# Patient Record
Sex: FEMALE | Race: WHITE | NOT HISPANIC OR LATINO | Employment: OTHER | URBAN - METROPOLITAN AREA
[De-identification: names, ages, dates, MRNs, and addresses within clinical notes are randomized per-mention and may not be internally consistent; named-entity substitution may affect disease eponyms.]

---

## 2018-03-18 ENCOUNTER — APPOINTMENT (EMERGENCY)
Dept: RADIOLOGY | Facility: HOSPITAL | Age: 73
DRG: 352 | End: 2018-03-18
Payer: MEDICARE

## 2018-03-18 ENCOUNTER — TELEPHONE (OUTPATIENT)
Dept: INTERNAL MEDICINE | Facility: CLINIC | Age: 73
End: 2018-03-18

## 2018-03-18 ENCOUNTER — ANESTHESIA EVENT (INPATIENT)
Dept: OPERATING ROOM | Facility: HOSPITAL | Age: 73
DRG: 352 | End: 2018-03-18
Payer: MEDICARE

## 2018-03-18 ENCOUNTER — HOSPITAL ENCOUNTER (INPATIENT)
Facility: HOSPITAL | Age: 73
LOS: 6 days | Discharge: HOME | DRG: 352 | End: 2018-03-24
Attending: EMERGENCY MEDICINE | Admitting: SURGERY
Payer: MEDICARE

## 2018-03-18 ENCOUNTER — ANESTHESIA (INPATIENT)
Dept: OPERATING ROOM | Facility: HOSPITAL | Age: 73
DRG: 352 | End: 2018-03-18
Payer: MEDICARE

## 2018-03-18 DIAGNOSIS — K40.30 INCARCERATED INGUINAL HERNIA: ICD-10-CM

## 2018-03-18 DIAGNOSIS — K56.609 SBO (SMALL BOWEL OBSTRUCTION) (CMS/HCC): ICD-10-CM

## 2018-03-18 DIAGNOSIS — K40.30: ICD-10-CM

## 2018-03-18 DIAGNOSIS — K41.30 INCARCERATED FEMORAL HERNIA: Primary | ICD-10-CM

## 2018-03-18 LAB
ABO + RH BLD: NORMAL
ANION GAP SERPL CALC-SCNC: 12 MEQ/L (ref 3–15)
APTT PPP: 24 SEC. (ref 23–35)
BACTERIA URNS QL MICRO: ABNORMAL /UL
BASOPHILS # BLD: 0.03 K/UL (ref 0.01–0.1)
BASOPHILS NFR BLD: 0.3 %
BILIRUB UR QL STRIP.AUTO: NEGATIVE MG/DL
BLD GP AB SCN SERPL QL: NEGATIVE
BUN SERPL-MCNC: 12 MG/DL (ref 8–20)
CALCIUM SERPL-MCNC: 9.5 MG/DL (ref 8.9–10.3)
CHLORIDE SERPL-SCNC: 96 MMOL/L (ref 98–109)
CLARITY UR REFRACT.AUTO: CLEAR
CO2 SERPL-SCNC: 25 MMOL/L (ref 22–32)
COLOR UR AUTO: YELLOW
CREAT SERPL-MCNC: 0.5 MG/DL (ref 0.6–1.1)
D AG BLD QL: NEGATIVE
EOSINOPHIL # BLD: 0.02 K/UL (ref 0.04–0.36)
EOSINOPHIL NFR BLD: 0.2 %
ERYTHROCYTE [DISTWIDTH] IN BLOOD BY AUTOMATED COUNT: 11.4 % (ref 11.7–14.4)
GFR SERPL CREATININE-BSD FRML MDRD: >60 ML/MIN/1.73M*2
GLUCOSE SERPL-MCNC: 140 MG/DL (ref 70–99)
GLUCOSE UR STRIP.AUTO-MCNC: NEGATIVE MG/DL
HCT VFR BLDCO AUTO: 37.9 % (ref 35–45)
HGB BLD-MCNC: 13.9 G/DL (ref 11.8–15.7)
HGB UR QL STRIP.AUTO: NEGATIVE
HYALINE CASTS #/AREA URNS LPF: ABNORMAL /LPF
IMM GRANULOCYTES # BLD AUTO: 0.03 K/UL (ref 0–0.08)
IMM GRANULOCYTES NFR BLD AUTO: 0.3 %
INR PPP: 1 INR
KETONES UR STRIP.AUTO-MCNC: ABNORMAL MG/DL
LEUKOCYTE ESTERASE UR QL STRIP.AUTO: NEGATIVE
LYMPHOCYTES # BLD: 0.83 K/UL (ref 1.2–3.5)
LYMPHOCYTES NFR BLD: 9.4 %
MCH RBC QN AUTO: 33.1 PG (ref 28–33.2)
MCHC RBC AUTO-ENTMCNC: 36.7 G/DL (ref 32.2–35.5)
MCV RBC AUTO: 90.2 FL (ref 83–98)
MONOCYTES # BLD: 0.23 K/UL (ref 0.28–0.8)
MONOCYTES NFR BLD: 2.6 %
NEUTROPHILS # BLD: 7.71 K/UL (ref 1.7–7)
NEUTS SEG NFR BLD: 87.2 %
NITRITE UR QL STRIP.AUTO: NEGATIVE
NRBC BLD-RTO: 0.1 %
PDW BLD AUTO: 10.4 FL (ref 9.4–12.3)
PH UR STRIP.AUTO: 7.5 [PH]
PLATELET # BLD AUTO: 205 K/UL (ref 150–369)
POTASSIUM SERPL-SCNC: 3.7 MMOL/L (ref 3.6–5.1)
PROT UR QL STRIP.AUTO: 1
PROTHROMBIN TIME: 13.3 SEC. (ref 12.2–14.5)
RBC # BLD AUTO: 4.2 M/UL (ref 3.93–5.22)
RBC #/AREA URNS HPF: ABNORMAL /HPF
SODIUM SERPL-SCNC: 133 MMOL/L (ref 136–144)
SP GR UR REFRACT.AUTO: 1.02
SQUAMOUS URNS QL MICRO: 1 /HPF
UROBILINOGEN UR STRIP-ACNC: 0.2 EU/DL
WBC # BLD AUTO: 8.85 K/UL (ref 3.8–10.5)
WBC #/AREA URNS HPF: ABNORMAL /HPF

## 2018-03-18 PROCEDURE — 96375 TX/PRO/DX INJ NEW DRUG ADDON: CPT | Mod: 59

## 2018-03-18 PROCEDURE — 71000011 HC PACU PHASE 1 EA ADDL MIN: Performed by: SURGERY

## 2018-03-18 PROCEDURE — 63600000 HC DRUGS/DETAIL CODE: Performed by: STUDENT IN AN ORGANIZED HEALTH CARE EDUCATION/TRAINING PROGRAM

## 2018-03-18 PROCEDURE — 36415 COLL VENOUS BLD VENIPUNCTURE: CPT | Performed by: PHYSICIAN ASSISTANT

## 2018-03-18 PROCEDURE — 88302 TISSUE EXAM BY PATHOLOGIST: CPT | Mod: TC | Performed by: SURGERY

## 2018-03-18 PROCEDURE — 74177 CT ABD & PELVIS W/CONTRAST: CPT

## 2018-03-18 PROCEDURE — 27200000 HC STERILE SUPPLY: Performed by: SURGERY

## 2018-03-18 PROCEDURE — 25800000 HC PHARMACY IV SOLUTIONS: Performed by: PHYSICIAN ASSISTANT

## 2018-03-18 PROCEDURE — 81001 URINALYSIS AUTO W/SCOPE: CPT | Performed by: PHYSICIAN ASSISTANT

## 2018-03-18 PROCEDURE — 86900 BLOOD TYPING SEROLOGIC ABO: CPT

## 2018-03-18 PROCEDURE — 85610 PROTHROMBIN TIME: CPT | Performed by: PHYSICIAN ASSISTANT

## 2018-03-18 PROCEDURE — 27200121 HC CATH FOLEY

## 2018-03-18 PROCEDURE — 99284 EMERGENCY DEPT VISIT MOD MDM: CPT | Mod: 25

## 2018-03-18 PROCEDURE — 99223 1ST HOSP IP/OBS HIGH 75: CPT | Mod: 57 | Performed by: SURGERY

## 2018-03-18 PROCEDURE — 37000001 HC ANESTHESIA GENERAL: Performed by: SURGERY

## 2018-03-18 PROCEDURE — 80048 BASIC METABOLIC PNL TOTAL CA: CPT | Performed by: PHYSICIAN ASSISTANT

## 2018-03-18 PROCEDURE — 36000003 HC OR LEVEL 3 INITIAL 30MIN: Performed by: SURGERY

## 2018-03-18 PROCEDURE — 36000013 HC OR LEVEL 3 EA ADDL MIN: Performed by: SURGERY

## 2018-03-18 PROCEDURE — 25800000 HC PHARMACY IV SOLUTIONS: Performed by: STUDENT IN AN ORGANIZED HEALTH CARE EDUCATION/TRAINING PROGRAM

## 2018-03-18 PROCEDURE — 63600000 HC DRUGS/DETAIL CODE: Performed by: NURSE ANESTHETIST, CERTIFIED REGISTERED

## 2018-03-18 PROCEDURE — 85730 THROMBOPLASTIN TIME PARTIAL: CPT | Performed by: PHYSICIAN ASSISTANT

## 2018-03-18 PROCEDURE — 0YQ70ZZ REPAIR RIGHT FEMORAL REGION, OPEN APPROACH: ICD-10-PCS | Performed by: SURGERY

## 2018-03-18 PROCEDURE — 12000000 HC ROOM AND CARE MED/SURG

## 2018-03-18 PROCEDURE — 96361 HYDRATE IV INFUSION ADD-ON: CPT | Mod: 59

## 2018-03-18 PROCEDURE — 25000000 HC PHARMACY GENERAL: Performed by: SURGERY

## 2018-03-18 PROCEDURE — 63600000 HC DRUGS/DETAIL CODE: Performed by: PHYSICIAN ASSISTANT

## 2018-03-18 PROCEDURE — 85025 COMPLETE CBC W/AUTO DIFF WBC: CPT | Performed by: PHYSICIAN ASSISTANT

## 2018-03-18 PROCEDURE — 25000000 HC PHARMACY GENERAL: Performed by: NURSE ANESTHETIST, CERTIFIED REGISTERED

## 2018-03-18 PROCEDURE — 49553 RPR FEM HERNIA INIT BLOCKED: CPT | Mod: RT | Performed by: SURGERY

## 2018-03-18 PROCEDURE — 25800000 HC PHARMACY IV SOLUTIONS: Performed by: NURSE ANESTHETIST, CERTIFIED REGISTERED

## 2018-03-18 PROCEDURE — 71000001 HC PACU PHASE 1 INITIAL 30MIN: Performed by: SURGERY

## 2018-03-18 PROCEDURE — 63600000 HC DRUGS/DETAIL CODE: Mod: JW | Performed by: NURSE ANESTHETIST, CERTIFIED REGISTERED

## 2018-03-18 PROCEDURE — 63600105 HC IODINE BASED CONTRAST: Performed by: PHYSICIAN ASSISTANT

## 2018-03-18 PROCEDURE — 96374 THER/PROPH/DIAG INJ IV PUSH: CPT | Mod: 59

## 2018-03-18 RX ORDER — ESOMEPRAZOLE MAGNESIUM 40 MG/1
40 CAPSULE, DELAYED RELEASE ORAL DAILY
COMMUNITY
Start: 2014-10-30 | End: 2020-01-06 | Stop reason: SDUPTHER

## 2018-03-18 RX ORDER — HEPARIN SODIUM 5000 [USP'U]/ML
5000 INJECTION, SOLUTION INTRAVENOUS; SUBCUTANEOUS EVERY 8 HOURS
Status: DISCONTINUED | OUTPATIENT
Start: 2018-03-19 | End: 2018-03-24 | Stop reason: HOSPADM

## 2018-03-18 RX ORDER — PROPOFOL 10 MG/ML
INJECTION, EMULSION INTRAVENOUS AS NEEDED
Status: DISCONTINUED | OUTPATIENT
Start: 2018-03-18 | End: 2018-03-18 | Stop reason: SURG

## 2018-03-18 RX ORDER — HYDROMORPHONE HYDROCHLORIDE 1 MG/ML
0.5 INJECTION, SOLUTION INTRAMUSCULAR; INTRAVENOUS; SUBCUTANEOUS ONCE
Status: COMPLETED | OUTPATIENT
Start: 2018-03-18 | End: 2018-03-18

## 2018-03-18 RX ORDER — DEXTROSE MONOHYDRATE AND SODIUM CHLORIDE 5; .9 G/100ML; G/100ML
INJECTION, SOLUTION INTRAVENOUS CONTINUOUS
Status: DISCONTINUED | OUTPATIENT
Start: 2018-03-18 | End: 2018-03-19

## 2018-03-18 RX ORDER — LIDOCAINE HYDROCHLORIDE 10 MG/ML
INJECTION, SOLUTION INFILTRATION; PERINEURAL AS NEEDED
Status: DISCONTINUED | OUTPATIENT
Start: 2018-03-18 | End: 2018-03-18 | Stop reason: SURG

## 2018-03-18 RX ORDER — DEXTROSE 50 % IN WATER (D50W) INTRAVENOUS SYRINGE
25 AS NEEDED
Status: DISCONTINUED | OUTPATIENT
Start: 2018-03-18 | End: 2018-03-24 | Stop reason: HOSPADM

## 2018-03-18 RX ORDER — DEXTROSE 40 %
15-30 GEL (GRAM) ORAL AS NEEDED
Status: DISCONTINUED | OUTPATIENT
Start: 2018-03-18 | End: 2018-03-24 | Stop reason: HOSPADM

## 2018-03-18 RX ORDER — PHENYLEPHRINE HYDROCHLORIDE 10 MG/ML
INJECTION INTRAVENOUS AS NEEDED
Status: DISCONTINUED | OUTPATIENT
Start: 2018-03-18 | End: 2018-03-18 | Stop reason: SURG

## 2018-03-18 RX ORDER — SODIUM CHLORIDE 9 MG/ML
INJECTION, SOLUTION INTRAVENOUS CONTINUOUS PRN
Status: DISCONTINUED | OUTPATIENT
Start: 2018-03-18 | End: 2018-03-18 | Stop reason: SURG

## 2018-03-18 RX ORDER — HYDROMORPHONE HYDROCHLORIDE 2 MG/1
2-4 TABLET ORAL EVERY 4 HOURS PRN
Status: DISCONTINUED | OUTPATIENT
Start: 2018-03-18 | End: 2018-03-24 | Stop reason: HOSPADM

## 2018-03-18 RX ORDER — ROCURONIUM BROMIDE 10 MG/ML
INJECTION, SOLUTION INTRAVENOUS AS NEEDED
Status: DISCONTINUED | OUTPATIENT
Start: 2018-03-18 | End: 2018-03-18 | Stop reason: SURG

## 2018-03-18 RX ORDER — HYDROMORPHONE HYDROCHLORIDE 1 MG/ML
.25-.5 INJECTION, SOLUTION INTRAMUSCULAR; INTRAVENOUS; SUBCUTANEOUS
Status: DISCONTINUED | OUTPATIENT
Start: 2018-03-18 | End: 2018-03-24 | Stop reason: HOSPADM

## 2018-03-18 RX ORDER — ATORVASTATIN CALCIUM 20 MG/1
20 TABLET, FILM COATED ORAL DAILY
COMMUNITY
Start: 2018-01-30 | End: 2019-02-23 | Stop reason: SDUPTHER

## 2018-03-18 RX ORDER — LIDOCAINE HYDROCHLORIDE 10 MG/ML
INJECTION, SOLUTION INFILTRATION; PERINEURAL AS NEEDED
Status: DISCONTINUED | OUTPATIENT
Start: 2018-03-18 | End: 2018-03-18 | Stop reason: HOSPADM

## 2018-03-18 RX ORDER — FENTANYL CITRATE 50 UG/ML
INJECTION, SOLUTION INTRAMUSCULAR; INTRAVENOUS AS NEEDED
Status: DISCONTINUED | OUTPATIENT
Start: 2018-03-18 | End: 2018-03-18 | Stop reason: SURG

## 2018-03-18 RX ORDER — CIPROFLOXACIN 500 MG/1
500 TABLET ORAL EVERY 12 HOURS
Status: DISCONTINUED | OUTPATIENT
Start: 2018-03-19 | End: 2018-03-18

## 2018-03-18 RX ORDER — ONDANSETRON HYDROCHLORIDE 2 MG/ML
INJECTION, SOLUTION INTRAVENOUS AS NEEDED
Status: DISCONTINUED | OUTPATIENT
Start: 2018-03-18 | End: 2018-03-18 | Stop reason: SURG

## 2018-03-18 RX ORDER — ONDANSETRON HYDROCHLORIDE 2 MG/ML
4 INJECTION, SOLUTION INTRAVENOUS ONCE
Status: COMPLETED | OUTPATIENT
Start: 2018-03-18 | End: 2018-03-18

## 2018-03-18 RX ORDER — GLYCOPYRROLATE 0.6MG/3ML
SYRINGE (ML) INTRAVENOUS AS NEEDED
Status: DISCONTINUED | OUTPATIENT
Start: 2018-03-18 | End: 2018-03-18 | Stop reason: SURG

## 2018-03-18 RX ORDER — DEXAMETHASONE SODIUM PHOSPHATE 4 MG/ML
INJECTION, SOLUTION INTRA-ARTICULAR; INTRALESIONAL; INTRAMUSCULAR; INTRAVENOUS; SOFT TISSUE AS NEEDED
Status: DISCONTINUED | OUTPATIENT
Start: 2018-03-18 | End: 2018-03-18 | Stop reason: SURG

## 2018-03-18 RX ORDER — ONDANSETRON HYDROCHLORIDE 2 MG/ML
INJECTION, SOLUTION INTRAVENOUS
Status: COMPLETED
Start: 2018-03-18 | End: 2018-03-18

## 2018-03-18 RX ORDER — BUPIVACAINE HYDROCHLORIDE AND EPINEPHRINE 2.5; 5 MG/ML; UG/ML
INJECTION, SOLUTION EPIDURAL; INFILTRATION; INTRACAUDAL; PERINEURAL AS NEEDED
Status: DISCONTINUED | OUTPATIENT
Start: 2018-03-18 | End: 2018-03-18 | Stop reason: HOSPADM

## 2018-03-18 RX ORDER — NEOSTIGMINE METHYLSULFATE 1 MG/ML
INJECTION INTRAVENOUS AS NEEDED
Status: DISCONTINUED | OUTPATIENT
Start: 2018-03-18 | End: 2018-03-18 | Stop reason: SURG

## 2018-03-18 RX ORDER — IBUPROFEN 200 MG
16-32 TABLET ORAL AS NEEDED
Status: DISCONTINUED | OUTPATIENT
Start: 2018-03-18 | End: 2018-03-24 | Stop reason: HOSPADM

## 2018-03-18 RX ORDER — KETOROLAC TROMETHAMINE 30 MG/ML
INJECTION, SOLUTION INTRAMUSCULAR; INTRAVENOUS AS NEEDED
Status: DISCONTINUED | OUTPATIENT
Start: 2018-03-18 | End: 2018-03-18 | Stop reason: SURG

## 2018-03-18 RX ADMIN — SODIUM CHLORIDE 2 MG: 9 INJECTION, SOLUTION INTRAVENOUS at 22:48

## 2018-03-18 RX ADMIN — FENTANYL CITRATE 50 MCG: 50 INJECTION, SOLUTION INTRAMUSCULAR; INTRAVENOUS at 23:21

## 2018-03-18 RX ADMIN — DEXTROSE AND SODIUM CHLORIDE: 5; 900 INJECTION, SOLUTION INTRAVENOUS at 20:57

## 2018-03-18 RX ADMIN — IOHEXOL 135 ML: 300 INJECTION, SOLUTION INTRAVENOUS at 19:23

## 2018-03-18 RX ADMIN — FENTANYL CITRATE 50 MCG: 50 INJECTION, SOLUTION INTRAMUSCULAR; INTRAVENOUS at 22:48

## 2018-03-18 RX ADMIN — Medication 5 MG: at 23:41

## 2018-03-18 RX ADMIN — HYDROMORPHONE HYDROCHLORIDE 0.5 MG: 1 INJECTION, SOLUTION INTRAMUSCULAR; INTRAVENOUS; SUBCUTANEOUS at 17:58

## 2018-03-18 RX ADMIN — SODIUM CHLORIDE 1000 ML: 9 INJECTION, SOLUTION INTRAVENOUS at 17:51

## 2018-03-18 RX ADMIN — LIDOCAINE HYDROCHLORIDE 4 ML: 10 INJECTION, SOLUTION INFILTRATION; PERINEURAL at 22:48

## 2018-03-18 RX ADMIN — Medication 0.4 MG: at 22:48

## 2018-03-18 RX ADMIN — KETOROLAC TROMETHAMINE 30 MG: 30 INJECTION, SOLUTION INTRAMUSCULAR at 23:31

## 2018-03-18 RX ADMIN — PHENYLEPHRINE HYDROCHLORIDE 100 MCG: 10 INJECTION INTRAVENOUS at 23:34

## 2018-03-18 RX ADMIN — ONDANSETRON 4 MG: 2 INJECTION INTRAMUSCULAR; INTRAVENOUS at 22:40

## 2018-03-18 RX ADMIN — ROCURONIUM BROMIDE 40 MG: 10 INJECTION, SOLUTION INTRAVENOUS at 22:48

## 2018-03-18 RX ADMIN — PROPOFOL 140 MG: 10 INJECTION, EMULSION INTRAVENOUS at 22:48

## 2018-03-18 RX ADMIN — FENTANYL CITRATE 50 MCG: 50 INJECTION, SOLUTION INTRAMUSCULAR; INTRAVENOUS at 23:10

## 2018-03-18 RX ADMIN — DEXAMETHASONE SODIUM PHOSPHATE 4 MG: 4 INJECTION, SOLUTION INTRAMUSCULAR; INTRAVENOUS at 23:30

## 2018-03-18 RX ADMIN — SODIUM CHLORIDE: 900 INJECTION, SOLUTION INTRAVENOUS at 22:47

## 2018-03-18 RX ADMIN — ONDANSETRON HYDROCHLORIDE 4 MG: 2 INJECTION, SOLUTION INTRAMUSCULAR; INTRAVENOUS at 17:58

## 2018-03-18 RX ADMIN — Medication 0.8 MG: at 23:41

## 2018-03-18 ASSESSMENT — ENCOUNTER SYMPTOMS
DIARRHEA: 0
NECK PAIN: 0
DIFFICULTY URINATING: 0
NAUSEA: 1
WEAKNESS: 0
ABDOMINAL PAIN: 1
HEADACHES: 0
FEVER: 0
SHORTNESS OF BREATH: 0
BACK PAIN: 0

## 2018-03-18 NOTE — TELEPHONE ENCOUNTER
Spoke with patient  She had sudden onset of abd pain today mostly lower abd and right sided,assosciated with vomiting x2  She also had constipation.some chills and no fever  I advised her to be evaluated in ER  And she agreed to go to [facility]

## 2018-03-18 NOTE — H&P
General Surgery History and Physical    Diagnosis: No admission diagnoses are documented for this encounter..    HPI     Patient is a 72 y.o. female who presents with 1 day h/o aching RLQ pain after mooving furniture this morning. She did not feel a pop but gradually developed abdominal pain and felt a new lump in her right inguinal region. + nausea and emesis x2. Dis have 2 small BM and flatus after she felt the lump but does feel slightly distended. No fevers, never had this before, no hernia history.     Medical History:   Past Medical History:   Diagnosis Date   • Marshall esophagus    • High cholesterol    • Hypothyroidism        Surgical History:   Past Surgical History:   Procedure Laterality Date   • CHOLECYSTECTOMY     • HYSTERECTOMY         Social History:   Social History     Social History Narrative   • No narrative on file       Family History: History reviewed. No pertinent family history.    Allergies: Patient has no known allergies.    Home Medications:  Not in a hospital admission.    Current Medications:  •  atorvastatin  •  esomeprazole  •  LEVOTHYROXINE SODIUM (SYNTHROID ORAL)    Review of Systems  All other systems reviewed and negative except as noted in the HPI.    Objective     Vital Signs for the last 24 hours:  Temp:  [36.3 °C (97.3 °F)] 36.3 °C (97.3 °F)  Heart Rate:  [51-57] 57  Resp:  [13-16] 13  BP: (119-124)/(50-51) 124/51    Physicial Exam    General appearance: alert, appears stated age and cooperative  Lungs: clear to auscultation bilaterally  Chest wall: no tenderness  Heart: regular rate and rhythm, S1, S2 normal, no murmur, click, rub or gallop  Abdomen: soft, Nt, Nd, 3 x 2 cm firm lump in Right inguinal region, no skin changes, unable to reduce at bedside    Assessment/Plan   72 F with new right inguinal mass c/f incarcerated ehrnia  - CT a/p  - pain control  - will attempt manual reduction at bedside after pain meds, trendelenberg  - f/u labs    Code Status: No Order    I saw  and evaluated the patient.  I discussed the case with the Resident and agree with the findings and plan as documented in the note except for my comments below or within the additional notes today.     The patient has an incarcerated hernia.  Could be femoral or inguinal.  Needs repair in the OR tonight.  She understands the risks and benefits of this procedure.    Please page with any questions or concerns.  Pager [phone]     Yang Green MD

## 2018-03-18 NOTE — ED PROVIDER NOTES
"HPI     Chief Complaint   Patient presents with   • Abdominal Pain     Pt states she started today after 12:00 with abd pain and vomiting. Pt states 2 soft stools since and now c/o \"hard area\" she feels on abd.        Pt with a cc of generalized abdominal pain and vomiting x 1 day. Pt states she was moving furniture and felt in in her abdomen and had several episodes of vomitng and pain. Pt called PCP who was concerned for SBO, but pt then had two bowel movements. She then noticed a large firm mass in her R groin that was not there the day prior. Pt called brother who is  Who said she might have a hernia. Pt admits to vague abdominal discomfort and nausea. Pt dneies fever, chills, urinary symptoms.              Patient History     Past Medical History:   Diagnosis Date   • Marshall esophagus    • High cholesterol    • Hypothyroidism        Past Surgical History:   Procedure Laterality Date   • CHOLECYSTECTOMY     • FEMORAL HERNIA REPAIR Right     bowel dusky but OK - no resection   • HYSTERECTOMY         History reviewed. No pertinent family history.    Social History   Substance Use Topics   • Smoking status: Never Smoker   • Smokeless tobacco: Never Used   • Alcohol use No       Systems Reviewed from Nursing Triage:  Tobacco  Allergies  Meds  Problems  Med Hx  Surg Hx  Soc Hx         Review of Systems     Review of Systems   Constitutional: Negative for fever.   Respiratory: Negative for shortness of breath.    Cardiovascular: Negative for chest pain and leg swelling.   Gastrointestinal: Positive for abdominal pain and nausea. Negative for diarrhea.   Genitourinary: Negative for difficulty urinating.   Musculoskeletal: Negative for back pain and neck pain.   Skin: Negative for rash.   Neurological: Negative for weakness and headaches.        Physical Exam     ED Triage Vitals   Temp Heart Rate Resp BP SpO2   03/18/18 1709 03/18/18 1709 03/18/18 1709 03/18/18 1709 03/18/18 1709   36.3 °C (97.3 °F) (!) 51 16 " "(!) 119/50 100 %      Temp Source Heart Rate Source Patient Position BP Location FiO2 (%) (Set)   03/18/18 1709 03/18/18 1709 03/18/18 1842 03/18/18 1842 --   Tympanic Monitor Lying Left upper arm                      Patient Vitals for the past 24 hrs:   BP Temp Temp src Pulse Resp SpO2 Height Weight   03/19/18 0030 - - - (!) 51 16 100 % - -   03/19/18 0020 (!) 149/65 - - (!) 52 18 100 % - -   03/19/18 0010 (!) 141/63 - - (!) 54 (!) 10 100 % - -   03/19/18 0000 (!) 149/65 37 °C (98.6 °F) - 78 - 100 % - -   03/18/18 2053 (!) 134/59 37.5 °C (99.5 °F) Tympanic (!) 50 18 100 % - -   03/18/18 1842 (!) 124/51 - - (!) 57 13 100 % - -   03/18/18 1709 (!) 119/50 36.3 °C (97.3 °F) Tympanic (!) 51 16 100 % 1.651 m (5' 5\") 60.3 kg (133 lb)           Physical Exam   Constitutional: She appears well-developed and well-nourished. No distress.   HENT:   Head: Normocephalic and atraumatic.   Eyes: Conjunctivae are normal.   Neck: Neck supple.   Cardiovascular: Normal rate and regular rhythm.    No murmur heard.  Pulmonary/Chest: Effort normal and breath sounds normal. No respiratory distress.   Abdominal: Soft. She exhibits mass. There is tenderness in the right lower quadrant and suprapubic area. A hernia is present. Hernia confirmed positive in the right inguinal area.       Musculoskeletal: She exhibits no edema.   Neurological: She is alert.   Skin: Skin is warm and dry.   Psychiatric: She has a normal mood and affect.   Nursing note and vitals reviewed.           Procedures    ED Course & MDM     Labs Reviewed   BASIC METABOLIC PANEL - Abnormal        Result Value    Sodium 133 (*)     Chloride 96 (*)     Creatinine 0.5 (*)     Glucose 140 (*)     Potassium 3.7      CO2 25      BUN 12      Calcium 9.5      eGFR >60.0      Anion Gap 12     UA REFLEX CULTURE (MACROSCOPIC) - Abnormal     Protein, Urine +1 (*)     Ketones, Urine Trace (*)     Color, Urine Yellow      Clarity, Urine Clear      Specific Gravity, Urine 1.016      pH, " Urine 7.5      Leukocyte Esterase Negative      Nitrite, Urine Negative      Glucose, Urine Negative      Urobilinogen, Urine 0.2      Bilirubin, Urine Negative      Blood, Urine Negative     CBC - Abnormal     MCHC 36.7 (*)     RDW 11.4 (*)     WBC 8.85      RBC 4.20      Hemoglobin 13.9      Hematocrit 37.9      MCV 90.2      MCH 33.1      Platelets 205      MPV 10.4     DIFF COUNT - Abnormal     nRBC 0.1 (*)     Neutrophils, Absolute 7.71 (*)     Lymphocytes, Absolute 0.83 (*)     Monocytes, Absolute 0.23 (*)     Eosinophils, Absolute 0.02 (*)     Immature Granulocytes 0.3      Neutrophils 87.2      Lymphocytes 9.4      Monocytes 2.6      Eosinophils 0.2      Basophils 0.3      Immature Granulocytes, Absolute 0.03      Basophils, Absolute 0.03     UA MICROSCOPIC - Abnormal     Squamous Epithelial +1 (*)     RBC, Urine 0 TO 4      WBC, Urine 0 TO 3      Hyaline Cast None Seen      Bacteria, Urine None Seen     PROTIME-INR - Normal    PT 13.3      INR 1.0     PTT - Normal    PTT 24     CBC AND DIFFERENTIAL    Narrative:     The following orders were created for panel order CBC and differential.  Procedure                               Abnormality         Status                     ---------                               -----------         ------                     CBC[[id number]]                           Abnormal            Final result               Diff Count[[id number]]                    Abnormal            Final result                 Please view results for these tests on the individual orders.   URINALYSIS REFLEX CULTURE    Narrative:     The following orders were created for panel order Urinalysis with Reflex Culture.  Procedure                               Abnormality         Status                     ---------                               -----------         ------                     UA Reflex to Culture (Mac...[[id number]]  Abnormal            Final result               UA Microscopic[[id number]]                 Abnormal            Final result                 Please view results for these tests on the individual orders.   TYPE AND SCREEN    Antibody Screen Negative      ABO O      Rh Factor Negative     PATHOLOGY TISSUE EXAM       CT ABDOMEN PELVIS WITH IV CONTRAST   Final Result   IMPRESSION:   *  Findings most consistent with partial or early small bowel obstruction with a   transition point seen within a right inguinal hernia which contains loops of   small bowel.   *  The stomach is dilated with prominent and thickened gastric folds, correlate   for gastritis.   *  Colonic diverticulosis.   *  Normal appendix.   *  Nonspecific mild intrahepatic and extrahepatic biliary ductal dilatation,   which could be within normal limits in a postcholecystectomy state.  Follow-up   may be considered if clinically warranted.   *   I certify that I have reviewed this examination and agree with this report.   Mary Tinoco MD              [facility]         ED Course as of Mar 19 0037   Sun Mar 18, 2018   1818 I used gentle pressure and attempt to relocate the hernia but it really did not significantly diminished in size.  Placed the patient in Trendelenburg and I am consulting surgery.  [SB]   2006 CAT scan is done on paging surgery to have them reviewed with radiology.  [SB]   2012 Surgery is on the way down to evaluate the patient.  [SB]   2018 I reviewed the CAT scan results with the surgical resident he believes she has a bowel obstruction along with an incarcerated inguinal hernia.  And his plan is to take her to the OR tonight if she is amenable.  [SB]   2102 The plan is for the patient to go emergently to the operating room tonight.  [SB]      ED Course User Index  [SB] Jesus Martinez MD         Clinical Impressions as of Mar 19 0037   Incarcerated inguinal hernia   SBO (small bowel obstruction)     Disposition:  Admit / Observation     GERSON Loomis  03/19/18 0037

## 2018-03-18 NOTE — ED ATTESTATION NOTE
I have personally seen and examined the patient.  I reviewed and agree with physician assistant / nurse practitioner’s assessment and plan of care, with the following exceptions: None  My examination, assessment, and plan of care of Katheryn Goncalves is as follows:     Exam: The patient is awake alert she appears to be in no apparent distress her abdomen is soft and nontender with the exception of a mass in the right lower quadrant which appears to be tender and is consistent with a hernia.  No surrounding erythema.  Her bowel sounds are normal.      Awaiting labs and the plan is to CAT scan the patient's abdomen and in the meantime I will consult surgery.     Jesus Martinez MD  03/18/18 6176

## 2018-03-19 PROBLEM — K41.30 INCARCERATED FEMORAL HERNIA: Status: ACTIVE | Noted: 2018-03-18

## 2018-03-19 LAB
ANION GAP SERPL CALC-SCNC: 6 MEQ/L (ref 3–15)
BUN SERPL-MCNC: 9 MG/DL (ref 8–20)
CALCIUM SERPL-MCNC: 8.9 MG/DL (ref 8.9–10.3)
CHLORIDE SERPL-SCNC: 108 MMOL/L (ref 98–109)
CO2 SERPL-SCNC: 24 MMOL/L (ref 22–32)
CREAT SERPL-MCNC: 0.6 MG/DL (ref 0.6–1.1)
ERYTHROCYTE [DISTWIDTH] IN BLOOD BY AUTOMATED COUNT: 11.7 % (ref 11.7–14.4)
GFR SERPL CREATININE-BSD FRML MDRD: >60 ML/MIN/1.73M*2
GLUCOSE SERPL-MCNC: 146 MG/DL (ref 70–99)
HCT VFR BLDCO AUTO: 37.3 % (ref 35–45)
HGB BLD-MCNC: 13.5 G/DL (ref 11.8–15.7)
MCH RBC QN AUTO: 33.2 PG (ref 28–33.2)
MCHC RBC AUTO-ENTMCNC: 36.2 G/DL (ref 32.2–35.5)
MCV RBC AUTO: 91.6 FL (ref 83–98)
PDW BLD AUTO: 9.8 FL (ref 9.4–12.3)
PLATELET # BLD AUTO: 194 K/UL (ref 150–369)
POTASSIUM SERPL-SCNC: 3.9 MMOL/L (ref 3.6–5.1)
RBC # BLD AUTO: 4.07 M/UL (ref 3.93–5.22)
SODIUM SERPL-SCNC: 138 MMOL/L (ref 136–144)
WBC # BLD AUTO: 10.75 K/UL (ref 3.8–10.5)

## 2018-03-19 PROCEDURE — 63700000 HC SELF-ADMINISTRABLE DRUG: Performed by: STUDENT IN AN ORGANIZED HEALTH CARE EDUCATION/TRAINING PROGRAM

## 2018-03-19 PROCEDURE — 85027 COMPLETE CBC AUTOMATED: CPT | Performed by: STUDENT IN AN ORGANIZED HEALTH CARE EDUCATION/TRAINING PROGRAM

## 2018-03-19 PROCEDURE — 99024 POSTOP FOLLOW-UP VISIT: CPT | Performed by: SURGERY

## 2018-03-19 PROCEDURE — 63600000 HC DRUGS/DETAIL CODE: Performed by: STUDENT IN AN ORGANIZED HEALTH CARE EDUCATION/TRAINING PROGRAM

## 2018-03-19 PROCEDURE — 36415 COLL VENOUS BLD VENIPUNCTURE: CPT | Performed by: STUDENT IN AN ORGANIZED HEALTH CARE EDUCATION/TRAINING PROGRAM

## 2018-03-19 PROCEDURE — 25800000 HC PHARMACY IV SOLUTIONS: Performed by: STUDENT IN AN ORGANIZED HEALTH CARE EDUCATION/TRAINING PROGRAM

## 2018-03-19 PROCEDURE — 12000000 HC ROOM AND CARE MED/SURG

## 2018-03-19 PROCEDURE — 80048 BASIC METABOLIC PNL TOTAL CA: CPT | Performed by: STUDENT IN AN ORGANIZED HEALTH CARE EDUCATION/TRAINING PROGRAM

## 2018-03-19 RX ORDER — DEXTROSE 40 %
15-30 GEL (GRAM) ORAL AS NEEDED
Status: DISCONTINUED | OUTPATIENT
Start: 2018-03-19 | End: 2018-03-19 | Stop reason: HOSPADM

## 2018-03-19 RX ORDER — FENTANYL CITRATE 50 UG/ML
50 INJECTION, SOLUTION INTRAMUSCULAR; INTRAVENOUS
Status: DISCONTINUED | OUTPATIENT
Start: 2018-03-19 | End: 2018-03-19 | Stop reason: HOSPADM

## 2018-03-19 RX ORDER — IBUPROFEN 200 MG
16-32 TABLET ORAL AS NEEDED
Status: DISCONTINUED | OUTPATIENT
Start: 2018-03-19 | End: 2018-03-19 | Stop reason: HOSPADM

## 2018-03-19 RX ORDER — ATORVASTATIN CALCIUM 20 MG/1
20 TABLET, FILM COATED ORAL
Status: DISCONTINUED | OUTPATIENT
Start: 2018-03-19 | End: 2018-03-24 | Stop reason: HOSPADM

## 2018-03-19 RX ORDER — METOCLOPRAMIDE HYDROCHLORIDE 5 MG/ML
10 INJECTION INTRAMUSCULAR; INTRAVENOUS
Status: DISCONTINUED | OUTPATIENT
Start: 2018-03-19 | End: 2018-03-19 | Stop reason: HOSPADM

## 2018-03-19 RX ORDER — MEPERIDINE HYDROCHLORIDE 25 MG/ML
12.5 INJECTION INTRAMUSCULAR; INTRAVENOUS; SUBCUTANEOUS EVERY 10 MIN PRN
Status: DISCONTINUED | OUTPATIENT
Start: 2018-03-19 | End: 2018-03-19 | Stop reason: HOSPADM

## 2018-03-19 RX ORDER — LEVOTHYROXINE SODIUM 100 UG/1
100 TABLET ORAL
Status: DISCONTINUED | OUTPATIENT
Start: 2018-03-19 | End: 2018-03-24 | Stop reason: HOSPADM

## 2018-03-19 RX ORDER — ONDANSETRON HYDROCHLORIDE 2 MG/ML
4 INJECTION, SOLUTION INTRAVENOUS
Status: DISCONTINUED | OUTPATIENT
Start: 2018-03-19 | End: 2018-03-19 | Stop reason: HOSPADM

## 2018-03-19 RX ORDER — ACETAMINOPHEN 325 MG/1
650 TABLET ORAL EVERY 6 HOURS PRN
Status: DISCONTINUED | OUTPATIENT
Start: 2018-03-19 | End: 2018-03-24 | Stop reason: HOSPADM

## 2018-03-19 RX ORDER — DIPHENHYDRAMINE HYDROCHLORIDE 50 MG/ML
12.5 INJECTION INTRAMUSCULAR; INTRAVENOUS
Status: DISCONTINUED | OUTPATIENT
Start: 2018-03-19 | End: 2018-03-19 | Stop reason: HOSPADM

## 2018-03-19 RX ORDER — MIDAZOLAM HYDROCHLORIDE 2 MG/2ML
1 INJECTION, SOLUTION INTRAMUSCULAR; INTRAVENOUS AS NEEDED
Status: DISCONTINUED | OUTPATIENT
Start: 2018-03-19 | End: 2018-03-19 | Stop reason: HOSPADM

## 2018-03-19 RX ORDER — EPHEDRINE SULFATE/0.9% NACL/PF 50 MG/5 ML
50 SYRINGE (ML) INTRAVENOUS AS NEEDED
Status: DISCONTINUED | OUTPATIENT
Start: 2018-03-19 | End: 2018-03-19 | Stop reason: HOSPADM

## 2018-03-19 RX ORDER — DEXTROSE 50 % IN WATER (D50W) INTRAVENOUS SYRINGE
25 AS NEEDED
Status: DISCONTINUED | OUTPATIENT
Start: 2018-03-19 | End: 2018-03-19 | Stop reason: HOSPADM

## 2018-03-19 RX ORDER — DEXTROSE MONOHYDRATE AND SODIUM CHLORIDE 5; .45 G/100ML; G/100ML
INJECTION, SOLUTION INTRAVENOUS CONTINUOUS
Status: DISCONTINUED | OUTPATIENT
Start: 2018-03-19 | End: 2018-03-23

## 2018-03-19 RX ORDER — PANTOPRAZOLE SODIUM 40 MG/1
40 TABLET, DELAYED RELEASE ORAL
Status: DISCONTINUED | OUTPATIENT
Start: 2018-03-19 | End: 2018-03-22

## 2018-03-19 RX ORDER — ONDANSETRON HYDROCHLORIDE 2 MG/ML
4 INJECTION, SOLUTION INTRAVENOUS EVERY 8 HOURS PRN
Status: DISCONTINUED | OUTPATIENT
Start: 2018-03-19 | End: 2018-03-24 | Stop reason: HOSPADM

## 2018-03-19 RX ORDER — HYDROMORPHONE HYDROCHLORIDE 2 MG/ML
0.5 INJECTION, SOLUTION INTRAMUSCULAR; INTRAVENOUS; SUBCUTANEOUS
Status: DISCONTINUED | OUTPATIENT
Start: 2018-03-19 | End: 2018-03-19 | Stop reason: HOSPADM

## 2018-03-19 RX ADMIN — ATORVASTATIN CALCIUM 20 MG: 20 TABLET, FILM COATED ORAL at 18:33

## 2018-03-19 RX ADMIN — ONDANSETRON 4 MG: 2 INJECTION INTRAMUSCULAR; INTRAVENOUS at 23:44

## 2018-03-19 RX ADMIN — HEPARIN SODIUM 5000 UNITS: 5000 INJECTION, SOLUTION INTRAVENOUS; SUBCUTANEOUS at 14:09

## 2018-03-19 RX ADMIN — DEXTROSE AND SODIUM CHLORIDE: 5; 900 INJECTION, SOLUTION INTRAVENOUS at 02:07

## 2018-03-19 RX ADMIN — ACETAMINOPHEN 650 MG: 325 TABLET, FILM COATED ORAL at 19:53

## 2018-03-19 RX ADMIN — ACETAMINOPHEN 650 MG: 325 TABLET, FILM COATED ORAL at 12:52

## 2018-03-19 RX ADMIN — DEXTROSE AND SODIUM CHLORIDE: 5; 450 INJECTION, SOLUTION INTRAVENOUS at 21:26

## 2018-03-19 RX ADMIN — PANTOPRAZOLE SODIUM 40 MG: 40 TABLET, DELAYED RELEASE ORAL at 09:45

## 2018-03-19 RX ADMIN — HEPARIN SODIUM 5000 UNITS: 5000 INJECTION, SOLUTION INTRAVENOUS; SUBCUTANEOUS at 21:11

## 2018-03-19 RX ADMIN — LEVOTHYROXINE SODIUM 100 MCG: 100 TABLET ORAL at 10:52

## 2018-03-19 RX ADMIN — HEPARIN SODIUM 5000 UNITS: 5000 INJECTION, SOLUTION INTRAVENOUS; SUBCUTANEOUS at 05:57

## 2018-03-19 RX ADMIN — DEXTROSE AND SODIUM CHLORIDE: 5; 450 INJECTION, SOLUTION INTRAVENOUS at 09:46

## 2018-03-19 ASSESSMENT — COGNITIVE AND FUNCTIONAL STATUS - GENERAL
EATING MEALS: 4 - NONE
MOVING TO AND FROM BED TO CHAIR: 4 - NONE
HELP NEEDED FOR PERSONAL GROOMING: 4 - NONE
HELP NEEDED FOR BATHING: 4 - NONE
WALKING IN HOSPITAL ROOM: 4 - NONE
DRESSING REGULAR UPPER BODY CLOTHING: 4 - NONE
CLIMB 3 TO 5 STEPS WITH RAILING: 4 - NONE
STANDING UP FROM CHAIR USING ARMS: 4 - NONE
TOILETING: 4 - NONE
DRESSING REGULAR LOWER BODY CLOTHING: 4 - NONE

## 2018-03-19 ASSESSMENT — PAIN SCALES - GENERAL: PAIN_LEVEL: 3

## 2018-03-19 NOTE — OP NOTE
Date of procedure -3/19/18    Preoperative diagnosis -incarcerated right inguinal hernia    Postop diagnosis -incarcerated right femoral hernia    Procedure -repair of incarcerated right femoral hernia none    Surgeon -  Norma Chavez.-PGY 2    Anesthesia -general endotracheal    Specimen -hernia sac    Drains -none    Indications for procedure -this is a 72-year-old white female who presented with 6 hours of right groin pain after moving furniture in her house.  A CAT scan showed an incarcerated hernia in the inguinal region.  She had a bowel obstruction and my resident could not reduce this in the emergency room.  She obviously needed an urgent trip to the operating room.    Description of procedure -the patient was correctly identified and taken to the operating room where she was placed on the operating table and after the adequate administration of general endotracheal anesthesia a Gaspar catheter was placed and the abdomen was prepped and draped in the usual sterile fashion and an NG tube was placed.    At this point a 4 cm incision was made right over the hernia sac which I now thought was below the right inguinal ligament.  We came through the fat and subcutaneous tissues carefully with the Bovie and hemostat.  We then reached the hernia sac.  The hernia sac was incised and some fluid was evacuated from the hernia sac.  The fluid was somewhat dark.  We then opened up the hernia sac widely and exposed the bowel.  The bowel appeared very dusky and dark.  I then slipped my finger up into the femoral canal and I incised the roof of the femoral canal which is the inguinal ligament.  This released the tension on the femoral canal.  Incidentally I needed to do this to even begin to work on the bowel at all because the bowel was not mobile without this type of space in the femoral canal.    Now that we had more room to work I placed a retractor in the wound to expose things better and I could pull down on the bowel  so that if needed I could do a bowel resection.  During the time that I was preparing to do the bowel resection we then reexamined the 3 or 4 cm loop of bowel which was damaged and actually it was much healthier appearing.  Clearly the release of the tension of the femoral canal had allowed better blood flow to the bowel and all the darkness we saw was just venous congestion.  We now placed warm lap pads on the bowel and within a few minutes it had totally pinked up.  I now brought a Doppler onto the field and the Doppler clearly showed that there was excellent blood flow on the antimesenteric side of the bowel throughout the 4 cm segment.  We elected to keep the bowel.    The bowel was pushed back into the peritoneal space.  The excess peritoneal sac of the hernia was ligated using 2 stick ties of 0 Vicryl.  We then amputated the distal part of the hernia sac and passed it off as a specimen.    I then closed the soft tissues as best I could in the femoral space paying attention not to injure the femoral artery or vein.  I then closed the subcutaneous tissues with more Vicryls and I closed the skin using staples and sterile dressings were applied and the patient was extubated and taken to the recovery area in stable condition.

## 2018-03-19 NOTE — ANESTHESIA POSTPROCEDURE EVALUATION
Patient: Katheryn Goncalves    Procedure Summary     Date:  03/18/18 Room / Location:  [facility] OR [address] [place] [address] OR    Anesthesia Start:  2247 Anesthesia Stop:  2358    Procedure:  HERNIA INGUINAL/FEMORAL REPAIR OPEN (Right Groin) Diagnosis:       Bilateral inguinal hernia with obstruction      (Bilateral inguinal hernia with obstruction [K40.30])    Surgeon:  Yang Green MD Responsible Provider:  Eric Perez MD    Anesthesia Type:  general ASA Status:  2 - Emergent          Anesthesia Type: general  PACU Vitals  3/18/2018 2356 - 3/19/2018 0056      3/19/2018 0000 3/19/2018 0010 3/19/2018 0020 3/19/2018 0030    BP: (!)  149/65 (!)  141/63 (!)  149/65 (!)  142/63    Temp: 37 °C (98.6 °F) - - -    Pulse: 78 (!)  54 (!)  52 (!)  51    Resp: - (!)  10 18 16    SpO2: 100 % 100 % 100 % 100 %              3/19/2018 0040             BP: 138/63       Temp: 36.3 °C (97.3 °F)       Pulse: (!)  48       Resp: 15       SpO2: 98 %               Anesthesia Post Evaluation    Pain score: 3  Pain management: satisfactory to patient  Mode of pain management: IV medication  Patient location during evaluation: PACU  Patient participation: complete - patient participated  Level of consciousness: awake  Cardiovascular status: acceptable  Respiratory status: acceptable  Hydration status: stable  Anesthetic complications: no

## 2018-03-19 NOTE — ANESTHESIA PREPROCEDURE EVALUATION
Anesthesia ROS/MED HX    Anesthesia History    Previous anesthetics  GI/Hepatic   GERD  Endo/Other   Body Habitus: Normal      Relevant Problems   No active problems are marked relevant to this note.       Physical Exam    Airway   Mallampati: II  Cardiovascular    Rhythm: regular   Rate: normal  Pulmonary    clear to auscultation  Dental    Teeth Problems: missing        Anesthesia Plan    Plan: general    Technique: general endotracheal   ASA 2 - emergent  Blood Products:     Use of Blood Products Discussed: Yes   Anesthetic plan and risks discussed with: patient  Induction:    intravenous   Postop Plan:   Patient Disposition: inpatient floor planned admission   Pain Management: IV analgesics

## 2018-03-19 NOTE — PROGRESS NOTES
General Surgery Daily Progress Note    Subjective     Interval History: NAEO. Pain controlled, denies N/V/flatus/BM.      Objective     Vital signs in last 24 hours:  Temp:  [36.3 °C (97.3 °F)-37.5 °C (99.5 °F)] 36.3 °C (97.3 °F)  Heart Rate:  [45-78] 45  Resp:  [10-18] 16  BP: (118-149)/(50-65) 118/58      Intake/Output Summary (Last 24 hours) at 03/19/18 0579  Last data filed at 03/19/18 0421   Gross per 24 hour   Intake             2000 ml   Output              700 ml   Net             1300 ml     Intake/Output this shift:  I/O this shift:  In: 1000 [I.V.:1000]  Out: 700 [Urine:550; Emesis/NG output:150]    Physical Exam    General appearance: alert, appears stated age and cooperative  Lungs: clear to auscultation bilaterally  Chest wall: no tenderness  Heart: regular rate and rhythm, S1, S2 normal, no murmur, click, rub or gallop  Abdomen: soft, Nt, Nd, dressing CDI      Assessment/Plan   72 F POD 1 s/p R. Femoral hernia repair  - oob, ambulate, is  - dvt ppx  - f/u AM labs  - reg diet as tolerated  - diso planning  Attending: Distended, will keep on liquids till BM    Ross Brown MD

## 2018-03-19 NOTE — PLAN OF CARE
Problem: Patient Care Overview  Goal: Plan of Care Review  Outcome: Ongoing (interventions implemented as appropriate)   03/19/18 0623   Coping/Psychosocial   Plan Of Care Reviewed With patient   Plan of Care Review   Progress improving   Outcome Summary pt has no complaints of pain        Problem: Surgery Nonspecified (Adult)  Goal: Signs and Symptoms of Listed Potential Problems Will be Absent, Minimized or Managed (Surgery Nonspecified)  Outcome: Ongoing (interventions implemented as appropriate)    Goal: Anesthesia/Sedation Recovery  Outcome: Ongoing (interventions implemented as appropriate)

## 2018-03-19 NOTE — ANESTHESIA PROCEDURE NOTES
Airway  Urgency: elective    Start Time: 3/18/2018 10:55 PM  Airway not difficult    General Information and Staff    Patient location during procedure: OR  Resident/CRNA: QUINTIN SILVER    Indications and Patient Condition  Indications for airway management: anesthesia  Sedation level: deep  Preoxygenated: yes  Patient position: sniffing  MILS maintained throughout  Mask difficulty assessment: 1 - vent by mask    Final Airway Details  Final airway type: endotracheal airway      Successful airway: ETT  Cuffed: yes   Successful intubation technique: direct laryngoscopy  Blade: Mary  Blade size: #3  ETT size: 7.0 mm  Cormack-Lehane Classification: grade I - full view of glottis  Placement verified by: chest auscultation   Measured from: teeth  Number of attempts at approach: 1  Number of other approaches attempted: 0

## 2018-03-19 NOTE — PROGRESS NOTES
Met with pt at bedside.  Pt resides with son in an apt.  She is ind pta, uses no Ad.  Drives.  Declining any dc needs at this time.

## 2018-03-19 NOTE — DISCHARGE INSTRUCTIONS
Hernia  A hernia happens when an organ or tissue inside your body pushes out through a weak spot in the belly (abdomen).  Follow these instructions at home:  · Avoid stretching or overusing (straining) the muscles near the hernia.  · Do not lift anything heavier than 10 lb (4.5 kg).  · Use the muscles in your leg when you lift something up. Do not use the muscles in your back.  · When you cough, try to cough gently.  · Eat a diet that has a lot of fiber. Eat lots of fruits and vegetables.  · Drink enough fluids to keep your pee (urine) clear or pale yellow. Try to drink 6-8 glasses of water a day.  · Take medicines to make your poop soft (stool softeners) as told by your doctor.  · Lose weight, if you are overweight.  · Do not use any tobacco products, including cigarettes, chewing tobacco, or electronic cigarettes. If you need help quitting, ask your doctor.  · Keep all follow-up visits as told by your doctor. This is important.  Contact a doctor if:  · The skin by the hernia gets puffy (swollen) or red.  · The hernia is painful.  Get help right away if:  · You have a fever.  · You have belly pain that is getting worse.  · You feel sick to your stomach (nauseous) or you throw up (vomit).  · You cannot push the hernia back in place by gently pressing on it while you are lying down.  · The hernia:  ¨ Changes in shape or size.  ¨ Is stuck outside your belly.  ¨ Changes color.  ¨ Feels hard or tender.  This information is not intended to replace advice given to you by your health care provider. Make sure you discuss any questions you have with your health care provider.  Document Released: 06/07/2011 Document Revised: 05/25/2017 Document Reviewed: 10/28/2015  [employer] Interactive Patient Education © 2018 [email or web address]  DISCHARGE INSTRUCTIONS FOR    HERNIA REPAIR     Description of Procedure:  You have had a hernia (small hole in muscle of abdominal wall) repaired.      WOUND CARE:  • Your small incision is closed with  absorbable stitches that do not need to be removed.    • As your body absorbs these stitches, you may notice mild redness or discomfort around the incision or you may notice a small ridge along the incision.  This process is finished in about 3 weeks.  • Once the dressings are removed, you will see small tapes covering the incision.  Leave these tapes on until they come off by themselves.  If they remain in place after two weeks, you may remove them at that time.    DRESSINGS:  • You should remove your dressings the day after surgery.  It is not necessary to replace the dressings.  • If your clothing rubs on the incision or you have a small amount of drainage from the incision, you may keep it covered.    WASHING:  • Once the dressing has been removed, you may get the incisions wet in a shower, bath or pool.   • Gently clean the incisions with regular soap and water daily and pat dry.    ACTIVITY:  • You have no limitations on physical activity at home.    • Most patients find that 1-2 weeks of rest from physical activity speeds their healing.    • As you heal, you will notice increased fatigue during the next 2-3 weeks.  This is a normal part of the healing process.  When you feel tired, you should rest.    DRIVING:  • You may drive once you are pain free.  You cannot hurt your incisions driving a car however if you are having discomfort or taking pain medicine, your reflexes will be slower and your chances of an accident much higher.    PAIN:  • You will have a moderate amount of discomfort for 2-3 days.  After that the discomfort will lessen rapidly.    MEDICATION:  • After surgery you should immediately resume any regular medications that you take.  • For pain:  o Tylenol, 2 tablets every 4 hours as needed for mild to moderate  pain.  o Percocet, 1 tablets every 4 - 6 hours as needed for more severe pain.  o Do NOT take Tylenol and Percocet at the same time.    DIET:  • You may resume your regular diet  immediately after surgery.  • Some patients notice an upset stomach for 24 hours after surgery.  If your stomach is upset, eat lightly and avoid heavily spiced or fatty foods for 24 hours.    REGULARITY:  • Pain from surgery, inactivity, and pain medication may upset your usual bowel habits.  They will return to normal as you heal.  • If needed use Milk of Magnesia 1 oz. twice daily to help you resume regular habits.    MISCELLANEOUS:  • You have a catheter inserted into your bladder after you are asleep and removed before you wake up.  You may notice slight burning with voiding the first time or two.  This will rapidly disappear.  • You may notice some bruising of the groin or pelvic area.  This is due to  muscles to insert the mesh.  It is normal for this operation and not a cause for alarm.  It will fade over the next several weeks.  • You may notice a small lump in the area of your hernia.  This is not your hernia.  It is a small collection of blood or fluid that will disappear over the next 1-2 months.    WHEN TO CALL US: [phone]  • You should call prior to your regular follow-up appointment if you notice:  ? Significant redness around the incision.  ? Temperature of 101 degrees or above.  (Temperatures to 100.4 are normal after surgery)  ? Significant increase in the amount of pain.      **Someone is always available to answer your questions, so please call if you are concerned.

## 2018-03-20 LAB
ANION GAP SERPL CALC-SCNC: 6 MEQ/L (ref 3–15)
BASOPHILS # BLD: 0.04 K/UL (ref 0.01–0.1)
BASOPHILS NFR BLD: 0.3 %
BUN SERPL-MCNC: 12 MG/DL (ref 8–20)
CALCIUM SERPL-MCNC: 9.2 MG/DL (ref 8.9–10.3)
CASE RPRT: NORMAL
CHLORIDE SERPL-SCNC: 103 MMOL/L (ref 98–109)
CLINICAL INFO: NORMAL
CO2 SERPL-SCNC: 27 MMOL/L (ref 22–32)
CREAT SERPL-MCNC: 0.6 MG/DL (ref 0.6–1.1)
EOSINOPHIL # BLD: 0.09 K/UL (ref 0.04–0.36)
EOSINOPHIL NFR BLD: 0.8 %
ERYTHROCYTE [DISTWIDTH] IN BLOOD BY AUTOMATED COUNT: 11.8 % (ref 11.7–14.4)
GFR SERPL CREATININE-BSD FRML MDRD: >60 ML/MIN/1.73M*2
GLUCOSE SERPL-MCNC: 125 MG/DL (ref 70–99)
HCT VFR BLDCO AUTO: 39 % (ref 35–45)
HGB BLD-MCNC: 13.9 G/DL (ref 11.8–15.7)
IMM GRANULOCYTES # BLD AUTO: 0.05 K/UL (ref 0–0.08)
IMM GRANULOCYTES NFR BLD AUTO: 0.4 %
LYMPHOCYTES # BLD: 1.45 K/UL (ref 1.2–3.5)
LYMPHOCYTES NFR BLD: 12.2 %
MCH RBC QN AUTO: 33.2 PG (ref 28–33.2)
MCHC RBC AUTO-ENTMCNC: 35.6 G/DL (ref 32.2–35.5)
MCV RBC AUTO: 93.1 FL (ref 83–98)
MONOCYTES # BLD: 0.88 K/UL (ref 0.28–0.8)
MONOCYTES NFR BLD: 7.4 %
NEUTROPHILS # BLD: 9.39 K/UL (ref 1.7–7)
NEUTS SEG NFR BLD: 78.9 %
NRBC BLD-RTO: 0 %
PATH REPORT.FINAL DX SPEC: NORMAL
PATH REPORT.GROSS SPEC: NORMAL
PDW BLD AUTO: 10.1 FL (ref 9.4–12.3)
PLATELET # BLD AUTO: 187 K/UL (ref 150–369)
POTASSIUM SERPL-SCNC: 3.5 MMOL/L (ref 3.6–5.1)
RBC # BLD AUTO: 4.19 M/UL (ref 3.93–5.22)
SODIUM SERPL-SCNC: 136 MMOL/L (ref 136–144)
WBC # BLD AUTO: 11.9 K/UL (ref 3.8–10.5)

## 2018-03-20 PROCEDURE — 63600000 HC DRUGS/DETAIL CODE: Performed by: STUDENT IN AN ORGANIZED HEALTH CARE EDUCATION/TRAINING PROGRAM

## 2018-03-20 PROCEDURE — 36415 COLL VENOUS BLD VENIPUNCTURE: CPT | Performed by: PHYSICIAN ASSISTANT

## 2018-03-20 PROCEDURE — 12000000 HC ROOM AND CARE MED/SURG

## 2018-03-20 PROCEDURE — 80048 BASIC METABOLIC PNL TOTAL CA: CPT | Performed by: PHYSICIAN ASSISTANT

## 2018-03-20 PROCEDURE — 63700000 HC SELF-ADMINISTRABLE DRUG: Performed by: STUDENT IN AN ORGANIZED HEALTH CARE EDUCATION/TRAINING PROGRAM

## 2018-03-20 PROCEDURE — 99024 POSTOP FOLLOW-UP VISIT: CPT | Performed by: SURGERY

## 2018-03-20 PROCEDURE — 85025 COMPLETE CBC W/AUTO DIFF WBC: CPT | Performed by: PHYSICIAN ASSISTANT

## 2018-03-20 PROCEDURE — 25800000 HC PHARMACY IV SOLUTIONS: Performed by: STUDENT IN AN ORGANIZED HEALTH CARE EDUCATION/TRAINING PROGRAM

## 2018-03-20 RX ADMIN — HEPARIN SODIUM 5000 UNITS: 5000 INJECTION, SOLUTION INTRAVENOUS; SUBCUTANEOUS at 05:25

## 2018-03-20 RX ADMIN — HYDROMORPHONE HYDROCHLORIDE 0.5 MG: 1 INJECTION, SOLUTION INTRAMUSCULAR; INTRAVENOUS; SUBCUTANEOUS at 03:13

## 2018-03-20 RX ADMIN — HYDROMORPHONE HYDROCHLORIDE 0.5 MG: 1 INJECTION, SOLUTION INTRAMUSCULAR; INTRAVENOUS; SUBCUTANEOUS at 14:05

## 2018-03-20 RX ADMIN — HEPARIN SODIUM 5000 UNITS: 5000 INJECTION, SOLUTION INTRAVENOUS; SUBCUTANEOUS at 22:06

## 2018-03-20 RX ADMIN — HEPARIN SODIUM 5000 UNITS: 5000 INJECTION, SOLUTION INTRAVENOUS; SUBCUTANEOUS at 13:43

## 2018-03-20 RX ADMIN — PANTOPRAZOLE SODIUM 40 MG: 40 TABLET, DELAYED RELEASE ORAL at 08:18

## 2018-03-20 RX ADMIN — DEXTROSE AND SODIUM CHLORIDE: 5; 450 INJECTION, SOLUTION INTRAVENOUS at 12:14

## 2018-03-20 RX ADMIN — LEVOTHYROXINE SODIUM 100 MCG: 100 TABLET ORAL at 05:28

## 2018-03-20 RX ADMIN — ONDANSETRON 4 MG: 2 INJECTION INTRAMUSCULAR; INTRAVENOUS at 08:18

## 2018-03-20 RX ADMIN — ATORVASTATIN CALCIUM 20 MG: 20 TABLET, FILM COATED ORAL at 18:56

## 2018-03-20 RX ADMIN — ONDANSETRON 4 MG: 2 INJECTION INTRAMUSCULAR; INTRAVENOUS at 14:04

## 2018-03-20 RX ADMIN — POTASSIUM CHLORIDE 40 MEQ: 149 INJECTION, SOLUTION, CONCENTRATE INTRAVENOUS at 10:15

## 2018-03-20 NOTE — PLAN OF CARE
Problem: Patient Care Overview  Goal: Plan of Care Review  Outcome: Ongoing (interventions implemented as appropriate)   03/20/18 1125   Coping/Psychosocial   Plan Of Care Reviewed With patient   Plan of Care Review   Progress improving       Problem: Surgery Nonspecified (Adult)  Goal: Signs and Symptoms of Listed Potential Problems Will be Absent, Minimized or Managed (Surgery Nonspecified)  Outcome: Ongoing (interventions implemented as appropriate)

## 2018-03-20 NOTE — PLAN OF CARE
Problem: Patient Care Overview  Goal: Plan of Care Review  Outcome: Ongoing (interventions implemented as appropriate)   03/20/18 0516   Coping/Psychosocial   Plan Of Care Reviewed With patient   Plan of Care Review   Progress no change   Outcome Summary pt reports moderate crampy pain in abdomen relieved with medication, occasional N/V, unable to tolerate clear liquids at this time       Problem: Surgery Nonspecified (Adult)  Goal: Signs and Symptoms of Listed Potential Problems Will be Absent, Minimized or Managed (Surgery Nonspecified)  Outcome: Ongoing (interventions implemented as appropriate)    Goal: Anesthesia/Sedation Recovery  Outcome: Outcome(s) Achieved Date Met: 03/20/18

## 2018-03-20 NOTE — PROGRESS NOTES
General Surgery Daily Progress Note    Subjective     Interval History: Had some nausea with PO intake yesterday. Tolerated ice chips, but had an episode of emesis with clears. Pain controlled. No Bowel function yet. Ambulating frequently.       Objective     Vital signs in last 24 hours:  Temp:  [36.8 °C (98.3 °F)-37.6 °C (99.6 °F)] 37 °C (98.6 °F)  Heart Rate:  [45-51] 51  Resp:  [17-18] 18  BP: (113-123)/(54-58) 121/58      Intake/Output Summary (Last 24 hours) at 03/20/18 0545  Last data filed at 03/20/18 0015   Gross per 24 hour   Intake          1186.67 ml   Output              700 ml   Net           486.67 ml     Intake/Output this shift:  I/O this shift:  In: -   Out: 600 [Urine:600]    Physical Exam    General appearance: alert, appears stated age and cooperative  Lungs: clear to auscultation bilaterally  Chest wall: no tenderness  Heart: regular rate and rhythm, S1, S2 normal, no murmur, click, rub or gallop  Abdomen: soft, Nt, dressing CDI, distended      Assessment/Plan   72 F POD 2 s/p incarcerated R. Femoral hernia repair  - oob, ambulate, is  - dvt ppx  - f/u AM labs  - dispo planning  - keep on liquids until passing BM   - mIVF    Ramon Snyder MD     I saw and evaluated the patient.  I discussed the case with the Resident and agree with the findings and plan as documented in the note except for my comments below or within the additional notes today.     Her abdomen is distended but soft.  She was nauseous.  We will back off her diet and observe.      Please page with any questions or concerns.  Pager [phone]     Yang Green MD

## 2018-03-21 LAB
ANION GAP SERPL CALC-SCNC: 7 MEQ/L (ref 3–15)
BUN SERPL-MCNC: 12 MG/DL (ref 8–20)
CALCIUM SERPL-MCNC: 8.9 MG/DL (ref 8.9–10.3)
CHLORIDE SERPL-SCNC: 102 MMOL/L (ref 98–109)
CO2 SERPL-SCNC: 24 MMOL/L (ref 22–32)
CREAT SERPL-MCNC: 0.5 MG/DL (ref 0.6–1.1)
ERYTHROCYTE [DISTWIDTH] IN BLOOD BY AUTOMATED COUNT: 11.6 % (ref 11.7–14.4)
GFR SERPL CREATININE-BSD FRML MDRD: >60 ML/MIN/1.73M*2
GLUCOSE SERPL-MCNC: 119 MG/DL (ref 70–99)
HCT VFR BLDCO AUTO: 38.8 % (ref 35–45)
HGB BLD-MCNC: 14 G/DL (ref 11.8–15.7)
MCH RBC QN AUTO: 32.9 PG (ref 28–33.2)
MCHC RBC AUTO-ENTMCNC: 36.1 G/DL (ref 32.2–35.5)
MCV RBC AUTO: 91.3 FL (ref 83–98)
PDW BLD AUTO: 10.1 FL (ref 9.4–12.3)
PLATELET # BLD AUTO: 232 K/UL (ref 150–369)
POTASSIUM SERPL-SCNC: 3.8 MMOL/L (ref 3.6–5.1)
RBC # BLD AUTO: 4.25 M/UL (ref 3.93–5.22)
SODIUM SERPL-SCNC: 133 MMOL/L (ref 136–144)
WBC # BLD AUTO: 11.37 K/UL (ref 3.8–10.5)

## 2018-03-21 PROCEDURE — 63600000 HC DRUGS/DETAIL CODE: Performed by: STUDENT IN AN ORGANIZED HEALTH CARE EDUCATION/TRAINING PROGRAM

## 2018-03-21 PROCEDURE — 12000000 HC ROOM AND CARE MED/SURG

## 2018-03-21 PROCEDURE — 25800000 HC PHARMACY IV SOLUTIONS: Performed by: STUDENT IN AN ORGANIZED HEALTH CARE EDUCATION/TRAINING PROGRAM

## 2018-03-21 PROCEDURE — 63700000 HC SELF-ADMINISTRABLE DRUG: Performed by: STUDENT IN AN ORGANIZED HEALTH CARE EDUCATION/TRAINING PROGRAM

## 2018-03-21 PROCEDURE — 85027 COMPLETE CBC AUTOMATED: CPT | Performed by: STUDENT IN AN ORGANIZED HEALTH CARE EDUCATION/TRAINING PROGRAM

## 2018-03-21 PROCEDURE — 36415 COLL VENOUS BLD VENIPUNCTURE: CPT | Performed by: STUDENT IN AN ORGANIZED HEALTH CARE EDUCATION/TRAINING PROGRAM

## 2018-03-21 PROCEDURE — 80048 BASIC METABOLIC PNL TOTAL CA: CPT | Performed by: STUDENT IN AN ORGANIZED HEALTH CARE EDUCATION/TRAINING PROGRAM

## 2018-03-21 PROCEDURE — 99024 POSTOP FOLLOW-UP VISIT: CPT | Performed by: SURGERY

## 2018-03-21 RX ADMIN — ONDANSETRON 4 MG: 2 INJECTION INTRAMUSCULAR; INTRAVENOUS at 05:26

## 2018-03-21 RX ADMIN — DEXTROSE AND SODIUM CHLORIDE: 5; 450 INJECTION, SOLUTION INTRAVENOUS at 04:04

## 2018-03-21 RX ADMIN — ATORVASTATIN CALCIUM 20 MG: 20 TABLET, FILM COATED ORAL at 19:26

## 2018-03-21 RX ADMIN — DEXTROSE AND SODIUM CHLORIDE: 5; 450 INJECTION, SOLUTION INTRAVENOUS at 19:27

## 2018-03-21 RX ADMIN — HEPARIN SODIUM 5000 UNITS: 5000 INJECTION, SOLUTION INTRAVENOUS; SUBCUTANEOUS at 13:31

## 2018-03-21 RX ADMIN — ACETAMINOPHEN 650 MG: 325 TABLET, FILM COATED ORAL at 19:56

## 2018-03-21 RX ADMIN — HEPARIN SODIUM 5000 UNITS: 5000 INJECTION, SOLUTION INTRAVENOUS; SUBCUTANEOUS at 05:46

## 2018-03-21 RX ADMIN — LEVOTHYROXINE SODIUM 100 MCG: 100 TABLET ORAL at 06:09

## 2018-03-21 RX ADMIN — HEPARIN SODIUM 5000 UNITS: 5000 INJECTION, SOLUTION INTRAVENOUS; SUBCUTANEOUS at 22:56

## 2018-03-21 RX ADMIN — ACETAMINOPHEN 650 MG: 325 TABLET, FILM COATED ORAL at 05:45

## 2018-03-21 RX ADMIN — PANTOPRAZOLE SODIUM 40 MG: 40 TABLET, DELAYED RELEASE ORAL at 08:25

## 2018-03-21 NOTE — PLAN OF CARE
Problem: Patient Care Overview  Goal: Plan of Care Review  Outcome: Ongoing (interventions implemented as appropriate)   03/21/18 0620   Coping/Psychosocial   Plan Of Care Reviewed With patient   Plan of Care Review   Progress improving   Outcome Summary pt medicated once for c/o nausea no further vomiting, ambulates in hallway       Problem: Surgery Nonspecified (Adult)  Goal: Signs and Symptoms of Listed Potential Problems Will be Absent, Minimized or Managed (Surgery Nonspecified)  Outcome: Ongoing (interventions implemented as appropriate)

## 2018-03-21 NOTE — PLAN OF CARE
Problem: Patient Care Overview  Goal: Plan of Care Review  Outcome: Ongoing (interventions implemented as appropriate)      Problem: Surgery Nonspecified (Adult)  Goal: Signs and Symptoms of Listed Potential Problems Will be Absent, Minimized or Managed (Surgery Nonspecified)  Outcome: Ongoing (interventions implemented as appropriate)

## 2018-03-21 NOTE — PROGRESS NOTES
General Surgery Daily Progress Note    Subjective     Interval History: Patient had nausea and vomited yesterday. She is still feeling nauseous this morning. No bowel function yet. Pain controlled.      Objective     Vital signs in last 24 hours:  Temp:  [36.6 °C (97.8 °F)-37.2 °C (99 °F)] 37.2 °C (99 °F)  Heart Rate:  [52-68] 68  Resp:  [16-18] 16  BP: (126-141)/(60-69) 126/60      Intake/Output Summary (Last 24 hours) at 03/21/18 0602  Last data filed at 03/21/18 0404   Gross per 24 hour   Intake          3574.67 ml   Output              550 ml   Net          3024.67 ml     Intake/Output this shift:  I/O this shift:  In: 640 [I.V.:640]  Out: 300 [Urine:300]    Physical Exam    General appearance: alert, appears stated age and cooperative  Lungs: clear to auscultation bilaterally  Heart: regular rate and rhythm, S1, S2 normal, no murmur, click, rub or gallop  Abdomen: Soft, appropriately tender  Extremities: extremities normal, warm and well-perfused; no cyanosis, clubbing, or edema      VTE Assessment: I have reassessed and the patient's VTE risk and treatment plan is appropriate.    Labs  CBC Results       03/20/18 03/19/18 03/18/18                    0549 0528 1752         WBC 11.90 (H) 10.75 (H) 8.85         RBC 4.19 4.07 4.20         HGB 13.9 13.5 13.9         HCT 39.0 37.3 37.9         MCV 93.1 91.6 90.2         MCH 33.2 33.2 33.1         MCHC 35.6 (H) 36.2 (H) 36.7 (H)          194 205         Comment for PLT at 1752 on 03/18/18:  PLT CLUMPING SUSPECTED. PLT COUNT MAY BE SLIGHTLY HIGHER THAN REPORTED. IF CLINICALLY WARRENTED, SUGGEST COLLECTIN NA CITRATE TUBE (BLUE TOP) IN ADDITION TO EDTA TUBE FOR FOLLOW UP CBC TESTING        BMP Results       03/20/18 03/19/18 03/18/18                    0549 0528 1752          138 133 (L)         K 3.5 (L) 3.9 3.7         Cl 103 108 96 (L)         CO2 27 24 25         Glucose 125 (H) 146 (H) 140 (H)         BUN 12 9 12         Creatinine 0.6 0.6 0.5 (L)          Calcium 9.2 8.9 9.5         Anion Gap 6 6 12         EGFR &gt;60.0 &gt;60.0 &gt;60.0         Comment for K at 1752 on 03/18/18:    Results obtained on plasma. Plasma Potassium values may be up to 0.4 mEQ/L less than serum values. The differences may be greater for patients with high platelet or white cell counts.          Imaging  Not applicable      Assessment/Plan     Expected Discharge Date:   3/19/2018    72 F POD 2 s/p incarcerated R. Femoral hernia repair    Patient had nausea and vomited yesterday. She is still feeling nauseous this morning. No bowel function yet. Pain controlled.    - Pain control  - Keep on clears for now  - Encourage ambulation and IS  - Pain control and anti-emetics  - Monitor VS and UO    Duglas Hodge MD     I saw and evaluated the patient.  I discussed the case with the Resident and agree with the findings and plan as documented in the note except for my comments below or within the additional notes today.     She actually feels better this morning.  Walking a lot.  Less nausea.  Sips of clear.  We will wait this out.  Likely postop ileus after acute obstruction.  Will keep her on IVF.    Please page with any questions or concerns.  Pager [phone]     Yang Green MD

## 2018-03-21 NOTE — PLAN OF CARE
Problem: Patient Care Overview  Goal: Plan of Care Review  Outcome: Ongoing (interventions implemented as appropriate)   03/20/18 1390   Coping/Psychosocial   Plan Of Care Reviewed With patient   Plan of Care Review   Progress no change   Outcome Summary pt reports intermittent cramping pain does not want to take narcotic pain medications, unable to tolerate diet, threw up 700cc at 2230, unable to pass gas       Problem: Surgery Nonspecified (Adult)  Goal: Signs and Symptoms of Listed Potential Problems Will be Absent, Minimized or Managed (Surgery Nonspecified)  Outcome: Ongoing (interventions implemented as appropriate)

## 2018-03-22 ENCOUNTER — APPOINTMENT (INPATIENT)
Dept: RADIOLOGY | Facility: HOSPITAL | Age: 73
DRG: 352 | End: 2018-03-22
Attending: STUDENT IN AN ORGANIZED HEALTH CARE EDUCATION/TRAINING PROGRAM
Payer: MEDICARE

## 2018-03-22 LAB
ANION GAP SERPL CALC-SCNC: 10 MEQ/L (ref 3–15)
BUN SERPL-MCNC: 16 MG/DL (ref 8–20)
CALCIUM SERPL-MCNC: 9.1 MG/DL (ref 8.9–10.3)
CHLORIDE SERPL-SCNC: 101 MMOL/L (ref 98–109)
CO2 SERPL-SCNC: 23 MMOL/L (ref 22–32)
CREAT SERPL-MCNC: 0.6 MG/DL (ref 0.6–1.1)
ERYTHROCYTE [DISTWIDTH] IN BLOOD BY AUTOMATED COUNT: 11.3 % (ref 11.7–14.4)
GFR SERPL CREATININE-BSD FRML MDRD: >60 ML/MIN/1.73M*2
GLUCOSE SERPL-MCNC: 128 MG/DL (ref 70–99)
HCT VFR BLDCO AUTO: 36.5 % (ref 35–45)
HGB BLD-MCNC: 13.6 G/DL (ref 11.8–15.7)
MCH RBC QN AUTO: 33.4 PG (ref 28–33.2)
MCHC RBC AUTO-ENTMCNC: 37.3 G/DL (ref 32.2–35.5)
MCV RBC AUTO: 89.7 FL (ref 83–98)
PDW BLD AUTO: 9.9 FL (ref 9.4–12.3)
PLATELET # BLD AUTO: 217 K/UL (ref 150–369)
POTASSIUM SERPL-SCNC: 3.4 MMOL/L (ref 3.6–5.1)
RBC # BLD AUTO: 4.07 M/UL (ref 3.93–5.22)
SODIUM SERPL-SCNC: 134 MMOL/L (ref 136–144)
WBC # BLD AUTO: 9.32 K/UL (ref 3.8–10.5)

## 2018-03-22 PROCEDURE — 63700000 HC SELF-ADMINISTRABLE DRUG: Performed by: STUDENT IN AN ORGANIZED HEALTH CARE EDUCATION/TRAINING PROGRAM

## 2018-03-22 PROCEDURE — 80048 BASIC METABOLIC PNL TOTAL CA: CPT | Performed by: STUDENT IN AN ORGANIZED HEALTH CARE EDUCATION/TRAINING PROGRAM

## 2018-03-22 PROCEDURE — 74022 RADEX COMPL AQT ABD SERIES: CPT

## 2018-03-22 PROCEDURE — 99024 POSTOP FOLLOW-UP VISIT: CPT | Performed by: SURGERY

## 2018-03-22 PROCEDURE — 85027 COMPLETE CBC AUTOMATED: CPT | Performed by: STUDENT IN AN ORGANIZED HEALTH CARE EDUCATION/TRAINING PROGRAM

## 2018-03-22 PROCEDURE — 63700000 HC SELF-ADMINISTRABLE DRUG: Performed by: SURGERY

## 2018-03-22 PROCEDURE — 12000000 HC ROOM AND CARE MED/SURG

## 2018-03-22 PROCEDURE — 63600000 HC DRUGS/DETAIL CODE: Performed by: STUDENT IN AN ORGANIZED HEALTH CARE EDUCATION/TRAINING PROGRAM

## 2018-03-22 PROCEDURE — 25800000 HC PHARMACY IV SOLUTIONS: Performed by: STUDENT IN AN ORGANIZED HEALTH CARE EDUCATION/TRAINING PROGRAM

## 2018-03-22 PROCEDURE — 36415 COLL VENOUS BLD VENIPUNCTURE: CPT | Performed by: STUDENT IN AN ORGANIZED HEALTH CARE EDUCATION/TRAINING PROGRAM

## 2018-03-22 RX ORDER — HYDROGEN PEROXIDE 3 %
40 SOLUTION, NON-ORAL MISCELLANEOUS DAILY
Status: DISCONTINUED | OUTPATIENT
Start: 2018-03-22 | End: 2018-03-24 | Stop reason: HOSPADM

## 2018-03-22 RX ORDER — ALUMINUM HYDROXIDE, MAGNESIUM HYDROXIDE, AND SIMETHICONE 1200; 120; 1200 MG/30ML; MG/30ML; MG/30ML
15 SUSPENSION ORAL EVERY 6 HOURS PRN
Status: DISCONTINUED | OUTPATIENT
Start: 2018-03-22 | End: 2018-03-24 | Stop reason: HOSPADM

## 2018-03-22 RX ADMIN — Medication 120 ML: at 13:28

## 2018-03-22 RX ADMIN — POTASSIUM CHLORIDE 40 MEQ: 149 INJECTION, SOLUTION, CONCENTRATE INTRAVENOUS at 08:52

## 2018-03-22 RX ADMIN — HEPARIN SODIUM 5000 UNITS: 5000 INJECTION, SOLUTION INTRAVENOUS; SUBCUTANEOUS at 21:06

## 2018-03-22 RX ADMIN — LEVOTHYROXINE SODIUM 100 MCG: 100 TABLET ORAL at 05:50

## 2018-03-22 RX ADMIN — ACETAMINOPHEN 650 MG: 325 TABLET, FILM COATED ORAL at 02:16

## 2018-03-22 RX ADMIN — ATORVASTATIN CALCIUM 20 MG: 20 TABLET, FILM COATED ORAL at 18:23

## 2018-03-22 RX ADMIN — ONDANSETRON 4 MG: 2 INJECTION INTRAMUSCULAR; INTRAVENOUS at 05:52

## 2018-03-22 RX ADMIN — HEPARIN SODIUM 5000 UNITS: 5000 INJECTION, SOLUTION INTRAVENOUS; SUBCUTANEOUS at 05:50

## 2018-03-22 RX ADMIN — HEPARIN SODIUM 5000 UNITS: 5000 INJECTION, SOLUTION INTRAVENOUS; SUBCUTANEOUS at 14:59

## 2018-03-22 RX ADMIN — DEXTROSE AND SODIUM CHLORIDE: 5; 450 INJECTION, SOLUTION INTRAVENOUS at 15:32

## 2018-03-22 RX ADMIN — Medication 40 MG: at 18:23

## 2018-03-22 NOTE — PLAN OF CARE
Problem: Patient Care Overview  Goal: Plan of Care Review  Outcome: Ongoing (interventions implemented as appropriate)   03/22/18 1701   Coping/Psychosocial   Plan Of Care Reviewed With patient   Plan of Care Review   Progress improving   Outcome Summary patient had BM today, ambulating in the [place]       Problem: Surgery Nonspecified (Adult)  Goal: Signs and Symptoms of Listed Potential Problems Will be Absent, Minimized or Managed (Surgery Nonspecified)  Outcome: Ongoing (interventions implemented as appropriate)

## 2018-03-22 NOTE — PROGRESS NOTES
General Surgery Daily Progress Note    Subjective     Interval History:   No acute events overnight. Patient with small amount of Flatus yesterday. No N/V, no F/C, no CP/SOB. Trini sips of water. No BM yet. Lots of walking      Objective     Vital signs in last 24 hours:  Temp:  [37 °C (98.6 °F)-37.1 °C (98.8 °F)] 37 °C (98.6 °F)  Heart Rate:  [65-81] 81  Resp:  [16] 16  BP: (113-149)/(66-79) 132/79      Intake/Output Summary (Last 24 hours) at 03/22/18 0511  Last data filed at 03/21/18 2300   Gross per 24 hour   Intake              640 ml   Output              350 ml   Net              290 ml     Intake/Output this shift:  I/O this shift:  In: -   Out: 100 [Urine:100]    Physical Exam    General appearance: alert, appears stated age and cooperative  Lungs: clear to auscultation bilaterally  Heart: regular rate and rhythm, S1, S2 normal, no murmur, click, rub or gallop  Abdomen: Soft, appropriately tender, distended, unchanged. Inc c/d/i  Extremities: extremities normal, warm and well-perfused; no cyanosis, clubbing, or edema      VTE Assessment: I have reassessed and the patient's VTE risk and treatment plan is appropriate.    Labs  CBC Results       03/20/18 03/19/18 03/18/18                    0549 0528 1752         WBC 11.90 (H) 10.75 (H) 8.85         RBC 4.19 4.07 4.20         HGB 13.9 13.5 13.9         HCT 39.0 37.3 37.9         MCV 93.1 91.6 90.2         MCH 33.2 33.2 33.1         MCHC 35.6 (H) 36.2 (H) 36.7 (H)          194 205         Comment for PLT at 1752 on 03/18/18:  PLT CLUMPING SUSPECTED. PLT COUNT MAY BE SLIGHTLY HIGHER THAN REPORTED. IF CLINICALLY WARRENTED, SUGGEST COLLECTIN NA CITRATE TUBE (BLUE TOP) IN ADDITION TO EDTA TUBE FOR FOLLOW UP CBC TESTING        BMP Results       03/20/18 03/19/18 03/18/18                    0549 0528 1752          138 133 (L)         K 3.5 (L) 3.9 3.7         Cl 103 108 96 (L)         CO2 27 24 25         Glucose 125 (H) 146 (H) 140 (H)         BUN 12 9 12          Creatinine 0.6 0.6 0.5 (L)         Calcium 9.2 8.9 9.5         Anion Gap 6 6 12         EGFR &gt;60.0 &gt;60.0 &gt;60.0         Comment for K at 1752 on 03/18/18:    Results obtained on plasma. Plasma Potassium values may be up to 0.4 mEQ/L less than serum values. The differences may be greater for patients with high platelet or white cell counts.          Imaging  Not applicable      Assessment/Plan     Expected Discharge Date:   3/19/2018    72 F POD 3 s/p incarcerated R. Femoral hernia repair, Improved. +flatus.    - Pain control  - Clears  - Encourage ambulation and IS  - Pain control and anti-emetics  - Monitor VS and UO    Ross Brown MD     I saw and evaluated the patient.  I discussed the case with the Resident and agree with the findings and plan as documented in the note except for my comments below or within the additional notes today.     Pt now has thrown up again.  Cannot shahzad clears.  PE shows mod distended abd but not massive.  No tenderness.  There is no recurrence of hernia in groin.  OBS today shows NO sign of ileus.  Nonspecific gas pattern.  No dilated stomach.  NG would do no good I think.  There is much hard stool in colon.  My plan is tap water enema and observe.  If this does not help then gastrograffin SBFT tomorrow.        Please page with any questions or concerns.  Pager [phone]     Yang Green MD

## 2018-03-22 NOTE — PLAN OF CARE
Problem: Patient Care Overview  Goal: Plan of Care Review  Outcome: Ongoing (interventions implemented as appropriate)   03/21/18 2845   Coping/Psychosocial   Plan Of Care Reviewed With patient   Plan of Care Review   Progress improving   Outcome Summary pt passed small amounts of gas, no N/V this shift, pain is controlled by PO Tylenol       Problem: Surgery Nonspecified (Adult)  Goal: Signs and Symptoms of Listed Potential Problems Will be Absent, Minimized or Managed (Surgery Nonspecified)  Outcome: Ongoing (interventions implemented as appropriate)

## 2018-03-23 LAB
ANION GAP SERPL CALC-SCNC: 5 MEQ/L (ref 3–15)
BUN SERPL-MCNC: 16 MG/DL (ref 8–20)
CALCIUM SERPL-MCNC: 8.4 MG/DL (ref 8.9–10.3)
CHLORIDE SERPL-SCNC: 109 MMOL/L (ref 98–109)
CO2 SERPL-SCNC: 22 MMOL/L (ref 22–32)
CREAT SERPL-MCNC: 0.5 MG/DL (ref 0.6–1.1)
ERYTHROCYTE [DISTWIDTH] IN BLOOD BY AUTOMATED COUNT: 11.5 % (ref 11.7–14.4)
GFR SERPL CREATININE-BSD FRML MDRD: >60 ML/MIN/1.73M*2
GLUCOSE SERPL-MCNC: 90 MG/DL (ref 70–99)
HCT VFR BLDCO AUTO: 31.2 % (ref 35–45)
HGB BLD-MCNC: 11.4 G/DL (ref 11.8–15.7)
MCH RBC QN AUTO: 33.8 PG (ref 28–33.2)
MCHC RBC AUTO-ENTMCNC: 36.5 G/DL (ref 32.2–35.5)
MCV RBC AUTO: 92.6 FL (ref 83–98)
PDW BLD AUTO: 10 FL (ref 9.4–12.3)
PLATELET # BLD AUTO: 156 K/UL (ref 150–369)
POTASSIUM SERPL-SCNC: 3.6 MMOL/L (ref 3.6–5.1)
RBC # BLD AUTO: 3.37 M/UL (ref 3.93–5.22)
SODIUM SERPL-SCNC: 136 MMOL/L (ref 136–144)
WBC # BLD AUTO: 5.32 K/UL (ref 3.8–10.5)

## 2018-03-23 PROCEDURE — 63600000 HC DRUGS/DETAIL CODE: Performed by: STUDENT IN AN ORGANIZED HEALTH CARE EDUCATION/TRAINING PROGRAM

## 2018-03-23 PROCEDURE — 99024 POSTOP FOLLOW-UP VISIT: CPT | Performed by: SURGERY

## 2018-03-23 PROCEDURE — 80048 BASIC METABOLIC PNL TOTAL CA: CPT | Performed by: STUDENT IN AN ORGANIZED HEALTH CARE EDUCATION/TRAINING PROGRAM

## 2018-03-23 PROCEDURE — 85027 COMPLETE CBC AUTOMATED: CPT | Performed by: STUDENT IN AN ORGANIZED HEALTH CARE EDUCATION/TRAINING PROGRAM

## 2018-03-23 PROCEDURE — 63700000 HC SELF-ADMINISTRABLE DRUG: Performed by: STUDENT IN AN ORGANIZED HEALTH CARE EDUCATION/TRAINING PROGRAM

## 2018-03-23 PROCEDURE — 12000000 HC ROOM AND CARE MED/SURG

## 2018-03-23 PROCEDURE — 36415 COLL VENOUS BLD VENIPUNCTURE: CPT | Performed by: STUDENT IN AN ORGANIZED HEALTH CARE EDUCATION/TRAINING PROGRAM

## 2018-03-23 RX ADMIN — LEVOTHYROXINE SODIUM 100 MCG: 100 TABLET ORAL at 05:43

## 2018-03-23 RX ADMIN — Medication 40 MG: at 08:51

## 2018-03-23 RX ADMIN — HEPARIN SODIUM 5000 UNITS: 5000 INJECTION, SOLUTION INTRAVENOUS; SUBCUTANEOUS at 13:53

## 2018-03-23 RX ADMIN — HEPARIN SODIUM 5000 UNITS: 5000 INJECTION, SOLUTION INTRAVENOUS; SUBCUTANEOUS at 05:43

## 2018-03-23 RX ADMIN — ATORVASTATIN CALCIUM 20 MG: 20 TABLET, FILM COATED ORAL at 17:25

## 2018-03-23 RX ADMIN — HEPARIN SODIUM 5000 UNITS: 5000 INJECTION, SOLUTION INTRAVENOUS; SUBCUTANEOUS at 21:15

## 2018-03-23 NOTE — DISCHARGE SUMMARY
Inpatient Discharge Summary    BRIEF OVERVIEW  Admitting Provider:  H&P Notes 2/21/2018 to 3/23/2018     Date of Service Author Author Type Status Note Type File Time    03/18/18 9661 Yang Green MD Physician Signed H&P 03/18/18 1684          Attending Provider: Yang Green MD Attending phys phone: [phone]  Primary Care Physician at Discharge: Sugar Daugherty [phone]    Admission Date: 3/18/2018     Discharge Date: 3/24/2018    Primary Discharge Diagnosis  Incarcerated femoral hernia    Secondary Discharge Diagnosis  Active Hospital Problems    Diagnosis Date Noted   • Incarcerated femoral hernia 03/18/2018      Resolved Hospital Problems    Diagnosis Date Noted Date Resolved   No resolved problems to display.       DETAILS OF HOSPITAL STAY    Operative Procedures Performed  Procedure(s):  HERNIA INGUINAL/FEMORAL REPAIR OPEN    Consults:       Consult Orders During Admission:  None     Procedures: R femoral hernia reduction and repair   Pertinent Test Results: see below    Imaging  X-ray Obstruction Series (abdomen 2 Views With Chest 1 View)    Result Date: 3/22/2018  IMPRESSION: 1.  Nonspecific bowel gas pattern.  Relative paucity of small bowel gas, in keeping with the clinical history of postoperative ileus.  No definite evidence of a high-grade bowel obstruction or free air 2.  No acute cardiopulmonary disease    Ct Abdomen Pelvis With Iv Contrast    Result Date: 3/18/2018  IMPRESSION: *  Findings most consistent with partial or early small bowel obstruction with a transition point seen within a right inguinal hernia which contains loops of small bowel. *  The stomach is dilated with prominent and thickened gastric folds, correlate for gastritis. *  Colonic diverticulosis. *  Normal appendix. *  Nonspecific mild intrahepatic and extrahepatic biliary ductal dilatation, which could be within normal limits in a postcholecystectomy state.  Follow-up may be considered if  clinically warranted. * I certify that I have reviewed this examination and agree with this report. Mary Tinoco MD          Presenting Problem/History of Present Illness  Incarcerated inguinal hernia     72F who p/w RLQ pain and a newly noticed lump in her R groin the day of admission. Also had nausea and several bouts of emesis. In the Ed, CT scan demonstrated a small bowel obstruction at the point of an incarcerated R inguinal hernia. She was admitted for operative exploration and repair.     Exam on Day of Discharge  Patient seen and examined on day of discharge.  Tolerating full diet, abdomen soft and appropriately tender     Hospital Course  She was taken to the OR w/ Dr Alexandru Green for a R femoral hernia repair. Please see the dictated op note for further details. Her bowels were reduced into the abdominal cavity and her hernia repaired without a bowel resection. Immediately postop, she was allowed to trial a diet but did not tolerate this well. She had some emesis with attempts at clears for several days. She began to have consistent flatus and large Bms on POD4. She then tolerated advancement of her diet without difficulty.    Discharge Orders  Released Discharge Orders     Order Details Provider Status    atorvastatin (LIPITOR) 20 mg tablet Take 20 mg by mouth daily. Husam Santana MD Resume at Discharge (Patient Reported)    esomeprazole (NexIUM) 40 mg capsule Take 40 mg by mouth daily. Husam Santana MD Resume at Discharge (Patient Reported)    acetaminophen (TYLENOL) tablet 650 mg 650 mg, oral, Every 6 hours PRN, pain, Starting Mon 3/19/18 at 1240, For 90 days Husam Santana MD Do Not Order at Discharge    alum-mag hydroxide-simeth (MAALOX) 200-200-20 mg/5 mL suspension 15 mL 15 mL, oral, Every 6 hours PRN, indigestion, heartburn, Starting Thu 3/22/18 at 0511, For 90 days** Shake well before administration ** Husam Santana MD Do Not Order at Discharge    atorvastatin (LIPITOR) tablet 20 mg  20 mg, oral, Daily (6p), First dose on Mon 3/19/18 at 1800 Husam Santana MD Do Not Order at Discharge    dextrose 40 % oral gel 15-30 g of dextrose 15-30 g of dextrose, oral, As needed, low blood sugar, Blood Glucose <= 70, Starting Sun 3/18/18 at 2041, For 90 days* If unable to chew but able to take PO * Hypoglycemia (Adult)  Blood Glucose 51 mg/dL - 70 mg/dL -- Administer 15 grams of simple carbohydrates (1 tube of glucose gel) Blood Glucose <= 50 mg/dL -- Administer 30 grams of simple carbohydrates (2 tubes of glucose gel) Husam Santana MD Do Not Order at Discharge    dextrose in water injection 12.5 g 12.5 g (25 mL), intravenous, As needed, low blood sugar, Blood Glucose <= 70, Starting Sun 3/18/18 at 2041, For 90 days* If NPO or unable to swallow and has IV access * Husam Santana MD Do Not Order at Discharge    esomeprazole (NexIUM) capsule,delayed release(DR/EC) 40 mg 40 mg, oral, Daily, First dose on Thu 3/22/18 at 1800Patient may supply ** This oral dosage form should not be crushed or pierced **Exception when administered via tube:The capsule may be opened, added to a small amount of water,and administered immediately via tubeDrug Name: NexiumForm: capsuleIndication for Therapy: GERDLength of Therapy: 2 daysReason for Non-Formulary: Patient preference Husam Santana MD Do Not Order at Discharge    glucagon (GLUCAGEN) injection 1 mg 1 mg, intramuscular, As needed, low blood sugar, Blood Glucose <= 70, Starting Sun 3/18/18 at 2041, For 90 days* If NPO or unable to swallow and does not have IV access * Husam Santana MD Do Not Order at Discharge    glucose chewable tablet 16-32 g of dextrose 16-32 g of dextrose, oral, As needed, low blood sugar, Blood Glucose <= 70, Starting Sun 3/18/18 at 2041, For 90 days* If able to take PO * Hypoglycemia (Adult)  Blood Glucose 51 mg/dL - 70 mg/dL -- Administer 16 grams of simple carbohydrates (4 glucose tablets) Blood Glucose <= 50 mg/dL -- Administer 32 grams  of simple carbohydrates (8 glucose tablets) Husam Santana MD Do Not Order at Discharge    heparin (porcine) 5,000 unit/mL injection 5,000 Units 5,000 Units, subcutaneous, Every 8 hours, First dose on Mon 3/19/18 at 0600, For 90 daysBegin 6am post-op day 1 Husam Santana MD Do Not Order at Discharge    HYDROmorphone (DILAUDID) 2 mg tablet Take 1-2 tablets (2-4 mg total) by mouth every 4 (four) hours as needed for moderate pain or severe pain for up to 8 days. Husam Santana MD Prescribed    HYDROmorphone (DILAUDID) injection 0.25-0.5 mg 0.25-0.5 mg, intravenous, Every 3 hours PRN, pain, Starting Sun 3/18/18 at 2044, For 14 daysIf unable to use oral medication. Start with 0.25 mg; if pain not relieved, and patient not sedated, may increase subsequent doses. May be given with Acetaminophen/NSAIDs for multimodal analgesia. Husam Santana MD Do Not Order at Discharge    levothyroxine (SYNTHROID) tablet 100 mcg 100 mcg, oral, Daily (6:30a), First dose on Mon 3/19/18 at 0815Take on empty stomach; 30 minutes before food. Husam Santana MD Do Not Order at Discharge    LEVOTHYROXINE SODIUM (SYNTHROID ORAL) Take 100 mcg by mouth daily.   Husam Santana MD Resume at Discharge (Patient Reported)    ondansetron (ZOFRAN) injection 4 mg 4 mg, intravenous, Every 8 hours PRN, nausea, vomiting, Starting Mon 3/19/18 at 2337, For 90 days Husam Santana MD Do Not Order at Discharge    Clinic Appointment Request Follow up; [facility] Future, Expected: 4/7/2018, Expires: 3/24/2019, Schedule appointment within 3 days before and 3 days after Husam Santana MD New at Discharge          Outpatient Follow-Ups  No future appointments.  Referrals:  No orders of the defined types were placed in this encounter.        Test Results Pending at Discharge  Unresulted Labs     None            Discharge Disposition  Home   Code Status at Discharge: Full Code  Physician Order for Life-Sustaining Treatment Document Status      No  documents found

## 2018-03-23 NOTE — PROGRESS NOTES
General Surgery Daily Progress Note    Subjective     Interval History:   No acute events overnight. Patient with large amount of Flatus and BM overnight, feeling much better. No N/V, no F/C, no CP/SOB. Trini sips of water. Lots of walking      Objective     Vital signs in last 24 hours:  Temp:  [36.9 °C (98.5 °F)-37.2 °C (99 °F)] 36.9 °C (98.5 °F)  Heart Rate:  [65-72] 67  Resp:  [16] 16  BP: (114-129)/(58-68) 114/58      Intake/Output Summary (Last 24 hours) at 03/23/18 0552  Last data filed at 03/23/18 0543   Gross per 24 hour   Intake                0 ml   Output             1550 ml   Net            -1550 ml     Intake/Output this shift:  I/O this shift:  In: -   Out: 200 [Urine:200]    Physical Exam    General appearance: alert, appears stated age and cooperative  Lungs: clear to auscultation bilaterally  Heart: regular rate and rhythm, S1, S2 normal, no murmur, click, rub or gallop  Abdomen: Soft, appropriately tender, distended, unchanged. Inc c/d/i  Extremities: extremities normal, warm and well-perfused; no cyanosis, clubbing, or edema      VTE Assessment: I have reassessed and the patient's VTE risk and treatment plan is appropriate.    Labs  CBC Results       03/20/18 03/19/18 03/18/18                    0549 0528 1752         WBC 11.90 (H) 10.75 (H) 8.85         RBC 4.19 4.07 4.20         HGB 13.9 13.5 13.9         HCT 39.0 37.3 37.9         MCV 93.1 91.6 90.2         MCH 33.2 33.2 33.1         MCHC 35.6 (H) 36.2 (H) 36.7 (H)          194 205         Comment for PLT at 1752 on 03/18/18:  PLT CLUMPING SUSPECTED. PLT COUNT MAY BE SLIGHTLY HIGHER THAN REPORTED. IF CLINICALLY WARRENTED, SUGGEST COLLECTIN NA CITRATE TUBE (BLUE TOP) IN ADDITION TO EDTA TUBE FOR FOLLOW UP CBC TESTING        BMP Results       03/20/18 03/19/18 03/18/18                    0549 0528 1752          138 133 (L)         K 3.5 (L) 3.9 3.7         Cl 103 108 96 (L)         CO2 27 24 25         Glucose 125 (H) 146 (H) 140 (H)          BUN 12 9 12         Creatinine 0.6 0.6 0.5 (L)         Calcium 9.2 8.9 9.5         Anion Gap 6 6 12         EGFR &gt;60.0 &gt;60.0 &gt;60.0         Comment for K at 1752 on 03/18/18:    Results obtained on plasma. Plasma Potassium values may be up to 0.4 mEQ/L less than serum values. The differences may be greater for patients with high platelet or white cell counts.          Imaging  Not applicable      Assessment/Plan     72 F POD 4 s/p incarcerated R. Femoral hernia repair, Improved. +flatus/BM    - Pain control  - Clears today  - Encourage ambulation and IS  - Pain control and anti-emetics  - Monitor VS and UO    Ross Brown MD     I saw and evaluated the patient.  I discussed the case with the Resident and agree with the findings and plan as documented in the note except for my comments below or within the additional notes today.     Pt looks good today.  Had lots of bowel movements and gas yesterday after enema.  abd much less distended.  Will try clears again today.    Please page with any questions or concerns.  Pager [phone]     Yang Green MD

## 2018-03-23 NOTE — PLAN OF CARE
Problem: Patient Care Overview  Goal: Plan of Care Review  Outcome: Ongoing (interventions implemented as appropriate)   03/23/18 0616   Coping/Psychosocial   Plan Of Care Reviewed With patient   Plan of Care Review   Progress improving   Outcome Summary Pt denies abdominal pain, reports + BM, ambulating independently in hallway, denies N/V

## 2018-03-23 NOTE — PLAN OF CARE
Problem: Patient Care Overview  Goal: Plan of Care Review  Outcome: Ongoing (interventions implemented as appropriate)   03/23/18 5188   Coping/Psychosocial   Plan Of Care Reviewed With patient   Plan of Care Review   Progress improving   Outcome Summary pt tolerating clear liquids without N/V, abdominal pain will continue to monitor      Goal: Discharge Needs Assessment  Outcome: Ongoing (interventions implemented as appropriate)    Goal: Interprofessional Rounds/Family Conf  Outcome: Ongoing (interventions implemented as appropriate)

## 2018-03-24 VITALS
WEIGHT: 133 LBS | OXYGEN SATURATION: 98 % | SYSTOLIC BLOOD PRESSURE: 126 MMHG | BODY MASS INDEX: 22.16 KG/M2 | RESPIRATION RATE: 16 BRPM | TEMPERATURE: 97.6 F | DIASTOLIC BLOOD PRESSURE: 60 MMHG | HEART RATE: 63 BPM | HEIGHT: 65 IN

## 2018-03-24 PROBLEM — K21.9 ESOPHAGEAL REFLUX: Status: ACTIVE | Noted: 2017-09-22

## 2018-03-24 PROBLEM — E03.9 HYPOTHYROIDISM: Status: ACTIVE | Noted: 2017-09-22

## 2018-03-24 PROCEDURE — 63600000 HC DRUGS/DETAIL CODE: Performed by: STUDENT IN AN ORGANIZED HEALTH CARE EDUCATION/TRAINING PROGRAM

## 2018-03-24 PROCEDURE — 63700000 HC SELF-ADMINISTRABLE DRUG: Performed by: STUDENT IN AN ORGANIZED HEALTH CARE EDUCATION/TRAINING PROGRAM

## 2018-03-24 PROCEDURE — 99024 POSTOP FOLLOW-UP VISIT: CPT | Performed by: SURGERY

## 2018-03-24 RX ORDER — BISACODYL 10 MG/1
10 SUPPOSITORY RECTAL ONCE
Status: COMPLETED | OUTPATIENT
Start: 2018-03-24 | End: 2018-03-24

## 2018-03-24 RX ORDER — HYDROMORPHONE HYDROCHLORIDE 2 MG/1
2-4 TABLET ORAL EVERY 4 HOURS PRN
Qty: 30 TABLET | Refills: 0
Start: 2018-03-24 | End: 2019-05-30 | Stop reason: ALTCHOICE

## 2018-03-24 RX ADMIN — Medication 40 MG: at 06:41

## 2018-03-24 RX ADMIN — BISACODYL 10 MG: 10 SUPPOSITORY RECTAL at 06:39

## 2018-03-24 RX ADMIN — LEVOTHYROXINE SODIUM 100 MCG: 100 TABLET ORAL at 05:45

## 2018-03-24 RX ADMIN — HEPARIN SODIUM 5000 UNITS: 5000 INJECTION, SOLUTION INTRAVENOUS; SUBCUTANEOUS at 05:47

## 2018-03-24 NOTE — PLAN OF CARE
Problem: Patient Care Overview  Goal: Plan of Care Review  Outcome: Outcome(s) Achieved Date Met: 03/24/18 03/24/18 1252   Coping/Psychosocial   Plan Of Care Reviewed With patient   Plan of Care Review   Progress improving       Problem: Surgery Nonspecified (Adult)  Goal: Signs and Symptoms of Listed Potential Problems Will be Absent, Minimized or Managed (Surgery Nonspecified)  Outcome: Outcome(s) Achieved Date Met: 03/24/18

## 2018-03-24 NOTE — PLAN OF CARE
Problem: Patient Care Overview  Goal: Plan of Care Review  Outcome: Ongoing (interventions implemented as appropriate)   03/24/18 0502   Coping/Psychosocial   Plan Of Care Reviewed With patient   Plan of Care Review   Progress improving   Outcome Summary Pt denies pain or N/V, tolerating clears, ambulating in hallway independently       Problem: Surgery Nonspecified (Adult)  Goal: Signs and Symptoms of Listed Potential Problems Will be Absent, Minimized or Managed (Surgery Nonspecified)  Outcome: Ongoing (interventions implemented as appropriate)

## 2018-03-24 NOTE — PROGRESS NOTES
General Surgery Daily Progress Note    Subjective     Interval History:   No acute events overnight. Patient continued flatus and BMs. No N/V, no F/C, no CP/SOB. Trini clears yesterday. Lots of walking      Objective     Vital signs in last 24 hours:  Temp:  [36.6 °C (97.8 °F)-37 °C (98.6 °F)] 36.9 °C (98.4 °F)  Heart Rate:  [60-63] 60  Resp:  [16] 16  BP: (123-146)/(58-65) 146/65      Intake/Output Summary (Last 24 hours) at 03/24/18 0624  Last data filed at 03/24/18 0032   Gross per 24 hour   Intake             1336 ml   Output              375 ml   Net              961 ml     Intake/Output this shift:  I/O this shift:  In: -   Out: 300 [Urine:300]    Physical Exam    General appearance: alert, appears stated age and cooperative  Lungs: clear to auscultation bilaterally  Heart: regular rate and rhythm, S1, S2 normal, no murmur, click, rub or gallop  Abdomen: Soft, appropriately tender, mildly distended, unchanged. R inguinal Inc c/d/i  Extremities: extremities normal, warm and well-perfused; no cyanosis, clubbing, or edema      VTE Assessment: I have reassessed and the patient's VTE risk and treatment plan is appropriate.    Labs  CBC Results       03/20/18 03/19/18 03/18/18                    0549 0528 1752         WBC 11.90 (H) 10.75 (H) 8.85         RBC 4.19 4.07 4.20         HGB 13.9 13.5 13.9         HCT 39.0 37.3 37.9         MCV 93.1 91.6 90.2         MCH 33.2 33.2 33.1         MCHC 35.6 (H) 36.2 (H) 36.7 (H)          194 205         Comment for PLT at 1752 on 03/18/18:  PLT CLUMPING SUSPECTED. PLT COUNT MAY BE SLIGHTLY HIGHER THAN REPORTED. IF CLINICALLY WARRENTED, SUGGEST COLLECTIN NA CITRATE TUBE (BLUE TOP) IN ADDITION TO EDTA TUBE FOR FOLLOW UP CBC TESTING        BMP Results       03/20/18 03/19/18 03/18/18                    0549 0528 1752          138 133 (L)         K 3.5 (L) 3.9 3.7         Cl 103 108 96 (L)         CO2 27 24 25         Glucose 125 (H) 146 (H) 140 (H)         BUN 12 9 12          Creatinine 0.6 0.6 0.5 (L)         Calcium 9.2 8.9 9.5         Anion Gap 6 6 12         EGFR &gt;60.0 &gt;60.0 &gt;60.0         Comment for K at 1752 on 03/18/18:    Results obtained on plasma. Plasma Potassium values may be up to 0.4 mEQ/L less than serum values. The differences may be greater for patients with high platelet or white cell counts.          Imaging  Not applicable      Assessment/Plan     72 F POD5 s/p incarcerated R. Femoral hernia repair, Improved. +flatus/BM    - Full liquids today  - Pain control  - Encourage ambulation and IS  - Pain control and anti-emetics  - Monitor VS and UO  - dispo: possible d/c later today if tolerates fulls    Ramon Snyder MD

## 2018-03-26 ENCOUNTER — PATIENT OUTREACH (OUTPATIENT)
Dept: CASE MANAGEMENT | Facility: CLINIC | Age: 73
End: 2018-03-26

## 2018-03-26 ENCOUNTER — TELEPHONE (OUTPATIENT)
Dept: SURGERY | Facility: CLINIC | Age: 73
End: 2018-03-26

## 2018-03-26 RX ORDER — ACETAMINOPHEN 500 MG
500 TABLET ORAL EVERY 6 HOURS PRN
COMMUNITY

## 2018-03-26 RX ORDER — ACETAMINOPHEN 325 MG/1
TABLET ORAL EVERY 6 HOURS PRN
COMMUNITY
End: 2018-03-26 | Stop reason: DRUGHIGH

## 2018-03-26 NOTE — PROGRESS NOTES
NAME: Katheryn Goncalves    MRN: [id number]    YOB: 1945    EVENT DETAILS    Discharging Facility: [facility]  Date of Admission: 03/18/18  Date of Discharge: 03/24/18  Discharge Instructions Reviewed?: Yes         Reason for Admission: Incarcerated Right Femoral Hernia S/P Right Femoral Hernia Reduction & Repair.      HPI: Spoke with patient. She stated that she is doing very well post procedure. Her incision is healing without issues. Patient stated that she only needs ES Tylenol PRN for post op discomfort.     Patient denies fever, chills, weakness, dizziness, chest pain, increased shortness of breath, nausea, vomiting or diarrhea. Patient is conscious of the need to prevent constipation.     Patient is tolerating food and fluids. She is following a low residue, bland diet.       MEDICATION REVIEW:    Reported by:: Patient  Prescriptions Filled?: Yes     Was a medication discrepancy indentified?: No     Medication understanding?: Yes     Medication Adherence?: Yes     Any side effects from medication?: No       Medication review Reviewed, see medication history    Additional Comments: Patient stated that she is no longer taking Dilaudid      FOLLOW-UP TESTS/PROCEDURES: N/A    INTERVENTIONS / COORDINATION:    PCP Appt: Encouraged to schedule    Specialist Appt. : Dr. Green : 4/03/2018    Home Care: No skilled needs, not homebound      BARRIERS TO CARE    Self-Care   Living Arrangement: Children       Socioeconomic  Financial Problems?: No     Transportation Issues?: No            PLAN OF CARE:    Reviewed signs/symptoms of worsening condition or complication that necessitate a call to the Physician's office.  Educated patient on access to care.  RN phone number given for future care management needs.       Shauna Falk RN

## 2018-03-29 ENCOUNTER — TELEPHONE (OUTPATIENT)
Dept: SURGERY | Facility: CLINIC | Age: 73
End: 2018-03-29

## 2018-03-30 NOTE — TELEPHONE ENCOUNTER
Called pt, everything normal  Will keep an eye on the gas pain, will call if the pain because more

## 2018-04-03 ENCOUNTER — OFFICE VISIT (OUTPATIENT)
Dept: SURGERY | Facility: CLINIC | Age: 73
End: 2018-04-03
Payer: MEDICARE

## 2018-04-03 VITALS
WEIGHT: 125 LBS | DIASTOLIC BLOOD PRESSURE: 70 MMHG | HEART RATE: 63 BPM | SYSTOLIC BLOOD PRESSURE: 123 MMHG | BODY MASS INDEX: 20.8 KG/M2 | TEMPERATURE: 98.4 F

## 2018-04-03 DIAGNOSIS — K41.30 INCARCERATED FEMORAL HERNIA: Primary | ICD-10-CM

## 2018-04-03 PROCEDURE — 99024 POSTOP FOLLOW-UP VISIT: CPT | Performed by: SURGERY

## 2018-04-03 NOTE — ASSESSMENT & PLAN NOTE
The patient is about two weeks out from repair of an incarerated right femoral hernia in an open fashion.  She has done well.  The wound looks good.  She will return to her normal diet and activity level.  She may get a chronic hernia in this location and will return if that occurs.

## 2018-04-03 NOTE — PROGRESS NOTES
Chief Complaint:   Chief Complaint   Patient presents with   • Post-op     Repair of incaretated R femoral heria 3/19/18   .    HPI     Patient is a 72 y.o. female who presents with the following:  She feels OK two weeks out from her repair of incarcerated Right femoral hernia.  She needed miralax at home for the first week or so but now is now feeling more regular.  Feels like her diet is returning to normal as well.  No pain.  No swelling.       Medical History:   Past Medical History:   Diagnosis Date   • Marshall esophagus    • High cholesterol    • Hypothyroidism    • Osteoporosis        Surgical History:   Past Surgical History:   Procedure Laterality Date   • CHOLECYSTECTOMY     • FEMORAL HERNIA REPAIR Right     bowel dusky but OK - no resection   • HYSTERECTOMY         Social History:   Social History     Social History Narrative   • No narrative on file       Family History:   History reviewed. No pertinent family history.    Allergies:   Patient has no known allergies.    Current Medications:      Current Outpatient Prescriptions:   •  atorvastatin (LIPITOR) 20 mg tablet, Take 20 mg by mouth daily., Disp: , Rfl:   •  esomeprazole (NexIUM) 40 mg capsule, Take 40 mg by mouth daily., Disp: , Rfl:   •  LEVOTHYROXINE SODIUM (SYNTHROID ORAL), Take 100 mcg by mouth daily.  , Disp: , Rfl:   •  acetaminophen (TYLENOL) 500 mg tablet, Take 500 mg by mouth every 6 (six) hours as needed for mild pain (Takes PRN only)., Disp: , Rfl:     Review of Systems  All 14 other systems reviewed and negative except for those included in the history of present illness.    Objective     Vital Signs for the last 24 hours:  Temp:  [36.9 °C (98.4 °F)] 36.9 °C (98.4 °F)  Heart Rate:  [63] 63  BP: (123)/(70) 123/70    Physicial Exam    Postop abdomen exam:  Auscultation:  Bowel sounds were normal.  Palpation:  No abdominal guarding.  No abdominal rigidity.  No direct tenderness in the abdomen.  No rebound tenderness in the abdomen.   Tolentino's sign was not observed.  No mass was palpated in the abdomen.    The wounds were seen to be healing normally.  Specifics:  The wound has healed perfectly and I removed the staples.            Problem List Items Addressed This Visit     Incarcerated femoral hernia - Primary     The patient is about two weeks out from repair of an incarerated right femoral hernia in an open fashion.  She has done well.  The wound looks good.  She will return to her normal diet and activity level.  She may get a chronic hernia in this location and will return if that occurs.                     Yang Green MD 4/3/2018 8:33 AM

## 2018-04-03 NOTE — LETTER
April 3, 2018     Sugar Means MD  [address] [facility] [address]    Patient: Katheryn Goncalves   YOB: 1945   Date of Visit: 4/3/2018       Dear Dr. Eva Means:    Thank you for referring Katheryn Goncalves to me for evaluation. Below are my notes for this consultation.    If you have questions, please do not hesitate to call me. I look forward to following your patient along with you.         Sincerely,        Yang Green MD        CC: MD Yang Mittal MD  4/3/2018  8:34 AM  Sign at close encounter      Chief Complaint:   Chief Complaint   Patient presents with   • Post-op     Repair of incaretated R femoral heria 3/19/18   .    HPI     Patient is a 72 y.o. female who presents with the following:  She feels OK two weeks out from her repair of incarcerated Right femoral hernia.  She needed miralax at home for the first week or so but now is now feeling more regular.  Feels like her diet is returning to normal as well.  No pain.  No swelling.       Medical History:   Past Medical History:   Diagnosis Date   • Marshall esophagus    • High cholesterol    • Hypothyroidism    • Osteoporosis        Surgical History:   Past Surgical History:   Procedure Laterality Date   • CHOLECYSTECTOMY     • FEMORAL HERNIA REPAIR Right     bowel dusky but OK - no resection   • HYSTERECTOMY         Social History:   Social History     Social History Narrative   • No narrative on file       Family History:   History reviewed. No pertinent family history.    Allergies:   Patient has no known allergies.    Current Medications:      Current Outpatient Prescriptions:   •  atorvastatin (LIPITOR) 20 mg tablet, Take 20 mg by mouth daily., Disp: , Rfl:   •  esomeprazole (NexIUM) 40 mg capsule, Take 40 mg by mouth daily., Disp: , Rfl:   •  LEVOTHYROXINE SODIUM (SYNTHROID ORAL), Take 100 mcg by mouth daily.  , Disp: , Rfl:   •  acetaminophen (TYLENOL) 500 mg tablet, Take  500 mg by mouth every 6 (six) hours as needed for mild pain (Takes PRN only)., Disp: , Rfl:     Review of Systems  All 14 other systems reviewed and negative except for those included in the history of present illness.    Objective     Vital Signs for the last 24 hours:  Temp:  [36.9 °C (98.4 °F)] 36.9 °C (98.4 °F)  Heart Rate:  [63] 63  BP: (123)/(70) 123/70    Physicial Exam    Postop abdomen exam:  Auscultation:  Bowel sounds were normal.  Palpation:  No abdominal guarding.  No abdominal rigidity.  No direct tenderness in the abdomen.  No rebound tenderness in the abdomen.  Tolentino's sign was not observed.  No mass was palpated in the abdomen.    The wounds were seen to be healing normally.  Specifics:  The wound has healed perfectly and I removed the staples.            Problem List Items Addressed This Visit     Incarcerated femoral hernia - Primary     The patient is about two weeks out from repair of an incarerated right femoral hernia in an open fashion.  She has done well.  The wound looks good.  She will return to her normal diet and activity level.  She may get a chronic hernia in this location and will return if that occurs.                     Yang Green MD 4/3/2018 8:33 AM

## 2018-04-23 ENCOUNTER — TELEPHONE (OUTPATIENT)
Dept: INTERNAL MEDICINE | Facility: CLINIC | Age: 73
End: 2018-04-23

## 2018-04-23 NOTE — TELEPHONE ENCOUNTER
Should use a daily fiber supplement such as metamucil or konsyl one packet in 8 ounces of water or juice and add a stool softner colace 100 mg once daily

## 2018-05-16 ENCOUNTER — TELEPHONE (OUTPATIENT)
Dept: INTERNAL MEDICINE | Facility: CLINIC | Age: 73
End: 2018-05-16

## 2018-05-16 DIAGNOSIS — Z12.31 SCREENING MAMMOGRAM, ENCOUNTER FOR: Primary | ICD-10-CM

## 2018-05-16 NOTE — TELEPHONE ENCOUNTER
Patient calling to get a mammogram script.  She has been taking colace and metamucil and wants to know how long she could take them.

## 2018-06-28 ENCOUNTER — HOSPITAL ENCOUNTER (OUTPATIENT)
Dept: RADIOLOGY | Age: 73
Discharge: HOME | End: 2018-06-28
Attending: INTERNAL MEDICINE
Payer: MEDICARE

## 2018-06-28 DIAGNOSIS — Z12.31 SCREENING MAMMOGRAM, ENCOUNTER FOR: ICD-10-CM

## 2018-06-28 PROCEDURE — 77067 SCR MAMMO BI INCL CAD: CPT

## 2018-07-03 ENCOUNTER — OFFICE VISIT (OUTPATIENT)
Dept: SURGERY | Facility: CLINIC | Age: 73
End: 2018-07-03
Payer: MEDICARE

## 2018-07-03 VITALS
OXYGEN SATURATION: 98 % | HEART RATE: 48 BPM | WEIGHT: 125 LBS | TEMPERATURE: 97.2 F | BODY MASS INDEX: 20.83 KG/M2 | HEIGHT: 65 IN | SYSTOLIC BLOOD PRESSURE: 110 MMHG | DIASTOLIC BLOOD PRESSURE: 72 MMHG

## 2018-07-03 DIAGNOSIS — K41.30 INCARCERATED FEMORAL HERNIA: Primary | ICD-10-CM

## 2018-07-03 PROCEDURE — 99213 OFFICE O/P EST LOW 20 MIN: CPT | Performed by: SURGERY

## 2018-07-03 NOTE — ASSESSMENT & PLAN NOTE
Her R inguinal scar from 4 months ago has a noticeable bump that the patient notices.  On exam this is normal scarring.  She is relieved and will see me PRN.

## 2018-07-03 NOTE — LETTER
July 3, 2018     Sugar Means MD  [address] [facility] [address]    Patient: aKtheryn Goncalves   YOB: 1945   Date of Visit: 7/3/2018       Dear Dr. Eva Means:    Thank you for referring Katheryn Goncalves to me for evaluation. Below are my notes for this consultation.    If you have questions, please do not hesitate to call me. I look forward to following your patient along with you.         Sincerely,        Yang Green MD        CC: No Recipients  Yang Green MD  7/3/2018 10:50 AM  Sign at close encounter      Chief Complaint:   Chief Complaint   Patient presents with   • Follow-up     Hernia surgery - feels a bulge where surgery was   .    HPI     Patient is a 72 y.o. female who presents with the following:  Pt had urgent repair of R femoral hernia in 3/18 - area is a little sore - feels  A little lump there - lump does not swell.  No F/C.  Pt also has uterine prolapse with Dr. Roland.  .     Medical History:   Past Medical History:   Diagnosis Date   • Marshall esophagus    • High cholesterol    • Hypothyroidism    • Osteoporosis        Surgical History:   Past Surgical History:   Procedure Laterality Date   • CHOLECYSTECTOMY     • FEMORAL HERNIA REPAIR Right     bowel dusky but OK - no resection   • HYSTERECTOMY         Social History:   Social History     Social History   • Marital status:      Spouse name: N/A   • Number of children: N/A   • Years of education: N/A     Occupational History   • Not on file.     Social History Main Topics   • Smoking status: Never Smoker   • Smokeless tobacco: Never Used   • Alcohol use No   • Drug use: No   • Sexual activity: Not on file     Other Topics Concern   • Not on file     Social History Narrative   • No narrative on file       Family History:   History reviewed. No pertinent family history.    Allergies:   Patient has no known allergies.    Current Medications:      Current Outpatient Prescriptions:    •  acetaminophen (TYLENOL) 500 mg tablet, Take 500 mg by mouth every 6 (six) hours as needed for mild pain (Takes PRN only)., Disp: , Rfl:   •  atorvastatin (LIPITOR) 20 mg tablet, Take 20 mg by mouth daily., Disp: , Rfl:   •  esomeprazole (NexIUM) 40 mg capsule, Take 40 mg by mouth daily., Disp: , Rfl:   •  LEVOTHYROXINE SODIUM (SYNTHROID ORAL), Take 100 mcg by mouth daily.  , Disp: , Rfl:     Review of Systems  All 14 systems reviewed and the findings are not pertinent to the current problem.    Objective     Vital Signs  Vitals:    07/03/18 1035   BP: 110/72   Pulse: (!) 48   Temp: 36.2 °C (97.2 °F)   SpO2: 98%     Body mass index is 20.8 kg/m².      Physicial Exam    General Appearance:    Well developed.  Well nourished.  In no acute distress.  Patient was not observed to be obese.  Head:  Posture of the head was normal.  Neck:  External appearance of the neck was normal.  Trachea:  Showed no abnormalities.  Trachea was midline.  Eyes:  Extraocular movements were normal.  Pupils:  Reactive to light.  Not deformed.  Oral Cavity:  Mixed dentition was not observed.  Tongue was midline.    Abdomen:  Abdomen demonstrated no visible epigastric pulsation.  Abdomen had no fistula.  Inspection of the abdomen did not reveal [place]'s sign.  Inspection of the abdomen did not demonstrate Unger Lou's sign.  Auscultation:  Bowel sounds were normal.  Palpation:  No abdominal guarding.  No abdominal rigidity.  No direct tenderness in the abdomen.  No rebound tenderness in the abdomen.  Tolentino's sign was not observed.  No mass was palpated in the abdomen.  Gallbladder:  Not palpable.  Scars:  R inguinal scar is normal.  No swelling.  No induration.  No fluid.      Other:  Musculoskeletal System:  No deficits or defects in the upper or lower extremities.    Functional Exam:   General/bilateral: Mobility was not limited.  Neurological:  No confusion was observed.  No disorientation was observed.  Speech: Normal.  Motor: No  tremor was seen.  Balance: Normal.  Gait And Stance: Normal.  Psychiatric:  Mood:  Euthymic.  Affect:  Normal.  Skin:  General appearance was normal.  No jaundice.  Nails:  No clubbing of the fingernails.      Problem List Items Addressed This Visit     Incarcerated femoral hernia - Primary     Her R inguinal scar from 4 months ago has a noticeable bump that the patient notices.  On exam this is normal scarring.  She is relieved and will see me PRN.                     Yang Green MD 7/3/2018 10:50 AM

## 2018-07-03 NOTE — PROGRESS NOTES
Chief Complaint:   Chief Complaint   Patient presents with   • Follow-up     Hernia surgery - feels a bulge where surgery was   .    HPI     Patient is a 72 y.o. female who presents with the following:  Pt had urgent repair of R femoral hernia in 3/18 - area is a little sore - feels  A little lump there - lump does not swell.  No F/C.  Pt also has uterine prolapse with Dr. Roland.  .     Medical History:   Past Medical History:   Diagnosis Date   • Marshall esophagus    • High cholesterol    • Hypothyroidism    • Osteoporosis        Surgical History:   Past Surgical History:   Procedure Laterality Date   • CHOLECYSTECTOMY     • FEMORAL HERNIA REPAIR Right     bowel dusky but OK - no resection   • HYSTERECTOMY         Social History:   Social History     Social History   • Marital status:      Spouse name: N/A   • Number of children: N/A   • Years of education: N/A     Occupational History   • Not on file.     Social History Main Topics   • Smoking status: Never Smoker   • Smokeless tobacco: Never Used   • Alcohol use No   • Drug use: No   • Sexual activity: Not on file     Other Topics Concern   • Not on file     Social History Narrative   • No narrative on file       Family History:   History reviewed. No pertinent family history.    Allergies:   Patient has no known allergies.    Current Medications:      Current Outpatient Prescriptions:   •  acetaminophen (TYLENOL) 500 mg tablet, Take 500 mg by mouth every 6 (six) hours as needed for mild pain (Takes PRN only)., Disp: , Rfl:   •  atorvastatin (LIPITOR) 20 mg tablet, Take 20 mg by mouth daily., Disp: , Rfl:   •  esomeprazole (NexIUM) 40 mg capsule, Take 40 mg by mouth daily., Disp: , Rfl:   •  LEVOTHYROXINE SODIUM (SYNTHROID ORAL), Take 100 mcg by mouth daily.  , Disp: , Rfl:     Review of Systems  All 14 systems reviewed and the findings are not pertinent to the current problem.    Objective     Vital Signs  Vitals:    07/03/18 1035   BP: 110/72    Pulse: (!) 48   Temp: 36.2 °C (97.2 °F)   SpO2: 98%     Body mass index is 20.8 kg/m².      Physicial Exam    General Appearance:    Well developed.  Well nourished.  In no acute distress.  Patient was not observed to be obese.  Head:  Posture of the head was normal.  Neck:  External appearance of the neck was normal.  Trachea:  Showed no abnormalities.  Trachea was midline.  Eyes:  Extraocular movements were normal.  Pupils:  Reactive to light.  Not deformed.  Oral Cavity:  Mixed dentition was not observed.  Tongue was midline.    Abdomen:  Abdomen demonstrated no visible epigastric pulsation.  Abdomen had no fistula.  Inspection of the abdomen did not reveal Elizabeth's sign.  Inspection of the abdomen did not demonstrate Unger Lou's sign.  Auscultation:  Bowel sounds were normal.  Palpation:  No abdominal guarding.  No abdominal rigidity.  No direct tenderness in the abdomen.  No rebound tenderness in the abdomen.  Tolentino's sign was not observed.  No mass was palpated in the abdomen.  Gallbladder:  Not palpable.  Scars:  R inguinal scar is normal.  No swelling.  No induration.  No fluid.      Other:  Musculoskeletal System:  No deficits or defects in the upper or lower extremities.    Functional Exam:   General/bilateral: Mobility was not limited.  Neurological:  No confusion was observed.  No disorientation was observed.  Speech: Normal.  Motor: No tremor was seen.  Balance: Normal.  Gait And Stance: Normal.  Psychiatric:  Mood:  Euthymic.  Affect:  Normal.  Skin:  General appearance was normal.  No jaundice.  Nails:  No clubbing of the fingernails.      Problem List Items Addressed This Visit     Incarcerated femoral hernia - Primary     Her R inguinal scar from 4 months ago has a noticeable bump that the patient notices.  On exam this is normal scarring.  She is relieved and will see me PRN.                     Yang Green MD 7/3/2018 10:50 AM

## 2018-08-01 ENCOUNTER — OFFICE VISIT (OUTPATIENT)
Dept: UROGYNECOLOGY | Facility: CLINIC | Age: 73
End: 2018-08-01
Payer: MEDICARE

## 2018-08-01 VITALS
WEIGHT: 125 LBS | BODY MASS INDEX: 20.83 KG/M2 | SYSTOLIC BLOOD PRESSURE: 118 MMHG | DIASTOLIC BLOOD PRESSURE: 68 MMHG | HEIGHT: 65 IN

## 2018-08-01 DIAGNOSIS — N99.3 VAGINAL VAULT PROLAPSE, POSTHYSTERECTOMY: Primary | ICD-10-CM

## 2018-08-01 PROBLEM — K41.30 INCARCERATED FEMORAL HERNIA: Status: RESOLVED | Noted: 2018-03-18 | Resolved: 2018-08-01

## 2018-08-01 PROCEDURE — 99204 OFFICE O/P NEW MOD 45 MIN: CPT | Mod: 25 | Performed by: OBSTETRICS & GYNECOLOGY

## 2018-08-01 PROCEDURE — 51701 INSERT BLADDER CATHETER: CPT | Performed by: OBSTETRICS & GYNECOLOGY

## 2018-08-01 ASSESSMENT — ENCOUNTER SYMPTOMS
COUGH: 0
WHEEZING: 0
SHORTNESS OF BREATH: 0
DIFFICULTY URINATING: 0
SEIZURES: 0
FEVER: 0
HALLUCINATIONS: 0
NAUSEA: 0
NECK STIFFNESS: 0
FACIAL ASYMMETRY: 0
DIAPHORESIS: 0
BREAST PAIN: 0
PALPITATIONS: 0
JOINT SWELLING: 0
DIARRHEA: 0
FLANK PAIN: 0
NECK PAIN: 0
FATIGUE: 0
ADENOPATHY: 0
BREAST MASS: 0
APPETITE CHANGE: 0
CHILLS: 0
LIGHT-HEADEDNESS: 0
BRUISES/BLEEDS EASILY: 0
POLYDIPSIA: 0
ABDOMINAL PAIN: 0
AGITATION: 0
CONSTIPATION: 0

## 2018-08-01 NOTE — PROGRESS NOTES
"Visit Date: 2018   Subjective   Katheryn Goncalves is 72 y.o. female who is referred by Dr. Waterman for evaluation of Prolapse    Katheryn FORRESTER is a 72 y.o. female presenting with c/o vaginal bulge. First noticed 5 years ago. She is 20 years s/p TVH for prolapse. Bulge is present constantly. Worse with standing, heavy lifting and bowel movements. She is using fiber and colace and her constipation has improved. She feels her bladder empties well. Reports prolapse reduction for comfort and repositioning for urination. Denies digital splinting. She is not sexually active. She tried a ring pessary but it was uncomfortable, no leakage with pessary in place. Denies DONTA, UUI, UTI.    She has had prolapse for more than five years, but it has worsened in the last year. Her gyn feels that this needs to be addressed surgically. A pessary was not effective.  She does have some difficulty initiating urination. She does watch her grandkids and lifts boxes.     Objective   /68   Ht 1.651 m (5' 5\")   Wt 56.7 kg (125 lb)   BMI 20.80 kg/m²   Medications:   Current Outpatient Prescriptions:   •  acetaminophen (TYLENOL) 500 mg tablet, Take 500 mg by mouth every 6 (six) hours as needed for mild pain (Takes PRN only)., Disp: , Rfl:   •  atorvastatin (LIPITOR) 20 mg tablet, Take 20 mg by mouth daily., Disp: , Rfl:   •  esomeprazole (NexIUM) 40 mg capsule, Take 40 mg by mouth daily., Disp: , Rfl:   •  LEVOTHYROXINE SODIUM (SYNTHROID ORAL), Take 100 mcg by mouth daily.  , Disp: , Rfl:     Allergies: has No Known Allergies.     OB History      Para Term  AB Living    7 6            SAB TAB Ectopic Multiple Live Births                     Past Medical History:  has a past medical history of Marshall esophagus; High cholesterol; Hypothyroidism; and Osteoporosis.  Past Surgical History:  has a past surgical history that includes Cholecystectomy; Femoral hernia repair (Right); and Total vaginal hysterectomy ().  Family " History: family history includes Colon cancer in her mother; Heart disease in her father.  Social History:  reports that she has never smoked. She has never used smokeless tobacco. She reports that she does not drink alcohol or use drugs.    Review of Systems   Constitutional: Negative for appetite change, chills, diaphoresis, fatigue and fever.   Respiratory: Negative for cough, shortness of breath and wheezing.    Cardiovascular: Negative for chest pain and palpitations.   Gastrointestinal: Negative for abdominal pain, constipation, diarrhea and nausea.   Endocrine: Negative for polydipsia.   Genitourinary: Negative for decreased urine volume, difficulty urinating, flank pain, nocturia, vaginal discharge, vaginal itching and vaginal pain.        See HPI   Breast: Negative for breast discharge, breast mass and breast pain.   Musculoskeletal: Negative for joint swelling, neck pain and neck stiffness.   Skin: Negative for pallor and rash.   Neurological: Negative for seizures, facial asymmetry and light-headedness.   Hematological: Negative for adenopathy. Does not bruise/bleed easily.   Psychiatric/Behavioral: Negative for agitation and hallucinations.     Physical Exam   Constitutional: She is oriented to person, place, and time. She appears well-developed and well-nourished.   Genitourinary: Rectum normal. Pelvic exam was performed with patient in lithotomy exam position.     External female genitalia normal.   Urethral meatus normal.   Urethra normal. There is no urethral urethrocele or urethral diverticulum. No stress urinary incontinence.  Normal bladder. The vagina is not narrow. There is a vaginal vault prolapse and cystocele present.  There is no enterocele present. The cervix is absent.   Uterus is absent.   Right adnexum non-palpable.   Left adnexum non-palpable. Rectal exam normal.     POP-Q measurements were:   Aa: 1, Ba: 1, C: -3  gh: 3, pb: 2, TVL: 9  Ap: -2, Bp: -2, D: X  Neck: Normal range of motion.  Neck supple. No thyromegaly present.   Cardiovascular: Normal rate, regular rhythm and normal heart sounds.    Pulmonary/Chest: Effort normal and breath sounds normal.   Abdominal: Soft. Normal appearance. There is no hepatosplenomegaly. There is no tenderness. No hernia.   Lymphadenopathy:     She has no cervical adenopathy.     She has no axillary adenopathy.        Right: No inguinal adenopathy present.        Left: No inguinal adenopathy present.   Neurological: She is oriented to person, place, and time.   Skin: Skin is warm, dry and intact.   Psychiatric: She has a normal mood and affect. Her behavior is normal. Judgment and thought content normal.        Assessment/Plan   PLAN  Vaginal vault prolapse, posthysterectomy  Stage 2A prolapse without enterocele  This is recurrent prolapse having had a vaginal hysterectomy  She did not like a pessary    We discussed surgical approaches to prolapse, including vaginal and abdominal approaches, the role of  hysterectomy was reviewed. The role of mesh augmentation was discussed including the current controversies with vaginal mesh kits.  I have discussed with her my experience with each of these procedures over the past 20 years, including complications and recurrence of prolapse.  Printed literature was provided.    Return in about 5 weeks (around 9/5/2018) for urodynamics.

## 2018-08-01 NOTE — LETTER
"August 1, 2018     Anyi Waterman MD  [address]  [place]  [facility] [address]    Patient: Katheryn Goncalves   YOB: 1945   Date of Visit: 8/1/2018       Dear Dr. Waterman: ([place])    Thank you for referring Katheryn Goncalves to me for evaluation. Below are my notes for this consultation.  She has recurrent prolapse, having had a vaginal hysterectomy in the distant past.  She is very active. She has worsening bulge symptoms and is not happy with a pessary. She has a large anterior vaginal wall prolapse. We discussed both an abdominal sacral colpopexy vs. SSLS, and I think that she would prefer the former.      If you have questions, please do not hesitate to call me. I look forward to following your patient along with you.         Sincerely,        Rl Roland MD        CC: No Recipients  Rl Roland MD  8/1/2018  2:06 PM  Sign at close encounter  Visit Date: 8/1/2018   Subjective   Katheryn Goncalves is 72 y.o. female who is referred by Dr. Waterman for evaluation of Prolapse    Katheryn FORRESTER is a 72 y.o. female presenting with c/o vaginal bulge. First noticed 5 years ago. She is 20 years s/p TVH for prolapse. Bulge is present constantly. Worse with standing, heavy lifting and bowel movements. She is using fiber and colace and her constipation has improved. She feels her bladder empties well. Reports prolapse reduction for comfort and repositioning for urination. Denies digital splinting. She is not sexually active. She tried a ring pessary but it was uncomfortable, no leakage with pessary in place. Denies DONTA, UUI, UTI.    She has had prolapse for more than five years, but it has worsened in the last year. Her gyn feels that this needs to be addressed surgically. A pessary was not effective.  She does have some difficulty initiating urination. She does watch her grandkids and lifts boxes.     Objective   /68   Ht 1.651 m (5' 5\")   Wt 56.7 kg (125 lb)   BMI 20.80 kg/m²  "   Medications:   Current Outpatient Prescriptions:   •  acetaminophen (TYLENOL) 500 mg tablet, Take 500 mg by mouth every 6 (six) hours as needed for mild pain (Takes PRN only)., Disp: , Rfl:   •  atorvastatin (LIPITOR) 20 mg tablet, Take 20 mg by mouth daily., Disp: , Rfl:   •  esomeprazole (NexIUM) 40 mg capsule, Take 40 mg by mouth daily., Disp: , Rfl:   •  LEVOTHYROXINE SODIUM (SYNTHROID ORAL), Take 100 mcg by mouth daily.  , Disp: , Rfl:     Allergies: has No Known Allergies.     OB History      Para Term  AB Living    7 6            SAB TAB Ectopic Multiple Live Births                     Past Medical History:  has a past medical history of Marshall esophagus; High cholesterol; Hypothyroidism; and Osteoporosis.  Past Surgical History:  has a past surgical history that includes Cholecystectomy; Femoral hernia repair (Right); and Total vaginal hysterectomy ().  Family History: family history includes Colon cancer in her mother; Heart disease in her father.  Social History:  reports that she has never smoked. She has never used smokeless tobacco. She reports that she does not drink alcohol or use drugs.    Review of Systems   Constitutional: Negative for appetite change, chills, diaphoresis, fatigue and fever.   Respiratory: Negative for cough, shortness of breath and wheezing.    Cardiovascular: Negative for chest pain and palpitations.   Gastrointestinal: Negative for abdominal pain, constipation, diarrhea and nausea.   Endocrine: Negative for polydipsia.   Genitourinary: Negative for decreased urine volume, difficulty urinating, flank pain, nocturia, vaginal discharge, vaginal itching and vaginal pain.        See HPI   Breast: Negative for breast discharge, breast mass and breast pain.   Musculoskeletal: Negative for joint swelling, neck pain and neck stiffness.   Skin: Negative for pallor and rash.   Neurological: Negative for seizures, facial asymmetry and light-headedness.   Hematological:  Negative for adenopathy. Does not bruise/bleed easily.   Psychiatric/Behavioral: Negative for agitation and hallucinations.     Physical Exam   Constitutional: She is oriented to person, place, and time. She appears well-developed and well-nourished.   Genitourinary: Rectum normal. Pelvic exam was performed with patient in lithotomy exam position.     External female genitalia normal.   Urethral meatus normal.   Urethra normal. There is no urethral urethrocele or urethral diverticulum. No stress urinary incontinence.  Normal bladder. The vagina is not narrow. There is a vaginal vault prolapse and cystocele present.  There is no enterocele present. The cervix is absent.   Uterus is absent.   Right adnexum non-palpable.   Left adnexum non-palpable. Rectal exam normal.     POP-Q measurements were:   Aa: 1, Ba: 1, C: -3  gh: 3, pb: 2, TVL: 9  Ap: -2, Bp: -2, D: X  Neck: Normal range of motion. Neck supple. No thyromegaly present.   Cardiovascular: Normal rate, regular rhythm and normal heart sounds.    Pulmonary/Chest: Effort normal and breath sounds normal.   Abdominal: Soft. Normal appearance. There is no hepatosplenomegaly. There is no tenderness. No hernia.   Lymphadenopathy:     She has no cervical adenopathy.     She has no axillary adenopathy.        Right: No inguinal adenopathy present.        Left: No inguinal adenopathy present.   Neurological: She is oriented to person, place, and time.   Skin: Skin is warm, dry and intact.   Psychiatric: She has a normal mood and affect. Her behavior is normal. Judgment and thought content normal.        Assessment/Plan   PLAN  Vaginal vault prolapse, posthysterectomy  Stage 2A prolapse without enterocele  This is recurrent prolapse having had a vaginal hysterectomy  She did not like a pessary    We discussed surgical approaches to prolapse, including vaginal and abdominal approaches, the role of  hysterectomy was reviewed. The role of mesh augmentation was discussed  including the current controversies with vaginal mesh kits.  I have discussed with her my experience with each of these procedures over the past 20 years, including complications and recurrence of prolapse.  Printed literature was provided.    Return in about 5 weeks (around 9/5/2018) for urodynamics.        Rl Roland MD  8/1/2018  1:49 PM  Unsigned  Procedures  Procedure Note:  (67002) The urethra was prepped, and a lubricated 12 Malay catheter was inserted to collect a post void residual.  The procedure was well tolerated by the patient  The PVR amount is recorded (50 ml)

## 2018-08-01 NOTE — ASSESSMENT & PLAN NOTE
Stage 2A prolapse without enterocele  This is recurrent prolapse having had a vaginal hysterectomy  She did not like a pessary    We discussed surgical approaches to prolapse, including vaginal and abdominal approaches, the role of  hysterectomy was reviewed. The role of mesh augmentation was discussed including the current controversies with vaginal mesh kits.  I have discussed with her my experience with each of these procedures over the past 20 years, including complications and recurrence of prolapse.  Printed literature was provided.    She is thin and healthy and would be best served with sacral colpopexy  Her vagina is slightly shortened and she is very active  She is not sexually active

## 2018-08-01 NOTE — PROCEDURES
Procedures  Procedure Note:  (70252) The urethra was prepped, and a lubricated 12 Cypriot catheter was inserted to collect a post void residual.  The procedure was well tolerated by the patient  The PVR amount is recorded (50 ml)

## 2018-10-10 ENCOUNTER — CLINICAL SUPPORT (OUTPATIENT)
Dept: INTERNAL MEDICINE | Facility: CLINIC | Age: 73
End: 2018-10-10
Payer: MEDICARE

## 2018-10-10 DIAGNOSIS — Z23 FLU VACCINE NEED: Primary | ICD-10-CM

## 2018-10-10 PROCEDURE — G0008 ADMIN INFLUENZA VIRUS VAC: HCPCS | Performed by: INTERNAL MEDICINE

## 2018-10-10 PROCEDURE — 90653 IIV ADJUVANT VACCINE IM: CPT | Performed by: INTERNAL MEDICINE

## 2018-11-27 ENCOUNTER — TRANSCRIBE ORDERS (OUTPATIENT)
Dept: RADIOLOGY | Facility: HOSPITAL | Age: 73
End: 2018-11-27

## 2018-11-27 ENCOUNTER — HOSPITAL ENCOUNTER (OUTPATIENT)
Dept: RADIOLOGY | Facility: HOSPITAL | Age: 73
Discharge: HOME | End: 2018-11-27
Attending: INTERNAL MEDICINE
Payer: MEDICARE

## 2018-11-27 DIAGNOSIS — M54.6 PAIN IN THORACIC SPINE: ICD-10-CM

## 2018-11-27 DIAGNOSIS — M81.0 AGE-RELATED OSTEOPOROSIS WITHOUT CURRENT PATHOLOGICAL FRACTURE: Primary | ICD-10-CM

## 2018-11-27 DIAGNOSIS — M81.0 AGE-RELATED OSTEOPOROSIS WITHOUT CURRENT PATHOLOGICAL FRACTURE: ICD-10-CM

## 2018-11-27 PROCEDURE — 72070 X-RAY EXAM THORAC SPINE 2VWS: CPT

## 2018-11-28 ENCOUNTER — TRANSCRIBE ORDERS (OUTPATIENT)
Dept: SCHEDULING | Age: 73
End: 2018-11-28

## 2018-11-28 DIAGNOSIS — M81.0 AGE-RELATED OSTEOPOROSIS WITHOUT CURRENT PATHOLOGICAL FRACTURE: Primary | ICD-10-CM

## 2019-01-02 ENCOUNTER — HOSPITAL ENCOUNTER (OUTPATIENT)
Dept: RADIOLOGY | Age: 74
Discharge: HOME | End: 2019-01-02
Attending: INTERNAL MEDICINE
Payer: MEDICARE

## 2019-01-02 ENCOUNTER — TRANSCRIBE ORDERS (OUTPATIENT)
Dept: RADIOLOGY | Age: 74
End: 2019-01-02

## 2019-01-02 DIAGNOSIS — M81.0 AGE-RELATED OSTEOPOROSIS WITHOUT CURRENT PATHOLOGICAL FRACTURE: ICD-10-CM

## 2019-01-02 PROCEDURE — 77080 DXA BONE DENSITY AXIAL: CPT

## 2019-01-22 ENCOUNTER — TELEPHONE (OUTPATIENT)
Dept: INTERNAL MEDICINE | Facility: CLINIC | Age: 74
End: 2019-01-22

## 2019-01-22 NOTE — TELEPHONE ENCOUNTER
Pt requesting you look at a office note from 8/1/18 with dr. albrecht and call her to discuss  [phone]

## 2019-01-31 NOTE — TELEPHONE ENCOUNTER
Spoke with Ms. Goncalves.  Can you please print out and mail to her DrAldo totally is note she would like to show it to her brother who is a pathologist.  Also she says Dr. santamaria sent a another note to Dr. Waterman.  Can you please ask Dr. Waterman's office to fax me that note thank you

## 2019-02-05 ENCOUNTER — TELEPHONE (OUTPATIENT)
Dept: INTERNAL MEDICINE | Facility: CLINIC | Age: 74
End: 2019-02-05

## 2019-02-05 DIAGNOSIS — E03.9 HYPOTHYROIDISM, UNSPECIFIED TYPE: Primary | ICD-10-CM

## 2019-02-13 ENCOUNTER — APPOINTMENT (OUTPATIENT)
Dept: LAB | Facility: HOSPITAL | Age: 74
End: 2019-02-13
Attending: INTERNAL MEDICINE
Payer: MEDICARE

## 2019-02-13 DIAGNOSIS — E03.9 HYPOTHYROIDISM, UNSPECIFIED TYPE: ICD-10-CM

## 2019-02-13 LAB — TSH SERPL DL<=0.05 MIU/L-ACNC: 0.31 MIU/L (ref 0.34–5.6)

## 2019-02-13 PROCEDURE — 36415 COLL VENOUS BLD VENIPUNCTURE: CPT

## 2019-02-13 PROCEDURE — 84443 ASSAY THYROID STIM HORMONE: CPT

## 2019-02-15 ENCOUNTER — TELEPHONE (OUTPATIENT)
Dept: INTERNAL MEDICINE | Facility: CLINIC | Age: 74
End: 2019-02-15

## 2019-02-15 DIAGNOSIS — E03.9 HYPOTHYROIDISM, UNSPECIFIED TYPE: Primary | ICD-10-CM

## 2019-02-15 RX ORDER — LEVOTHYROXINE SODIUM 88 UG/1
88 TABLET ORAL
Qty: 90 TABLET | Refills: 3 | Status: SHIPPED | OUTPATIENT
Start: 2019-02-15 | End: 2020-01-28

## 2019-02-15 NOTE — TELEPHONE ENCOUNTER
Please let Ms Goncalves know that her tSH is too low - she needs to decrease her synthroid to 88mcg daily - I will send in a new prescription for this and she can repeat her TSH in 8 weeks - order is in

## 2019-02-25 RX ORDER — ATORVASTATIN CALCIUM 20 MG/1
TABLET, FILM COATED ORAL
Qty: 30 TABLET | Refills: 1 | Status: SHIPPED | OUTPATIENT
Start: 2019-02-25 | End: 2019-05-06 | Stop reason: SDUPTHER

## 2019-04-02 ENCOUNTER — TELEPHONE (OUTPATIENT)
Dept: INTERNAL MEDICINE | Facility: CLINIC | Age: 74
End: 2019-04-02

## 2019-04-02 DIAGNOSIS — E03.9 HYPOTHYROIDISM, UNSPECIFIED TYPE: Primary | ICD-10-CM

## 2019-04-02 NOTE — TELEPHONE ENCOUNTER
Patient said you wanted her to repeat her thyroid labs.  Please put new orders in for Jone.  She also wanted to know if she is taking the correct vitamin D.  She is taking nature made D3 50 mcg (2000 iu) daily.

## 2019-04-11 ENCOUNTER — TELEPHONE (OUTPATIENT)
Dept: INTERNAL MEDICINE | Facility: CLINIC | Age: 74
End: 2019-04-11

## 2019-04-11 ENCOUNTER — APPOINTMENT (OUTPATIENT)
Dept: LAB | Facility: HOSPITAL | Age: 74
End: 2019-04-11
Attending: INTERNAL MEDICINE
Payer: MEDICARE

## 2019-04-11 DIAGNOSIS — E03.9 HYPOTHYROIDISM, UNSPECIFIED TYPE: ICD-10-CM

## 2019-04-11 LAB
T4 FREE SERPL-MCNC: 0.79 NG/DL (ref 0.58–1.64)
TSH SERPL DL<=0.05 MIU/L-ACNC: 1.37 MIU/L (ref 0.34–5.6)

## 2019-04-11 PROCEDURE — 84443 ASSAY THYROID STIM HORMONE: CPT

## 2019-04-11 PROCEDURE — 84439 ASSAY OF FREE THYROXINE: CPT

## 2019-04-11 PROCEDURE — 36415 COLL VENOUS BLD VENIPUNCTURE: CPT

## 2019-05-06 ENCOUNTER — TELEPHONE (OUTPATIENT)
Dept: INTERNAL MEDICINE | Facility: CLINIC | Age: 74
End: 2019-05-06

## 2019-05-06 RX ORDER — ATORVASTATIN CALCIUM 20 MG/1
TABLET, FILM COATED ORAL
Qty: 30 TABLET | Refills: 11 | Status: SHIPPED | OUTPATIENT
Start: 2019-05-06 | End: 2020-05-08

## 2019-05-06 NOTE — TELEPHONE ENCOUNTER
Dr. Faust's office called stating that the patient asked their office to fax over her office note but Dr. Faust has EPIC also and stated that the notes should be in care everywhere for Dr. Velasquez to see.

## 2019-05-30 ENCOUNTER — CONSULT (OUTPATIENT)
Dept: INTERNAL MEDICINE | Facility: CLINIC | Age: 74
End: 2019-05-30
Payer: MEDICARE

## 2019-05-30 VITALS
HEART RATE: 55 BPM | SYSTOLIC BLOOD PRESSURE: 116 MMHG | WEIGHT: 145 LBS | HEIGHT: 65 IN | BODY MASS INDEX: 24.16 KG/M2 | OXYGEN SATURATION: 98 % | DIASTOLIC BLOOD PRESSURE: 70 MMHG | TEMPERATURE: 98.2 F

## 2019-05-30 DIAGNOSIS — E03.9 HYPOTHYROIDISM, UNSPECIFIED TYPE: ICD-10-CM

## 2019-05-30 DIAGNOSIS — Z01.818 PRE-OPERATIVE CLEARANCE: Primary | ICD-10-CM

## 2019-05-30 DIAGNOSIS — E87.1 HYPONATREMIA: ICD-10-CM

## 2019-05-30 PROCEDURE — 99214 OFFICE O/P EST MOD 30 MIN: CPT | Performed by: INTERNAL MEDICINE

## 2019-05-31 ENCOUNTER — TELEPHONE (OUTPATIENT)
Dept: INTERNAL MEDICINE | Facility: CLINIC | Age: 74
End: 2019-05-31

## 2019-06-03 ENCOUNTER — TELEPHONE (OUTPATIENT)
Dept: INTERNAL MEDICINE | Facility: CLINIC | Age: 74
End: 2019-06-03

## 2019-06-03 ENCOUNTER — APPOINTMENT (OUTPATIENT)
Dept: LAB | Facility: HOSPITAL | Age: 74
End: 2019-06-03
Attending: INTERNAL MEDICINE
Payer: MEDICARE

## 2019-06-03 DIAGNOSIS — E87.1 HYPONATREMIA: ICD-10-CM

## 2019-06-03 LAB
ANION GAP SERPL CALC-SCNC: 6 MEQ/L (ref 3–15)
BUN SERPL-MCNC: 11 MG/DL (ref 8–20)
CALCIUM SERPL-MCNC: 9.3 MG/DL (ref 8.9–10.3)
CHLORIDE SERPL-SCNC: 101 MEQ/L (ref 98–109)
CO2 SERPL-SCNC: 30 MEQ/L (ref 22–32)
CREAT SERPL-MCNC: 0.8 MG/DL
GFR SERPL CREATININE-BSD FRML MDRD: >60 ML/MIN/1.73M*2
GLUCOSE SERPL-MCNC: 90 MG/DL (ref 70–99)
POTASSIUM SERPL-SCNC: 4.2 MEQ/L (ref 3.6–5.1)
SODIUM SERPL-SCNC: 137 MEQ/L (ref 136–144)

## 2019-06-03 PROCEDURE — 36415 COLL VENOUS BLD VENIPUNCTURE: CPT

## 2019-06-03 PROCEDURE — 80048 BASIC METABOLIC PNL TOTAL CA: CPT

## 2019-06-05 ENCOUNTER — TELEPHONE (OUTPATIENT)
Dept: INTERNAL MEDICINE | Facility: CLINIC | Age: 74
End: 2019-06-05

## 2019-06-05 NOTE — TELEPHONE ENCOUNTER
I left a message for the pt to call me regarding her ECG- can you please try her again   She has a right bundle branch block and will need to see cardiology for an echo prior to her upcoming surgery

## 2019-06-06 ENCOUNTER — TELEPHONE (OUTPATIENT)
Dept: CARDIOLOGY | Facility: CLINIC | Age: 74
End: 2019-06-06

## 2019-06-06 ENCOUNTER — OFFICE VISIT (OUTPATIENT)
Dept: CARDIOLOGY | Facility: CLINIC | Age: 74
End: 2019-06-06
Payer: MEDICARE

## 2019-06-06 ENCOUNTER — TELEPHONE (OUTPATIENT)
Dept: SCHEDULING | Facility: CLINIC | Age: 74
End: 2019-06-06

## 2019-06-06 ENCOUNTER — HOSPITAL ENCOUNTER (OUTPATIENT)
Dept: CARDIOLOGY | Facility: CLINIC | Age: 74
Discharge: HOME | End: 2019-06-06
Attending: INTERNAL MEDICINE
Payer: MEDICARE

## 2019-06-06 VITALS
HEIGHT: 65 IN | WEIGHT: 145 LBS | BODY MASS INDEX: 24.16 KG/M2 | HEART RATE: 52 BPM | DIASTOLIC BLOOD PRESSURE: 78 MMHG | OXYGEN SATURATION: 98 % | SYSTOLIC BLOOD PRESSURE: 102 MMHG

## 2019-06-06 VITALS — BODY MASS INDEX: 24.16 KG/M2 | WEIGHT: 145 LBS | HEIGHT: 65 IN

## 2019-06-06 DIAGNOSIS — R94.31 ABNORMAL EKG: ICD-10-CM

## 2019-06-06 DIAGNOSIS — R94.31 ABNORMAL EKG: Primary | ICD-10-CM

## 2019-06-06 DIAGNOSIS — R94.31 ABNORMAL ECG: Primary | ICD-10-CM

## 2019-06-06 PROBLEM — E78.2 MIXED HYPERLIPIDEMIA: Status: ACTIVE | Noted: 2019-06-06

## 2019-06-06 LAB
AORTIC ROOT ANNULUS: 2.94 CM
AORTIC VALVE AREA: 1.92 CM2
AORTIC VALVE MEAN VELOCITY: 0.93 M/S
AORTIC VALVE VELOCITY TIME INTEGRAL: 29.99 CM
ASCENDING AORTA: 3.22 CM
AV MEAN GRADIENT: 3.88 MMHG
AV PEAK GRADIENT: 7.35 MMHG
AV PEAK VELOCITY-S: 1.36 M/S
AV VALVE AREA: 1.92 CM2
BSA FOR ECHO PROCEDURE: 1.74 M2
CUSP SEPARATION: 1.89 CM
DOP CALC LVOT STROKE VOLUME: 61.64 ML
DOP CALC RVOT VTI: 15.46 CM
E WAVE DECELERATION TIME: 272.05 MS
E/A RATIO: 1.08
E/E' RATIO: 11.85
EDV (BP): 52.85 ML
EF (A4C): 76.15 %
EF A2C: 72.61 %
EJECTION FRACTION: 73.38 %
ESV (BP): 14.07 ML
INTERVENTRICULAR SEPTUM: 0.86 CM
LA ESV (BP): 35.52 ML
LA ESV INDEX (A2C): 28.69 ML/M2
LA ESV INDEX (BP): 20.41 ML/M2
LA/AORTA RATIO: 1.26
LAD 2D - M-MODE: 3.68 CM
LAD 2D: 3 CM
LAV-S: 49.92 ML
LEFT ATRIUM VOLUME INDEX: 12.46 ML/M2
LEFT ATRIUM VOLUME: 21.68 ML
LEFT INTERNAL DIMENSION IN SYSTOLE: 2.53 CM (ref 2.41–3.64)
LEFT VENTRICLE DIASTOLIC VOLUME INDEX: 35.48 ML/M2
LEFT VENTRICLE DIASTOLIC VOLUME: 61.73 ML
LEFT VENTRICLE SYSTOLIC VOLUME INDEX: 8.46 ML/M2
LEFT VENTRICLE SYSTOLIC VOLUME: 14.72 ML
LEFT VENTRICULAR INTERNAL DIMENSION IN DIASTOLE: 4.37 CM (ref 4.06–5.64)
LV DIASTOLIC VOLUME: 41 ML
LV ESV (APICAL 2 CHAMBER): 11.25 ML
LVEDVI(A2C): 23.56 ML/M2
LVEDVI(BP): 30.37 ML/M2
LVESVI(A2C): 6.47 ML/M2
LVESVI(BP): 8.09 ML/M2
LVOT 2D: 1.81 CM
LVOT MG: 2.53 MMHG
LVOT MV: 0.75 M/S
LVOT PEAK VELOCITY: 1.01 M/S
LVOT VTI: 23.97 CM
MV E'TISSUE VEL-LAT: 0.11 M/S
MV E'TISSUE VEL-MED: 0.06 M/S
MV PEAK A VEL: 0.68 M/S
MV PEAK E VEL: 0.73 M/S
MV STENOSIS PRESSURE HALF TIME: 78.9 MS
MV VALVE AREA P 1/2 METHOD: 2.79 CM2
POSTERIOR WALL: 0.78 CM
RVOT VMAX: 0.61 M/S
SEPTAL TISSUE DOPPLER FREE WALL LATE DIA VELOCITY (APICAL 4 CHAMBER VIEW): 12 M/S
TR MAX PG: 15.35 MMHG
TRICUSPID VALVE PEAK REGURGITATION VELOCITY: 1.96 M/S
Z-SCORE OF LEFT VENTRICULAR DIMENSION IN END DIASTOLE: -0.9
Z-SCORE OF LEFT VENTRICULAR DIMENSION IN END SYSTOLE: -1.24

## 2019-06-06 PROCEDURE — 93306 TTE W/DOPPLER COMPLETE: CPT | Performed by: INTERNAL MEDICINE

## 2019-06-06 PROCEDURE — 99204 OFFICE O/P NEW MOD 45 MIN: CPT | Performed by: INTERNAL MEDICINE

## 2019-06-06 PROCEDURE — 93000 ELECTROCARDIOGRAM COMPLETE: CPT | Performed by: INTERNAL MEDICINE

## 2019-06-06 ASSESSMENT — ENCOUNTER SYMPTOMS
PALPITATIONS: 0
PND: 0
BRUISES/BLEEDS EASILY: 0
MYALGIAS: 0
JAUNDICE: 0
MEMORY LOSS: 0
LIGHT-HEADEDNESS: 0
SYNCOPE: 0
WHEEZING: 0
CHANGE IN BOWEL HABIT: 0
CLAUDICATION: 0
SPUTUM PRODUCTION: 0
ANOREXIA: 0
TREMORS: 0
WEIGHT LOSS: 0
IRREGULAR HEARTBEAT: 0
FALLS: 0
FOCAL WEAKNESS: 0
HEARTBURN: 0
HOARSE VOICE: 0
ABDOMINAL PAIN: 0
SHORTNESS OF BREATH: 0
WEIGHT GAIN: 0
HEMATOLOGIC/LYMPHATIC NEGATIVE: 1
COUGH: 0
DIZZINESS: 0
INSOMNIA: 0
NUMBNESS: 0
SLEEP DISTURBANCES DUE TO BREATHING: 0
ORTHOPNEA: 0
MUSCLE CRAMPS: 0
DIARRHEA: 0
NAUSEA: 0
NERVOUS/ANXIOUS: 0
NEAR-SYNCOPE: 0
HEADACHES: 0
CONSTIPATION: 0
VERTIGO: 0
DYSPNEA ON EXERTION: 0
FREQUENCY: 0
HEMOPTYSIS: 0
SNORING: 0

## 2019-06-06 NOTE — TELEPHONE ENCOUNTER
Dr Veronica Vann from Dr Faust [facility] called for ccv and echo to be faxed to her [phone]  ph [phone]  Done

## 2019-06-06 NOTE — TELEPHONE ENCOUNTER
Spoke with the patient.  She has a right bundle branch block on her EKG pleat.  Is asymptomatic.  Should be fine to do her surgery however anesthesiology is requesting cardiac clearance.  Will ask cardiology to see for echocardiogram today

## 2019-06-06 NOTE — ASSESSMENT & PLAN NOTE
EKG bifascicular block isolated electrical abnormality I do not have the old one to compare to.  She has no cardiovascular complaints  Her father had cardiac issues at age 60 but he was a heavy cigarette smoker  In the future we talked about the screening testing with coronary calcium score  Echocardiogram performed today and personally reviewed is essentially normal no cardiac contraindications to undergoing gynecologic surgery

## 2019-06-06 NOTE — LETTER
June 6, 2019     Sugar Means MD  [address] [facility][[id number]] [id number]    Patient: Katheryn Goncalves  YOB: 1945  Date of Visit: 6/6/2019      Dear Stephanie    Thank you for referring Katheryn Goncalves to me for evaluation. Below are my notes for this consultation.    If you have questions, please do not hesitate to call me. I look forward to following your patient along with you.         Sincerely,        Rancho Martin MD        CC: Shauna Falk, MD Raquel Funk MD Becker, Rancho JOHNSON MD  6/6/2019 11:02 AM  Sign at close encounter  Cardiology Consult/New Patient    Sugar Mathews MD          Katheryn Goncalves is a 73 y.o. female identifies as who presents with   She is here for preop cardiac evaluation prior to surgery for acute urinary incontinence  Surgery is to be done robotically at [facility], Dr. Perez  Her preoperative EKG was abnormal bifascicular block, and she had echocardiogram done today images personally reviewed mild mitral regurgitation otherwise normal study  This could be a long standing electrical abnormality on EKG but I have no previous ones to compare to  She has no cardiovascular complaints she is quite active      Patient Active Problem List    Diagnosis Date Noted   • Abnormal EKG 06/06/2019   • Mixed hyperlipidemia 06/06/2019   • Pre-operative clearance 05/30/2019   • Vaginal vault prolapse, posthysterectomy 08/01/2018   • Esophageal reflux 09/22/2017   • Hypothyroidism 09/22/2017   • Anemia 10/22/2014   • Barretts esophagus 10/22/2014       Medical History:   Past Medical History:   Diagnosis Date   • Marshall esophagus    • High cholesterol    • Hypothyroidism    • Osteoporosis        Surgical History:   Past Surgical History:   Procedure Laterality Date   • CHOLECYSTECTOMY     • FEMORAL HERNIA REPAIR Right     bowel dusky but OK - no resection   • TOTAL VAGINAL HYSTERECTOMY  1997       Allergies:  Patient has no known allergies.    Current Outpatient Prescriptions   Medication Sig Dispense Refill   • acetaminophen (TYLENOL) 500 mg tablet Take 500 mg by mouth every 6 (six) hours as needed for mild pain (Takes PRN only).     • atorvastatin (LIPITOR) 20 mg tablet TAKE ONE TABLET BY MOUTH ONCE DAILY 30 tablet 11   • denosumab (PROLIA) 60 mg/mL syringe Inject 60 mg under the skin once. Injection 2 x yearly     • esomeprazole (NexIUM) 40 mg capsule Take 40 mg by mouth daily.     • levothyroxine (SYNTHROID) 88 mcg tablet Take 1 tablet (88 mcg total) by mouth daily. 90 tablet 3     No current facility-administered medications for this visit.        Social History:   Social History     Social History   • Marital status:      Spouse name: N/A   • Number of children: N/A   • Years of education: N/A     Social History Main Topics   • Smoking status: Never Smoker   • Smokeless tobacco: Never Used   • Alcohol use No   • Drug use: No   • Sexual activity: No     Other Topics Concern   • Not on file     Social History Narrative   • No narrative on file       Family History:   Family History   Problem Relation Age of Onset   • Heart disease Father    • Colon cancer Mother        Review of Systems   Review of Systems   Constitution: Negative for malaise/fatigue, weight gain and weight loss.   HENT: Negative for hearing loss and hoarse voice.    Eyes: Negative for visual disturbance.   Cardiovascular: Negative for chest pain, claudication, cyanosis, dyspnea on exertion, irregular heartbeat, leg swelling, near-syncope, orthopnea, palpitations, paroxysmal nocturnal dyspnea and syncope.   Respiratory: Negative for cough, hemoptysis, shortness of breath, sleep disturbances due to breathing, snoring, sputum production and wheezing.    Endocrine: Negative for cold intolerance and heat intolerance.   Hematologic/Lymphatic: Negative.  Negative for bleeding problem. Does not bruise/bleed easily.   Skin: Negative.  Negative for  rash.   Musculoskeletal: Negative for arthritis, falls, joint pain, muscle cramps and myalgias.   Gastrointestinal: Negative for abdominal pain, anorexia, change in bowel habit, constipation, diarrhea, dysphagia, heartburn, jaundice and nausea.   Genitourinary: Positive for bladder incontinence. Negative for frequency and nocturia.   Neurological: Negative for dizziness, focal weakness, headaches, light-headedness, numbness, tremors and vertigo.   Psychiatric/Behavioral: Negative for memory loss. The patient does not have insomnia and is not nervous/anxious.    Allergic/Immunologic: Negative for hives.       Objective       Vitals:    06/06/19 1040   BP: 102/78   Pulse: (!) 52   SpO2: 98%       Physical Exam   Constitutional: She is oriented to person, place, and time. She appears well-developed and well-nourished. No distress.   HENT:   Head: Normocephalic and atraumatic.   Nose: Nose normal.   Eyes: Conjunctivae are normal. No scleral icterus.   Neck: No JVD present.   Cardiovascular: Normal rate, regular rhythm, normal heart sounds and intact distal pulses.  Exam reveals no gallop and no friction rub.    No murmur heard.  Pulmonary/Chest: Effort normal. No stridor. No respiratory distress. She has no wheezes. She has no rales. She exhibits no tenderness.   Abdominal: There is no tenderness.   Musculoskeletal: She exhibits no edema or deformity.   Neurological: She is alert and oriented to person, place, and time.   Skin: Skin is warm and dry.   Psychiatric: She has a normal mood and affect.        Labs   Lab Results   Component Value Date    WBC 5.32 03/23/2018    HGB 11.4 (L) 03/23/2018    HCT 31.2 (L) 03/23/2018     03/23/2018    CHOL 129 02/06/2018    TRIG 57 02/06/2018    HDL 45 (L) 02/06/2018    ALT 16 02/06/2018    AST 20 02/06/2018     06/03/2019    K 4.2 06/03/2019     06/03/2019    CREATININE 0.8 06/03/2019    BUN 11 06/03/2019    CO2 30 06/03/2019    TSH 1.37 04/11/2019    INR 1.0  03/18/2018       Imaging  ECHO  June 2019 normal LV systolic function mild mitral regurgitation    ECG normal sinus rhythm left anterior hemiblock incomplete right bundle branch block heart rate 50        Assessment/Plan     Abnormal EKG  EKG bifascicular block isolated electrical abnormality I do not have the old one to compare to.  She has no cardiovascular complaints  Her father had cardiac issues at age 60 but he was a heavy cigarette smoker  In the future we talked about the screening testing with coronary calcium score  Echocardiogram performed today and personally reviewed is essentially normal no cardiac contraindications to undergoing gynecologic surgery    Hypothyroidism  On replacement    Mixed hyperlipidemia  On statin therapy    Vaginal vault prolapse, posthysterectomy  Will be having robotic surgical intervention for this at [facility] with   No cardiovascular contraindications to undergoing surgery         This letter was generated using speech recognition software.  Please excuse any typographical errors.    Rancho Martin MD  6/6/2019

## 2019-06-06 NOTE — PROGRESS NOTES
Cardiology Consult/New Patient    Sugar Mathews MD          Katheryn Goncalves is a 73 y.o. female identifies as who presents with   She is here for preop cardiac evaluation prior to surgery for acute urinary incontinence  Surgery is to be done robotically at [facility], Dr. Perez  Her preoperative EKG was abnormal bifascicular block, and she had echocardiogram done today images personally reviewed mild mitral regurgitation otherwise normal study  This could be a long standing electrical abnormality on EKG but I have no previous ones to compare to  She has no cardiovascular complaints she is quite active      Patient Active Problem List    Diagnosis Date Noted   • Abnormal EKG 06/06/2019   • Mixed hyperlipidemia 06/06/2019   • Pre-operative clearance 05/30/2019   • Vaginal vault prolapse, posthysterectomy 08/01/2018   • Esophageal reflux 09/22/2017   • Hypothyroidism 09/22/2017   • Anemia 10/22/2014   • Barretts esophagus 10/22/2014       Medical History:   Past Medical History:   Diagnosis Date   • Marshall esophagus    • High cholesterol    • Hypothyroidism    • Osteoporosis        Surgical History:   Past Surgical History:   Procedure Laterality Date   • CHOLECYSTECTOMY     • FEMORAL HERNIA REPAIR Right     bowel dusky but OK - no resection   • TOTAL VAGINAL HYSTERECTOMY  1997       Allergies: Patient has no known allergies.    Current Outpatient Prescriptions   Medication Sig Dispense Refill   • acetaminophen (TYLENOL) 500 mg tablet Take 500 mg by mouth every 6 (six) hours as needed for mild pain (Takes PRN only).     • atorvastatin (LIPITOR) 20 mg tablet TAKE ONE TABLET BY MOUTH ONCE DAILY 30 tablet 11   • denosumab (PROLIA) 60 mg/mL syringe Inject 60 mg under the skin once. Injection 2 x yearly     • esomeprazole (NexIUM) 40 mg capsule Take 40 mg by mouth daily.     • levothyroxine (SYNTHROID) 88 mcg tablet Take 1 tablet (88 mcg total) by mouth daily. 90 tablet 3     No current  facility-administered medications for this visit.        Social History:   Social History     Social History   • Marital status:      Spouse name: N/A   • Number of children: N/A   • Years of education: N/A     Social History Main Topics   • Smoking status: Never Smoker   • Smokeless tobacco: Never Used   • Alcohol use No   • Drug use: No   • Sexual activity: No     Other Topics Concern   • Not on file     Social History Narrative   • No narrative on file       Family History:   Family History   Problem Relation Age of Onset   • Heart disease Father    • Colon cancer Mother        Review of Systems   Review of Systems   Constitution: Negative for malaise/fatigue, weight gain and weight loss.   HENT: Negative for hearing loss and hoarse voice.    Eyes: Negative for visual disturbance.   Cardiovascular: Negative for chest pain, claudication, cyanosis, dyspnea on exertion, irregular heartbeat, leg swelling, near-syncope, orthopnea, palpitations, paroxysmal nocturnal dyspnea and syncope.   Respiratory: Negative for cough, hemoptysis, shortness of breath, sleep disturbances due to breathing, snoring, sputum production and wheezing.    Endocrine: Negative for cold intolerance and heat intolerance.   Hematologic/Lymphatic: Negative.  Negative for bleeding problem. Does not bruise/bleed easily.   Skin: Negative.  Negative for rash.   Musculoskeletal: Negative for arthritis, falls, joint pain, muscle cramps and myalgias.   Gastrointestinal: Negative for abdominal pain, anorexia, change in bowel habit, constipation, diarrhea, dysphagia, heartburn, jaundice and nausea.   Genitourinary: Positive for bladder incontinence. Negative for frequency and nocturia.   Neurological: Negative for dizziness, focal weakness, headaches, light-headedness, numbness, tremors and vertigo.   Psychiatric/Behavioral: Negative for memory loss. The patient does not have insomnia and is not nervous/anxious.    Allergic/Immunologic: Negative for  hives.       Objective       Vitals:    06/06/19 1040   BP: 102/78   Pulse: (!) 52   SpO2: 98%       Physical Exam   Constitutional: She is oriented to person, place, and time. She appears well-developed and well-nourished. No distress.   HENT:   Head: Normocephalic and atraumatic.   Nose: Nose normal.   Eyes: Conjunctivae are normal. No scleral icterus.   Neck: No JVD present.   Cardiovascular: Normal rate, regular rhythm, normal heart sounds and intact distal pulses.  Exam reveals no gallop and no friction rub.    No murmur heard.  Pulmonary/Chest: Effort normal. No stridor. No respiratory distress. She has no wheezes. She has no rales. She exhibits no tenderness.   Abdominal: There is no tenderness.   Musculoskeletal: She exhibits no edema or deformity.   Neurological: She is alert and oriented to person, place, and time.   Skin: Skin is warm and dry.   Psychiatric: She has a normal mood and affect.        Labs   Lab Results   Component Value Date    WBC 5.32 03/23/2018    HGB 11.4 (L) 03/23/2018    HCT 31.2 (L) 03/23/2018     03/23/2018    CHOL 129 02/06/2018    TRIG 57 02/06/2018    HDL 45 (L) 02/06/2018    ALT 16 02/06/2018    AST 20 02/06/2018     06/03/2019    K 4.2 06/03/2019     06/03/2019    CREATININE 0.8 06/03/2019    BUN 11 06/03/2019    CO2 30 06/03/2019    TSH 1.37 04/11/2019    INR 1.0 03/18/2018       Imaging  ECHO  June 2019 normal LV systolic function mild mitral regurgitation    CAT SCAn  Cor vamsi score 1 calcifciatons r breast8/19      ECG normal sinus rhythm left anterior hemiblock incomplete right bundle branch block heart rate 50        Assessment/Plan     Abnormal EKG  EKG bifascicular block isolated electrical abnormality I do not have the old one to compare to.  She has no cardiovascular complaints  Her father had cardiac issues at age 60 but he was a heavy cigarette smoker  In the future we talked about the screening testing with coronary calcium score  Echocardiogram  performed today and personally reviewed is essentially normal no cardiac contraindications to undergoing gynecologic surgery    Hypothyroidism  On replacement    Mixed hyperlipidemia  On statin therapy    Vaginal vault prolapse, posthysterectomy  Will be having robotic surgical intervention for this at [facility] with   No cardiovascular contraindications to undergoing surgery         This letter was generated using speech recognition software.  Please excuse any typographical errors.    Rancho Martin MD  6/6/2019

## 2019-06-06 NOTE — ASSESSMENT & PLAN NOTE
Will be having robotic surgical intervention for this at [facility] with   No cardiovascular contraindications to undergoing surgery

## 2019-06-06 NOTE — TELEPHONE ENCOUNTER
Nori from Dr. Velasquez's office called to see if we could see a new pt today for an echo and ov.  Pt is having surgery on Monday, Venita 10, 2019 and will need cardiac clearance.  Pt is having postier repair for chronic urinary incontinence and sacrocrolpexy.  Her office EKG showed right bundle branch block.      Pt is coming in at 10A, today for the echo and np ov appts.

## 2019-07-29 ENCOUNTER — TRANSCRIBE ORDERS (OUTPATIENT)
Dept: OPHTHALMOLOGY | Facility: HOSPITAL | Age: 74
End: 2019-07-29

## 2019-07-29 ENCOUNTER — APPOINTMENT (OUTPATIENT)
Dept: OPHTHALMOLOGY | Facility: HOSPITAL | Age: 74
End: 2019-07-29
Attending: OPHTHALMOLOGY
Payer: MEDICARE

## 2019-07-29 DIAGNOSIS — H40.053 OCULAR HYPERTENSION, BILATERAL: ICD-10-CM

## 2019-07-29 DIAGNOSIS — H40.053 OCULAR HYPERTENSION, BILATERAL: Primary | ICD-10-CM

## 2019-07-29 PROCEDURE — 92133 CPTRZD OPH DX IMG PST SGM ON: CPT

## 2019-08-19 ENCOUNTER — HOSPITAL ENCOUNTER (OUTPATIENT)
Dept: RADIOLOGY | Facility: HOSPITAL | Age: 74
Discharge: HOME | End: 2019-08-19
Attending: INTERNAL MEDICINE
Payer: MEDICARE

## 2019-08-19 ENCOUNTER — TELEPHONE (OUTPATIENT)
Dept: CARDIOLOGY | Facility: CLINIC | Age: 74
End: 2019-08-19

## 2019-08-19 DIAGNOSIS — R94.31 ABNORMAL ECG: ICD-10-CM

## 2019-08-19 PROCEDURE — 75571 CT HRT W/O DYE W/CA TEST: CPT

## 2019-08-19 NOTE — TELEPHONE ENCOUNTER
I spoke to her this evening about results of coronary calcium score with total number of 1  She does have calcifications noted in the right breast  She had a mammogram last year that was read as dense breast and normal  I asked her to follow-up with you to see if you  thought she need to repeat study or if any other image modality was indicated, she is seeing you tomorrow  Thanks  adonis

## 2019-08-22 ENCOUNTER — OFFICE VISIT (OUTPATIENT)
Dept: INTERNAL MEDICINE | Facility: CLINIC | Age: 74
End: 2019-08-22
Payer: MEDICARE

## 2019-08-22 DIAGNOSIS — M81.0 AGE-RELATED OSTEOPOROSIS WITHOUT CURRENT PATHOLOGICAL FRACTURE: ICD-10-CM

## 2019-08-22 DIAGNOSIS — Z12.11 SCREENING FOR COLON CANCER: ICD-10-CM

## 2019-08-22 DIAGNOSIS — Z98.890 HISTORY OF SACROCOLPOPEXY: ICD-10-CM

## 2019-08-22 DIAGNOSIS — E78.5 HYPERLIPIDEMIA, UNSPECIFIED HYPERLIPIDEMIA TYPE: ICD-10-CM

## 2019-08-22 DIAGNOSIS — E03.9 HYPOTHYROIDISM, UNSPECIFIED TYPE: ICD-10-CM

## 2019-08-22 DIAGNOSIS — R92.1 CALCIFICATION OF RIGHT BREAST: ICD-10-CM

## 2019-08-22 DIAGNOSIS — Z00.00 MEDICARE ANNUAL WELLNESS VISIT, SUBSEQUENT: Primary | ICD-10-CM

## 2019-08-22 PROCEDURE — G0439 PPPS, SUBSEQ VISIT: HCPCS | Performed by: INTERNAL MEDICINE

## 2019-08-22 ASSESSMENT — MINI COG
TOTAL SCORE: 5
COMPLETED: YES

## 2019-08-22 NOTE — PROGRESS NOTES
Subjective      Patient ID: Katheryn Goncalves is a 73 y.o. female.    He is here for her Medicare annual wellness exam.  Has been doing very well.  In June underwent laparoscopic robotic assisted sacral colpopexy with a retropubic mid urethral sling and a posterior vaginal repair with Dr. Raquel Faust.  She has had very good results from this area offers no complaints today.  So has had a coronary calcium score ordered by Dr. Rancho Martin.  This was unremarkable for significant calcification in the coronary arteries but did notice new right breast calcifications for which she will need follow-up mammogram.  Does not feel any mass in her breast.  Breast pain        The following have been reviewed and updated as appropriate in this visit:       Past Medical History:   Diagnosis Date   • Marshall esophagus    • High cholesterol    • Hypothyroidism    • Osteoporosis      Past Surgical History:   Procedure Laterality Date   • CHOLECYSTECTOMY     • FEMORAL HERNIA REPAIR Right     bowel dusky but OK - no resection   • TOTAL VAGINAL HYSTERECTOMY  1997     Family History   Problem Relation Age of Onset   • Heart disease Father    • Colon cancer Mother      Social History     Social History   • Marital status:      Spouse name: N/A   • Number of children: N/A   • Years of education: N/A     Occupational History   • Not on file.     Social History Main Topics   • Smoking status: Never Smoker   • Smokeless tobacco: Never Used   • Alcohol use No   • Drug use: No   • Sexual activity: No     Other Topics Concern   • Not on file     Social History Narrative   • No narrative on file       Review of Systems   Constitutional: Negative for appetite change, fatigue and unexpected weight change.   HENT: Negative for hearing loss and voice change.    Eyes: Negative for visual disturbance.   Respiratory: Negative for chest tightness, shortness of breath and wheezing.    Cardiovascular: Negative.  Negative for chest pain, palpitations  "and leg swelling.   Gastrointestinal: Negative for abdominal pain, blood in stool, constipation and diarrhea.   Endocrine: Negative.  Negative for cold intolerance, heat intolerance, polydipsia, polyphagia and polyuria.   Genitourinary: Negative for dysuria, frequency, pelvic pain, urgency and vaginal pain.   Musculoskeletal: Negative for arthralgias, back pain, gait problem, joint swelling, myalgias and neck pain.   Skin: Negative for color change, pallor and rash.   Neurological: Negative for dizziness, weakness and headaches.   Hematological: Negative.    Psychiatric/Behavioral: Negative for behavioral problems and sleep disturbance. The patient is not nervous/anxious.        No Known Allergies  Current Outpatient Prescriptions   Medication Sig Dispense Refill   • acetaminophen (TYLENOL) 500 mg tablet Take 500 mg by mouth every 6 (six) hours as needed for mild pain (Takes PRN only).     • atorvastatin (LIPITOR) 20 mg tablet TAKE ONE TABLET BY MOUTH ONCE DAILY 30 tablet 11   • denosumab (PROLIA) 60 mg/mL syringe Inject 60 mg under the skin once. Injection 2 x yearly     • esomeprazole (NexIUM) 40 mg capsule Take 40 mg by mouth daily.     • levothyroxine (SYNTHROID) 88 mcg tablet Take 1 tablet (88 mcg total) by mouth daily. 90 tablet 3     No current facility-administered medications for this visit.        Objective   Vitals:    08/22/19 1608 08/23/19 0732   BP: (!) 144/70 132/78   Pulse: (!) 53    Temp: 36.9 °C (98.4 °F)    SpO2: 97%    Weight: 66.2 kg (146 lb)    Height: 1.651 m (5' 5\")        Physical Exam   Constitutional: She is oriented to person, place, and time. She appears well-developed and well-nourished.   HENT:   Head: Normocephalic and atraumatic.   Neck: Normal range of motion. Neck supple. No thyromegaly present.   Cardiovascular: Normal rate, regular rhythm and normal heart sounds.    No murmur heard.  Pulmonary/Chest: Effort normal and breath sounds normal. Right breast exhibits no mass, no nipple " discharge and no skin change. Left breast exhibits no mass, no nipple discharge and no skin change.   Abdominal: Soft. Bowel sounds are normal. She exhibits no mass. There is no tenderness.   Musculoskeletal: She exhibits no edema.   Neurological: She is alert and oriented to person, place, and time.   Skin: Skin is warm and dry.   Psychiatric: She has a normal mood and affect. Her behavior is normal. Judgment and thought content normal.   Nursing note and vitals reviewed.      Assessment/Plan   Problem List Items Addressed This Visit     Hypothyroidism     Continue levothyroxine.  Clinically and biochemically euthyroid         Hyperlipidemia     Able on atorvastatin         Medicare annual wellness visit, subsequent     Annual wellness visit performed. All relevant screenings and immunizations discussed, medications reviewed and reconciled. No safety issues. Cognitive and depression screen performed.End of life planning discussed. Other providers reviewed and listed.         Calcification of right breast - Primary     Will need follow-up mammogram.  No mass appreciated on exam.  Given prescription for mammogram         Relevant Orders    BI DIAGNOSTIC MAMMOGRAM BILATERAL    History of sacrocolpopexy     She is doing quite well postoperatively.  I reviewed Dr. Raquel Faust's most recent note with her.         Age-related osteoporosis without current pathological fracture     Obtained on Prolia.  Follows with Dr. Jyoti Abbasi, DEXA due next year           Other Visit Diagnoses     Screening for colon cancer        Relevant Orders    FIT Medicare          Sugar Means MD    8/23/2019

## 2019-08-23 VITALS
SYSTOLIC BLOOD PRESSURE: 132 MMHG | HEIGHT: 65 IN | OXYGEN SATURATION: 97 % | BODY MASS INDEX: 24.32 KG/M2 | TEMPERATURE: 98.4 F | DIASTOLIC BLOOD PRESSURE: 78 MMHG | HEART RATE: 53 BPM | WEIGHT: 146 LBS

## 2019-08-23 PROBLEM — M81.0 AGE-RELATED OSTEOPOROSIS WITHOUT CURRENT PATHOLOGICAL FRACTURE: Status: ACTIVE | Noted: 2019-08-23

## 2019-08-23 PROBLEM — R92.1 CALCIFICATION OF RIGHT BREAST: Status: ACTIVE | Noted: 2019-08-23

## 2019-08-23 PROBLEM — Z00.00 MEDICARE ANNUAL WELLNESS VISIT, SUBSEQUENT: Status: ACTIVE | Noted: 2019-08-23

## 2019-08-23 PROBLEM — Z98.890 HISTORY OF SACROCOLPOPEXY: Status: ACTIVE | Noted: 2019-08-23

## 2019-08-23 PROBLEM — E78.5 HYPERLIPIDEMIA: Status: ACTIVE | Noted: 2019-06-06

## 2019-08-23 ASSESSMENT — ENCOUNTER SYMPTOMS
SHORTNESS OF BREATH: 0
WEAKNESS: 0
VOICE CHANGE: 0
APPETITE CHANGE: 0
DYSURIA: 0
CARDIOVASCULAR NEGATIVE: 1
NERVOUS/ANXIOUS: 0
NECK PAIN: 0
PALPITATIONS: 0
SLEEP DISTURBANCE: 0
CHEST TIGHTNESS: 0
FATIGUE: 0
CONSTIPATION: 0
DIZZINESS: 0
WHEEZING: 0
DIARRHEA: 0
POLYPHAGIA: 0
BLOOD IN STOOL: 0
ENDOCRINE NEGATIVE: 1
UNEXPECTED WEIGHT CHANGE: 0
POLYDIPSIA: 0
COLOR CHANGE: 0
ARTHRALGIAS: 0
FREQUENCY: 0
HEADACHES: 0
HEMATOLOGIC/LYMPHATIC NEGATIVE: 1
BACK PAIN: 0
JOINT SWELLING: 0
ABDOMINAL PAIN: 0
MYALGIAS: 0

## 2019-08-23 NOTE — ASSESSMENT & PLAN NOTE
Annual wellness visit performed. All relevant screenings and immunizations discussed, medications reviewed and reconciled. No safety issues. Cognitive and depression screen performed.End of life planning discussed. Other providers reviewed and listed.

## 2019-08-23 NOTE — ASSESSMENT & PLAN NOTE
She is doing quite well postoperatively.  I reviewed Dr. Raquel Faust's most recent note with her.

## 2019-08-29 ENCOUNTER — HOSPITAL ENCOUNTER (OUTPATIENT)
Dept: RADIOLOGY | Age: 74
Discharge: HOME | End: 2019-08-29
Attending: INTERNAL MEDICINE
Payer: MEDICARE

## 2019-08-29 DIAGNOSIS — R92.1 CALCIFICATION OF RIGHT BREAST: ICD-10-CM

## 2019-08-29 PROCEDURE — 77066 DX MAMMO INCL CAD BI: CPT

## 2019-09-26 ENCOUNTER — APPOINTMENT (OUTPATIENT)
Dept: LAB | Facility: HOSPITAL | Age: 74
End: 2019-09-26
Attending: OBSTETRICS & GYNECOLOGY
Payer: MEDICARE

## 2019-09-26 ENCOUNTER — TRANSCRIBE ORDERS (OUTPATIENT)
Dept: LAB | Facility: HOSPITAL | Age: 74
End: 2019-09-26

## 2019-09-26 DIAGNOSIS — N30.01 ACUTE CYSTITIS WITH HEMATURIA: Primary | ICD-10-CM

## 2019-09-26 DIAGNOSIS — N30.01 ACUTE CYSTITIS WITH HEMATURIA: ICD-10-CM

## 2019-09-26 LAB
BACTERIA URNS QL MICRO: ABNORMAL /HPF
BILIRUB UR QL STRIP.AUTO: NEGATIVE MG/DL
CLARITY UR REFRACT.AUTO: CLEAR
COLOR UR AUTO: YELLOW
GLUCOSE UR STRIP.AUTO-MCNC: NEGATIVE MG/DL
HGB UR QL STRIP.AUTO: 3
HYALINE CASTS #/AREA URNS LPF: ABNORMAL /LPF
KETONES UR STRIP.AUTO-MCNC: NEGATIVE MG/DL
LEUKOCYTE ESTERASE UR QL STRIP.AUTO: 3
NITRITE UR QL STRIP.AUTO: POSITIVE
PH UR STRIP.AUTO: 7.5 [PH]
PROT UR QL STRIP.AUTO: NEGATIVE
RBC #/AREA URNS HPF: ABNORMAL /HPF
SP GR UR REFRACT.AUTO: 1.01
SQUAMOUS URNS QL MICRO: 1 /HPF
UROBILINOGEN UR STRIP-ACNC: 0.2 EU/DL
WBC #/AREA URNS HPF: ABNORMAL /HPF

## 2019-09-26 PROCEDURE — 81003 URINALYSIS AUTO W/O SCOPE: CPT

## 2019-09-26 PROCEDURE — 87086 URINE CULTURE/COLONY COUNT: CPT

## 2019-09-28 LAB
BACTERIA UR CULT: ABNORMAL
BACTERIA UR CULT: ABNORMAL

## 2019-10-15 ENCOUNTER — TELEPHONE (OUTPATIENT)
Dept: INTERNAL MEDICINE | Facility: CLINIC | Age: 74
End: 2019-10-15

## 2019-10-15 ENCOUNTER — CLINICAL SUPPORT (OUTPATIENT)
Dept: INTERNAL MEDICINE | Facility: CLINIC | Age: 74
End: 2019-10-15
Payer: MEDICARE

## 2019-10-15 DIAGNOSIS — Z23 NEED FOR STREPTOCOCCUS PNEUMONIAE AND INFLUENZA VACCINATION: Primary | ICD-10-CM

## 2019-10-15 DIAGNOSIS — S09.90XA TRAUMATIC INJURY OF HEAD, INITIAL ENCOUNTER: Primary | ICD-10-CM

## 2019-10-15 PROCEDURE — G0008 ADMIN INFLUENZA VIRUS VAC: HCPCS | Performed by: INTERNAL MEDICINE

## 2019-10-15 PROCEDURE — 90653 IIV ADJUVANT VACCINE IM: CPT | Performed by: INTERNAL MEDICINE

## 2019-10-15 NOTE — TELEPHONE ENCOUNTER
Patient takes fish oil 2000 mg a day.  She heard on the news that it doesn't do anything.  Should she continue taking?

## 2019-10-15 NOTE — TELEPHONE ENCOUNTER
Pt banged her head on car door last Thursday  Pt has a bump and bruise in middle of forehead  No headaches  No side effects  Just sore

## 2019-10-16 NOTE — TELEPHONE ENCOUNTER
Pt called back ;  Not sure if it is necessary to get a ct scan;  I informed pt that I would mail out the order to her home if she decided to get done

## 2019-10-29 ENCOUNTER — HOSPITAL ENCOUNTER (OUTPATIENT)
Dept: RADIOLOGY | Facility: HOSPITAL | Age: 74
Discharge: HOME | End: 2019-10-29
Attending: INTERNAL MEDICINE
Payer: MEDICARE

## 2019-10-29 ENCOUNTER — TELEPHONE (OUTPATIENT)
Dept: INTERNAL MEDICINE | Facility: CLINIC | Age: 74
End: 2019-10-29

## 2019-10-29 DIAGNOSIS — S09.90XA TRAUMATIC INJURY OF HEAD, INITIAL ENCOUNTER: ICD-10-CM

## 2019-10-29 PROCEDURE — 70450 CT HEAD/BRAIN W/O DYE: CPT

## 2020-01-06 ENCOUNTER — TELEPHONE (OUTPATIENT)
Dept: INTERNAL MEDICINE | Facility: CLINIC | Age: 75
End: 2020-01-06

## 2020-01-06 DIAGNOSIS — E03.9 HYPOTHYROIDISM, UNSPECIFIED TYPE: Primary | ICD-10-CM

## 2020-01-06 RX ORDER — ESOMEPRAZOLE MAGNESIUM 40 MG/1
40 CAPSULE, DELAYED RELEASE ORAL DAILY
Qty: 90 CAPSULE | Refills: 0 | Status: SHIPPED | OUTPATIENT
Start: 2020-01-06 | End: 2024-03-07

## 2020-01-06 NOTE — TELEPHONE ENCOUNTER
1) pt requesting blood work for thyroid test  Erik whitaker ;  Pt has been feeling tired    2) PT also requesting prescription for generic Nexium 40mg to be sent to [facility] 1 daily

## 2020-01-14 ENCOUNTER — APPOINTMENT (OUTPATIENT)
Dept: LAB | Facility: HOSPITAL | Age: 75
End: 2020-01-14
Attending: NURSE PRACTITIONER
Payer: MEDICARE

## 2020-01-14 DIAGNOSIS — E03.9 HYPOTHYROIDISM, UNSPECIFIED TYPE: ICD-10-CM

## 2020-01-14 LAB
T4 FREE SERPL-MCNC: 0.86 NG/DL (ref 0.58–1.64)
TSH SERPL DL<=0.05 MIU/L-ACNC: 1.55 MIU/L (ref 0.34–5.6)

## 2020-01-14 PROCEDURE — 84439 ASSAY OF FREE THYROXINE: CPT

## 2020-01-14 PROCEDURE — 36415 COLL VENOUS BLD VENIPUNCTURE: CPT

## 2020-01-14 PROCEDURE — 84443 ASSAY THYROID STIM HORMONE: CPT

## 2020-01-15 ENCOUNTER — TELEPHONE (OUTPATIENT)
Dept: INTERNAL MEDICINE | Facility: CLINIC | Age: 75
End: 2020-01-15

## 2020-01-27 ENCOUNTER — TRANSCRIBE ORDERS (OUTPATIENT)
Dept: OPHTHALMOLOGY | Facility: HOSPITAL | Age: 75
End: 2020-01-27

## 2020-01-27 DIAGNOSIS — H47.093 OTHER DISORDERS OF OPTIC NERVE, NOT ELSEWHERE CLASSIFIED, BILATERAL: ICD-10-CM

## 2020-01-27 DIAGNOSIS — H47.393 OTHER DISORDERS OF OPTIC DISC, BILATERAL: Primary | ICD-10-CM

## 2020-01-28 RX ORDER — LEVOTHYROXINE SODIUM 88 UG/1
TABLET ORAL
Qty: 90 TABLET | Refills: 3 | Status: SHIPPED | OUTPATIENT
Start: 2020-01-28 | End: 2021-01-11 | Stop reason: SDUPTHER

## 2020-01-28 NOTE — TELEPHONE ENCOUNTER
Medicine Refill Request    Last Office Visit: 8/22/2019  Next Office Visit: Visit date not found        Current Outpatient Medications:   •  acetaminophen (TYLENOL) 500 mg tablet, Take 500 mg by mouth every 6 (six) hours as needed for mild pain (Takes PRN only)., Disp: , Rfl:   •  atorvastatin (LIPITOR) 20 mg tablet, TAKE ONE TABLET BY MOUTH ONCE DAILY, Disp: 30 tablet, Rfl: 11  •  denosumab (PROLIA) 60 mg/mL syringe, Inject 60 mg under the skin once. Injection 2 x yearly, Disp: , Rfl:   •  esomeprazole (NexIUM) 40 mg capsule, Take 1 capsule (40 mg total) by mouth daily., Disp: 90 capsule, Rfl: 0  •  levothyroxine (SYNTHROID) 88 mcg tablet, Take 1 tablet (88 mcg total) by mouth daily., Disp: 90 tablet, Rfl: 3      BP Readings from Last 3 Encounters:   08/23/19 132/78   06/06/19 102/78   05/30/19 116/70       Recent Lab results:  Lab Results   Component Value Date    CHOL 129 02/06/2018   ,   Lab Results   Component Value Date    HDL 45 (L) 02/06/2018   ,   Lab Results   Component Value Date    LDLCALC 73 02/06/2018   ,   Lab Results   Component Value Date    TRIG 57 02/06/2018        Lab Results   Component Value Date    GLUCOSE 90 06/03/2019   , No results found for: HGBA1C      Lab Results   Component Value Date    CREATININE 0.8 06/03/2019       Lab Results   Component Value Date    TSH 1.55 01/14/2020

## 2020-03-04 ENCOUNTER — APPOINTMENT (OUTPATIENT)
Dept: LAB | Facility: HOSPITAL | Age: 75
End: 2020-03-04
Attending: INTERNAL MEDICINE
Payer: MEDICARE

## 2020-03-04 ENCOUNTER — TRANSCRIBE ORDERS (OUTPATIENT)
Dept: LAB | Facility: HOSPITAL | Age: 75
End: 2020-03-04

## 2020-03-04 ENCOUNTER — TELEPHONE (OUTPATIENT)
Dept: INTERNAL MEDICINE | Facility: CLINIC | Age: 75
End: 2020-03-04

## 2020-03-04 DIAGNOSIS — M81.0 AGE-RELATED OSTEOPOROSIS WITHOUT CURRENT PATHOLOGICAL FRACTURE: ICD-10-CM

## 2020-03-04 DIAGNOSIS — Z79.83 LONG TERM (CURRENT) USE OF BISPHOSPHONATES: ICD-10-CM

## 2020-03-04 DIAGNOSIS — I45.10 UNSPECIFIED RIGHT BUNDLE-BRANCH BLOCK: ICD-10-CM

## 2020-03-04 DIAGNOSIS — Z79.83 LONG TERM (CURRENT) USE OF BISPHOSPHONATES: Primary | ICD-10-CM

## 2020-03-04 LAB
25(OH)D3 SERPL-MCNC: 57 NG/ML (ref 30–100)
ALBUMIN SERPL-MCNC: 4.2 G/DL (ref 3.4–5)
ALP SERPL-CCNC: 66 IU/L (ref 35–126)
ALT SERPL-CCNC: 20 IU/L (ref 11–54)
ANION GAP SERPL CALC-SCNC: 12 MEQ/L (ref 3–15)
AST SERPL-CCNC: 20 IU/L (ref 15–41)
BILIRUB SERPL-MCNC: 0.6 MG/DL (ref 0.3–1.2)
BUN SERPL-MCNC: 10 MG/DL (ref 8–20)
CALCIUM SERPL-MCNC: 9.4 MG/DL (ref 8.9–10.3)
CHLORIDE SERPL-SCNC: 100 MEQ/L (ref 98–109)
CO2 SERPL-SCNC: 26 MEQ/L (ref 22–32)
CREAT SERPL-MCNC: 0.7 MG/DL (ref 0.6–1.1)
ERYTHROCYTE [DISTWIDTH] IN BLOOD BY AUTOMATED COUNT: 12 % (ref 11.7–14.4)
GFR SERPL CREATININE-BSD FRML MDRD: >60 ML/MIN/1.73M*2
GLUCOSE SERPL-MCNC: 85 MG/DL (ref 70–99)
HCT VFR BLDCO AUTO: 39.1 % (ref 35–45)
HGB BLD-MCNC: 13.4 G/DL (ref 11.8–15.7)
MCH RBC QN AUTO: 32.4 PG (ref 28–33.2)
MCHC RBC AUTO-ENTMCNC: 34.3 G/DL (ref 32.2–35.5)
MCV RBC AUTO: 94.7 FL (ref 83–98)
PDW BLD AUTO: 9.5 FL (ref 9.4–12.3)
PLATELET # BLD AUTO: 208 K/UL (ref 150–369)
POTASSIUM SERPL-SCNC: 4.4 MEQ/L (ref 3.6–5.1)
PROT SERPL-MCNC: 6.3 G/DL (ref 6–8.2)
RBC # BLD AUTO: 4.13 M/UL (ref 3.93–5.22)
SODIUM SERPL-SCNC: 138 MEQ/L (ref 136–144)
WBC # BLD AUTO: 6.16 K/UL (ref 3.8–10.5)

## 2020-03-04 PROCEDURE — 85027 COMPLETE CBC AUTOMATED: CPT

## 2020-03-04 PROCEDURE — 82306 VITAMIN D 25 HYDROXY: CPT

## 2020-03-04 PROCEDURE — 80053 COMPREHEN METABOLIC PANEL: CPT

## 2020-03-04 PROCEDURE — 36415 COLL VENOUS BLD VENIPUNCTURE: CPT

## 2020-03-04 NOTE — TELEPHONE ENCOUNTER
Gave pt instructions; pt states this is a on going thing (constipation)  Pt had prolapse surg last June  Had constipations post surg was put on docusate and metamucil   Then pt was told to take colace 1-2 times daily since last sept  Pt wanted to give you some back ground history

## 2020-03-04 NOTE — TELEPHONE ENCOUNTER
Pt is currently taking Colace stool softer without stimulant. She is asking Dr. Velasquez can she switch to Colace with stimulant or would Dr. Velasquez prefer to prescribe a medication for her constipation.

## 2020-05-04 ENCOUNTER — TELEMEDICINE (OUTPATIENT)
Dept: FAMILY MEDICINE | Facility: CLINIC | Age: 75
End: 2020-05-04
Payer: MEDICARE

## 2020-05-04 DIAGNOSIS — U07.1 COVID-19: Primary | ICD-10-CM

## 2020-05-04 DIAGNOSIS — N99.3 VAGINAL VAULT PROLAPSE, POSTHYSTERECTOMY: ICD-10-CM

## 2020-05-04 PROCEDURE — 99214 OFFICE O/P EST MOD 30 MIN: CPT | Mod: 95,CS | Performed by: INTERNAL MEDICINE

## 2020-05-04 NOTE — ASSESSMENT & PLAN NOTE
We will long discussion regarding coronavirus.  I feel that her going into an environment where her daughter more works in a  home exposed to COVID-19 patients and then comes home in the evening puts her at greater risk.  Her daughter more course does take all precautions however I think it is an added risk to have her mother-in-law in the home.  I explained to the patient that there is a risk to her.  Her son also has a risk being a type I diabetic but this is his nuclear family.  It is unlikely that she would provide greater risk to her son as her exposures are low but that is also a possibility.  I recommend that she not travel to their home to mind the 8-month-old grandchild at this time.

## 2020-05-04 NOTE — PROGRESS NOTES
Verification of Patient Location:  The patient affirms they are currently located in the following state: [place]    Request for Consent:   Video Encounter   Hello, my name is Sugar ELISA Means MD.  Before we proceed, can you please verify your identification by telling me your full name and date of birth?  Can you tell me who is in the room with you?    You and I are about to have a telemedicine check-in or visit because you have requested it.  This is a live video-conference.  I am a real person, speaking to you in real time.  There is no one else with me on the video-conference.  However, when we use ([employer], [employer], etc) it is important for you to know that the video-conference may not be secure or private.  I am not recording this conversation and I am asking you not to record it.  This telemedicine visit will be billed to your health insurance or you, if you are self-insured.  You understand you will be responsible for any copayments or coinsurances that apply to your telemedicine visit.  Before starting our telemedicine visit, I am required to get your consent for this virtual check-in or visit by telemedicine. Do you consent?    Patient Response to Request for Consent:  Yes      Visit Documentation:  Subjective     Patient ID: Katheryn Goncalves is a 74 y.o. female.  1945      HPI   This is a virtual video visit.  Patient has been feeling well overall but has concerns whether she can go visit her son who is a type I diabetic and [place] 8-month-old child.  The son's wife works in a  home it is her family's business.  She does wear protective equipment but does have significant exposures to bodies with COVID-19.  Patient is concerned for her risk of bringing coronavirus into the house including also her daughter-in-law's risk as well as the risks to her personally from oswaldo the virus.  She currently is feeling well denies any cough fever chills, change in bowel habits,  headache, alteration in her sense of taste or smell, or any shortness of breath.  She self distances wears a mask when she food shops and only leaves her home to do her food shopping.  She does live in an apartment building where she is exposed to other people in passing.  Her only other concern today is that she has had some difficulty with a sling had mesh put in by Dr. Faust.  Feels that the mesh has slipped somewhat and will be having a procedure done by Dr. Faust to adjust this.  She has canceled doing this until 2021.  This would be an elective procedure.  Currently does not have any discomfort with urination no abdominal pain fever chills or hematuria.  The following have been reviewed and updated as appropriate in this visit:  Tobacco  Allergies  Meds  Problems  Med Hx  Surg Hx  Fam Hx  Soc Hx        Review of Systems   Negative cough shortness of breath.  Negative for fever chills.  Negative for myalgias fatigue.  Negative change in bowel habits nausea vomiting diarrhea.  Negative for any alteration of her taste or smell.  Negative dysuria urgency or frequency.      Assessment/Plan   Diagnoses and all orders for this visit:    COVID-19 (Primary)  Assessment & Plan:  We will long discussion regarding coronavirus.  I feel that her going into an environment where her daughter more works in a  home exposed to COVID-19 patients and then comes home in the evening puts her at greater risk.  Her daughter more course does take all precautions however I think it is an added risk to have her mother-in-law in the home.  I explained to the patient that there is a risk to her.  Her son also has a risk being a type I diabetic but this is his nuclear family.  It is unlikely that she would provide greater risk to her son as her exposures are low but that is also a possibility.  I recommend that she not travel to their home to mind the 8-month-old grandchild at this time.      Vaginal vault prolapse,  posthysterectomy  Assessment & Plan:  She will follow with Dr. Raquel Faust electively.  Currently is asymptomatic.        Time Spent in Medical Discussion During This Encounter:      Total encounter time, with >50 percent spent counseling/coordinatin minutes

## 2020-05-08 RX ORDER — ATORVASTATIN CALCIUM 20 MG/1
TABLET, FILM COATED ORAL
Qty: 30 TABLET | Refills: 11 | Status: SHIPPED | OUTPATIENT
Start: 2020-05-08 | End: 2021-01-11 | Stop reason: SDUPTHER

## 2020-05-08 NOTE — TELEPHONE ENCOUNTER
Medicine Refill Request    Last Office Visit: 8/22/2019  Last Telemedicine Visit: Visit date not found    Next Office Visit: 6/29/2020  Next Telemedicine Visit: Visit date not found         Current Outpatient Medications:   •  acetaminophen (TYLENOL) 500 mg tablet, Take 500 mg by mouth every 6 (six) hours as needed for mild pain (Takes PRN only)., Disp: , Rfl:   •  atorvastatin (LIPITOR) 20 mg tablet, TAKE ONE TABLET BY MOUTH ONCE DAILY, Disp: 30 tablet, Rfl: 11  •  denosumab (PROLIA) 60 mg/mL syringe, Inject 60 mg under the skin once. Injection 2 x yearly, Disp: , Rfl:   •  esomeprazole (NexIUM) 40 mg capsule, Take 1 capsule (40 mg total) by mouth daily., Disp: 90 capsule, Rfl: 0  •  levothyroxine (SYNTHROID) 88 mcg tablet, TAKE ONE TABLET BY MOUTH DAILY., Disp: 90 tablet, Rfl: 3      BP Readings from Last 3 Encounters:   08/23/19 132/78   06/06/19 102/78   05/30/19 116/70       Recent Lab results:  Lab Results   Component Value Date    CHOL 129 02/06/2018   ,   Lab Results   Component Value Date    HDL 45 (L) 02/06/2018   ,   Lab Results   Component Value Date    LDLCALC 73 02/06/2018   ,   Lab Results   Component Value Date    TRIG 57 02/06/2018        Lab Results   Component Value Date    GLUCOSE 85 03/04/2020   , No results found for: HGBA1C      Lab Results   Component Value Date    CREATININE 0.7 03/04/2020       Lab Results   Component Value Date    TSH 1.55 01/14/2020

## 2020-05-27 ENCOUNTER — TELEPHONE (OUTPATIENT)
Dept: INTERNAL MEDICINE | Facility: CLINIC | Age: 75
End: 2020-05-27

## 2020-05-27 ENCOUNTER — TELEMEDICINE (OUTPATIENT)
Dept: INTERNAL MEDICINE | Facility: CLINIC | Age: 75
End: 2020-05-27
Payer: MEDICARE

## 2020-05-27 DIAGNOSIS — N39.0 RECURRENT UTI: Primary | ICD-10-CM

## 2020-05-27 DIAGNOSIS — U07.1 COVID-19: ICD-10-CM

## 2020-05-27 DIAGNOSIS — N99.3 VAGINAL VAULT PROLAPSE, POSTHYSTERECTOMY: ICD-10-CM

## 2020-05-27 PROCEDURE — 99213 OFFICE O/P EST LOW 20 MIN: CPT | Mod: 95,CS | Performed by: INTERNAL MEDICINE

## 2020-05-27 NOTE — PROGRESS NOTES
Verification of Patient Location:  The patient affirms they are currently located in the following state: [place]    Request for Consent:   Video Encounter   Hello, my name is Sugar PÉREZ Eva Means MD.  Before we proceed, can you please verify your identification by telling me your full name and date of birth?  Can you tell me who is in the room with you?    You and I are about to have a telemedicine check-in or visit because you have requested it.  This is a live video-conference.  I am a real person, speaking to you in real time.  There is no one else with me on the video-conference.  However, when we use ([employer], [employer], etc) it is important for you to know that the video-conference may not be secure or private.  I am not recording this conversation and I am asking you not to record it.  This telemedicine visit will be billed to your health insurance or you, if you are self-insured.  You understand you will be responsible for any copayments or coinsurances that apply to your telemedicine visit.  Before starting our telemedicine visit, I am required to get your consent for this virtual check-in or visit by telemedicine. Do you consent?    Patient Response to Request for Consent:  Yes      Visit Documentation:  Subjective     Patient ID: Katheryn Goncalves is a 74 y.o. female.  1945      HPI   She would like to discuss hiprex to prevent UTI. She has had three over the last 9 months.  Has hx of bladder sling which needs repair - possibly  august. The cost of this medication is prohibitive for her and she is questioning whether she absolutely needs to be on this.   She also has concerns regarding coronavirus risk.Does not want to be near her son who has diabetes mellitus. Her son has a  and his mother in law who go in and out of the home. I feel she should not be exposed to multiple people and would not recommend she baby sit at this time.    The following have been reviewed and  updated as appropriate in this visit:  Tobacco  Allergies  Meds  Problems  Med Hx  Surg Hx  Fam Hx  Soc Hx        Review of Systems negative for all systems      Assessment/Plan   Diagnoses and all orders for this visit:    Recurrent UTI (Primary)  Assessment & Plan:  We will treat urine infections symptoms.  Advised to watch closely for any flank pain fever chills or dysuria.  Hold on Hiprex for now.  She will schedule her surgery with Dr. Govind DANIEL-19  Assessment & Plan:  I feel would be best that she not into her son's home due to multiple health.  Given her age she is at high risk.  Should social distance.      Vaginal vault prolapse, posthysterectomy        Time Spent in Medical Discussion During This Encounter:      15 minutes

## 2020-05-27 NOTE — ASSESSMENT & PLAN NOTE
I feel would be best that she not into her son's home due to multiple health.  Given her age she is at high risk.  Should social distance.

## 2020-05-27 NOTE — ASSESSMENT & PLAN NOTE
We will treat urine infections symptoms.  Advised to watch closely for any flank pain fever chills or dysuria.  Hold on Hiprex for now.  She will schedule her surgery with Dr. Faust

## 2020-07-23 ENCOUNTER — OFFICE VISIT (OUTPATIENT)
Dept: INTERNAL MEDICINE | Facility: CLINIC | Age: 75
End: 2020-07-23
Payer: MEDICARE

## 2020-07-23 VITALS
WEIGHT: 153 LBS | HEART RATE: 56 BPM | RESPIRATION RATE: 18 BRPM | OXYGEN SATURATION: 98 % | TEMPERATURE: 97.7 F | DIASTOLIC BLOOD PRESSURE: 62 MMHG | SYSTOLIC BLOOD PRESSURE: 120 MMHG | BODY MASS INDEX: 25.46 KG/M2

## 2020-07-23 DIAGNOSIS — K22.70 BARRETT'S ESOPHAGUS WITHOUT DYSPLASIA: ICD-10-CM

## 2020-07-23 DIAGNOSIS — M81.0 AGE-RELATED OSTEOPOROSIS WITHOUT CURRENT PATHOLOGICAL FRACTURE: ICD-10-CM

## 2020-07-23 DIAGNOSIS — E03.9 HYPOTHYROIDISM, UNSPECIFIED TYPE: ICD-10-CM

## 2020-07-23 DIAGNOSIS — E53.8 VITAMIN B12 DEFICIENCY: ICD-10-CM

## 2020-07-23 DIAGNOSIS — E78.5 HYPERLIPIDEMIA, UNSPECIFIED HYPERLIPIDEMIA TYPE: Primary | ICD-10-CM

## 2020-07-23 DIAGNOSIS — Z98.890 HISTORY OF SACROCOLPOPEXY: ICD-10-CM

## 2020-07-23 PROBLEM — H47.093 ASYMMETRY OF OPTIC NERVE OF BOTH EYES: Status: ACTIVE | Noted: 2020-07-23

## 2020-07-23 PROBLEM — R14.0 ABDOMINAL BLOATING: Status: ACTIVE | Noted: 2020-07-23

## 2020-07-23 PROBLEM — H40.009 BORDERLINE GLAUCOMA: Status: ACTIVE | Noted: 2020-07-23

## 2020-07-23 PROBLEM — N81.10 VAGINAL PROLAPSE: Status: ACTIVE | Noted: 2019-06-11

## 2020-07-23 PROBLEM — H25.10 NUCLEAR SENILE CATARACT: Status: ACTIVE | Noted: 2020-07-23

## 2020-07-23 PROCEDURE — 99214 OFFICE O/P EST MOD 30 MIN: CPT | Performed by: INTERNAL MEDICINE

## 2020-07-23 ASSESSMENT — ENCOUNTER SYMPTOMS
APPETITE CHANGE: 0
PALPITATIONS: 0
POLYDIPSIA: 0
CONSTIPATION: 1
JOINT SWELLING: 0
HEADACHES: 0
BACK PAIN: 0
FATIGUE: 0
UNEXPECTED WEIGHT CHANGE: 0
FREQUENCY: 0
VOICE CHANGE: 0
DYSURIA: 0
ABDOMINAL PAIN: 1
DIARRHEA: 0
WEAKNESS: 0
DIZZINESS: 0
CARDIOVASCULAR NEGATIVE: 1
SLEEP DISTURBANCE: 0
ENDOCRINE NEGATIVE: 1
NERVOUS/ANXIOUS: 0
MYALGIAS: 0
SHORTNESS OF BREATH: 0
BLOOD IN STOOL: 0
POLYPHAGIA: 0
COLOR CHANGE: 0
CHEST TIGHTNESS: 0
NECK PAIN: 0
WHEEZING: 0
HEMATOLOGIC/LYMPHATIC NEGATIVE: 1
ARTHRALGIAS: 0

## 2020-07-23 NOTE — PROGRESS NOTES
Subjective      Patient ID: Katheryn Goncalves is a 74 y.o. female.    She is here for follow up - has been following with Dr. Raquel Faust - 06/10/2019 she had undergone robotic assisted sacral colpopexy and a retropubic mid urethral sling..  Has continued mid urethral vaginal mesh exposure despite use of estrogen therapy and will be going for surgical repair on Monday 6/27/20/ - She feels overall well.No chest pain , palpitations, shortness of breath or lightheadedness. Has gained weight during the pandemic- today  complains of gaseousness and bloating at times after she eats - Is on nexium for her barretts - will be following with Dr. Talbert for consideration of repeat upper endoscopy and colonoscopy.  She cannot relate this to any particular foods although she does admittedly eat large amounts of grapes which may be contributing.  Recently has had some mild constipation.  Melena or hematochezia no dysuria urgency or frequency.      The following have been reviewed and updated as appropriate in this visit:  Allergies  Meds  Problems       Past Medical History:   Diagnosis Date   • Marshall esophagus    • High cholesterol    • Hypothyroidism    • Osteoporosis      Past Surgical History:   Procedure Laterality Date   • CHOLECYSTECTOMY     • FEMORAL HERNIA REPAIR Right     bowel dusky but OK - no resection   • TOTAL VAGINAL HYSTERECTOMY  1997     Family History   Problem Relation Age of Onset   • Heart disease Biological Father    • Colon cancer Biological Mother      Social History     Socioeconomic History   • Marital status:      Spouse name: Not on file   • Number of children: Not on file   • Years of education: Not on file   • Highest education level: Not on file   Occupational History   • Not on file   Social Needs   • Financial resource strain: Not on file   • Food insecurity:     Worry: Not on file     Inability: Not on file   • Transportation needs:     Medical: Not on file     Non-medical: Not on file    Tobacco Use   • Smoking status: Never Smoker   • Smokeless tobacco: Never Used   Substance and Sexual Activity   • Alcohol use: No   • Drug use: No   • Sexual activity: Never   Lifestyle   • Physical activity:     Days per week: Not on file     Minutes per session: Not on file   • Stress: Not on file   Relationships   • Social connections:     Talks on phone: Not on file     Gets together: Not on file     Attends Zoroastrianism service: Not on file     Active member of club or organization: Not on file     Attends meetings of clubs or organizations: Not on file     Relationship status: Not on file   • Intimate partner violence:     Fear of current or ex partner: Not on file     Emotionally abused: Not on file     Physically abused: Not on file     Forced sexual activity: Not on file   Other Topics Concern   • Not on file   Social History Narrative   • Not on file       Review of Systems   Constitutional: Negative for appetite change, fatigue and unexpected weight change.   HENT: Negative for hearing loss and voice change.    Eyes: Negative for visual disturbance.   Respiratory: Negative for chest tightness, shortness of breath and wheezing.    Cardiovascular: Negative.  Negative for chest pain, palpitations and leg swelling.   Gastrointestinal: Positive for abdominal pain and constipation. Negative for blood in stool and diarrhea.   Endocrine: Negative.  Negative for cold intolerance, heat intolerance, polydipsia, polyphagia and polyuria.   Genitourinary: Negative for dysuria, frequency, pelvic pain, urgency and vaginal pain.   Musculoskeletal: Negative for arthralgias, back pain, gait problem, joint swelling, myalgias and neck pain.   Skin: Negative for color change, pallor and rash.   Neurological: Negative for dizziness, weakness and headaches.   Hematological: Negative.    Psychiatric/Behavioral: Negative for behavioral problems and sleep disturbance. The patient is not nervous/anxious.        No Known  Allergies  Current Outpatient Medications   Medication Sig Dispense Refill   • acetaminophen (TYLENOL) 500 mg tablet Take 500 mg by mouth every 6 (six) hours as needed for mild pain (Takes PRN only).     • atorvastatin (LIPITOR) 20 mg tablet TAKE ONE TABLET BY MOUTH ONCE DAILY 30 tablet 11   • denosumab (PROLIA) 60 mg/mL syringe Inject 60 mg under the skin once. Injection 2 x yearly     • esomeprazole (NexIUM) 40 mg capsule Take 1 capsule (40 mg total) by mouth daily. 90 capsule 0   • levothyroxine (SYNTHROID) 88 mcg tablet TAKE ONE TABLET BY MOUTH DAILY. 90 tablet 3     No current facility-administered medications for this visit.        Objective   Vitals:    07/23/20 1029   BP: 120/62   BP Location: Right upper arm   Patient Position: Sitting   Pulse: (!) 56   Resp: 18   Temp: 36.5 °C (97.7 °F)   TempSrc: Oral   SpO2: 98%   Weight: 69.4 kg (153 lb)       Physical Exam   Constitutional: She is oriented to person, place, and time. She appears well-developed and well-nourished.   HENT:   Head: Normocephalic and atraumatic.   Neck: Normal range of motion. Neck supple. No thyromegaly present.   Cardiovascular: Normal rate, regular rhythm and normal heart sounds.   No murmur heard.  Pulmonary/Chest: Effort normal and breath sounds normal. She has no wheezes. She has no rales.   Abdominal: Soft. Bowel sounds are normal. She exhibits no mass. There is no tenderness.   Musculoskeletal: She exhibits no edema.   Neurological: She is alert and oriented to person, place, and time.   Skin: Skin is warm and dry.   Psychiatric: She has a normal mood and affect. Her behavior is normal. Judgment and thought content normal.   Nursing note and vitals reviewed.      Assessment/Plan   Problem List Items Addressed This Visit        Digestive    Barretts esophagus     Continue with proton pump Nexium at 40 mg daily.  Due for follow-up with Dr. Anyi Talbert         Vitamin B12 deficiency     B12 level as she has been on long-term PPI  therapy         Relevant Orders    Vitamin B12       Musculoskeletal    Age-related osteoporosis without current pathological fracture     Continues with Prolia injection every 6 months next is due in January            Endocrine/Metabolic    Hypothyroidism     Clinically and biochemically euthyroid continue levothyroxine at 88 mcg         Relevant Orders    TSH 3rd Generation    Hyperlipidemia - Primary     Stable on atorvastatin check fasting lipid profile next visit         Relevant Orders    Lipid panel    Comprehensive metabolic panel    TSH 3rd Generation       Other    History of sacrocolpopexy          Sugar Means MD    7/23/2020

## 2020-07-23 NOTE — ASSESSMENT & PLAN NOTE
Possibly related to her diet.  She does eat large amounts of grapes and oatmeal.  We discussed cutting down on that.  Will need follow-up with GI and will be considering colonoscopy with Dr. Talbert when she does her upper endoscopy.  Does have a history of inguinal hernia on prior CT scan 2018.  Advised to call immediately if she has increased abdominal distention and pain which is not relieved. Further work up with gi

## 2020-08-22 ENCOUNTER — APPOINTMENT (OUTPATIENT)
Dept: LAB | Facility: HOSPITAL | Age: 75
End: 2020-08-22
Attending: INTERNAL MEDICINE
Payer: MEDICARE

## 2020-08-22 ENCOUNTER — TRANSCRIBE ORDERS (OUTPATIENT)
Dept: LAB | Facility: HOSPITAL | Age: 75
End: 2020-08-22

## 2020-08-22 DIAGNOSIS — E78.5 HYPERLIPIDEMIA, UNSPECIFIED: ICD-10-CM

## 2020-08-22 DIAGNOSIS — E53.8 VITAMIN B12 DEFICIENCY: ICD-10-CM

## 2020-08-22 DIAGNOSIS — E53.8 DEFICIENCY OF OTHER SPECIFIED B GROUP VITAMINS: ICD-10-CM

## 2020-08-22 DIAGNOSIS — E78.5 HYPERLIPIDEMIA, UNSPECIFIED: Primary | ICD-10-CM

## 2020-08-22 DIAGNOSIS — E78.5 HYPERLIPIDEMIA, UNSPECIFIED HYPERLIPIDEMIA TYPE: ICD-10-CM

## 2020-08-22 DIAGNOSIS — E03.9 HYPOTHYROIDISM, UNSPECIFIED TYPE: ICD-10-CM

## 2020-08-22 LAB
ALBUMIN SERPL-MCNC: 4 G/DL (ref 3.4–5)
ALP SERPL-CCNC: 60 IU/L (ref 35–126)
ALT SERPL-CCNC: 17 IU/L (ref 11–54)
ANION GAP SERPL CALC-SCNC: 10 MEQ/L (ref 3–15)
AST SERPL-CCNC: 20 IU/L (ref 15–41)
BILIRUB SERPL-MCNC: 0.7 MG/DL (ref 0.3–1.2)
BUN SERPL-MCNC: 8 MG/DL (ref 8–20)
CALCIUM SERPL-MCNC: 9.5 MG/DL (ref 8.9–10.3)
CHLORIDE SERPL-SCNC: 103 MEQ/L (ref 98–109)
CO2 SERPL-SCNC: 25 MEQ/L (ref 22–32)
CREAT SERPL-MCNC: 0.6 MG/DL (ref 0.6–1.1)
GFR SERPL CREATININE-BSD FRML MDRD: >60 ML/MIN/1.73M*2
GLUCOSE SERPL-MCNC: 90 MG/DL (ref 70–99)
POTASSIUM SERPL-SCNC: 4.4 MEQ/L (ref 3.6–5.1)
PROT SERPL-MCNC: 6 G/DL (ref 6–8.2)
SODIUM SERPL-SCNC: 138 MEQ/L (ref 136–144)
T3 SERPL-MCNC: 123 NG/DL (ref 87–178)
TSH SERPL DL<=0.05 MIU/L-ACNC: 1.74 MIU/L (ref 0.34–5.6)
VIT B12 SERPL-MCNC: 250 PG/ML (ref 180–914)

## 2020-08-22 PROCEDURE — 80053 COMPREHEN METABOLIC PANEL: CPT

## 2020-08-22 PROCEDURE — 84443 ASSAY THYROID STIM HORMONE: CPT

## 2020-08-22 PROCEDURE — 82607 VITAMIN B-12: CPT

## 2020-08-22 PROCEDURE — 84480 ASSAY TRIIODOTHYRONINE (T3): CPT

## 2020-08-22 PROCEDURE — 36415 COLL VENOUS BLD VENIPUNCTURE: CPT

## 2020-09-21 ENCOUNTER — TRANSCRIBE ORDERS (OUTPATIENT)
Dept: OPHTHALMOLOGY | Facility: HOSPITAL | Age: 75
End: 2020-09-21

## 2020-09-21 ENCOUNTER — APPOINTMENT (OUTPATIENT)
Dept: OPHTHALMOLOGY | Facility: HOSPITAL | Age: 75
End: 2020-09-21
Attending: OPHTHALMOLOGY
Payer: MEDICARE

## 2020-09-21 DIAGNOSIS — H47.093 OTHER DISORDERS OF OPTIC NERVE, NOT ELSEWHERE CLASSIFIED, BILATERAL: Primary | ICD-10-CM

## 2020-09-21 DIAGNOSIS — H47.393 OTHER DISORDERS OF OPTIC DISC, BILATERAL: ICD-10-CM

## 2020-09-21 PROCEDURE — 92133 CPTRZD OPH DX IMG PST SGM ON: CPT

## 2020-10-02 ENCOUNTER — APPOINTMENT (OUTPATIENT)
Dept: LAB | Facility: CLINIC | Age: 75
End: 2020-10-02
Attending: INTERNAL MEDICINE
Payer: MEDICARE

## 2020-10-02 LAB
CHOLEST SERPL-MCNC: 161 MG/DL
HDLC SERPL-MCNC: 56 MG/DL
HDLC SERPL: 2.9 {RATIO}
LDLC SERPL CALC-MCNC: 96 MG/DL
NONHDLC SERPL-MCNC: 105 MG/DL
TRIGL SERPL-MCNC: 44 MG/DL (ref 30–149)

## 2020-10-02 PROCEDURE — 36415 COLL VENOUS BLD VENIPUNCTURE: CPT

## 2020-10-02 PROCEDURE — 80061 LIPID PANEL: CPT

## 2020-10-09 ENCOUNTER — TELEPHONE (OUTPATIENT)
Dept: INTERNAL MEDICINE | Facility: CLINIC | Age: 75
End: 2020-10-09

## 2020-10-09 NOTE — TELEPHONE ENCOUNTER
Patient wanted to know based on her lab results is she still taking the right medicine. Patient refused telemed appointment. She can be left known via phone or when she comes in for her flu shot 10/13.

## 2020-10-13 ENCOUNTER — CLINICAL SUPPORT (OUTPATIENT)
Dept: INTERNAL MEDICINE | Facility: CLINIC | Age: 75
End: 2020-10-13
Payer: MEDICARE

## 2020-10-13 DIAGNOSIS — Z23 NEED FOR INFLUENZA VACCINATION: Primary | ICD-10-CM

## 2020-10-13 PROCEDURE — 90694 VACC AIIV4 NO PRSRV 0.5ML IM: CPT | Performed by: INTERNAL MEDICINE

## 2020-10-13 PROCEDURE — G0008 ADMIN INFLUENZA VIRUS VAC: HCPCS | Performed by: INTERNAL MEDICINE

## 2020-11-16 ENCOUNTER — TELEMEDICINE (OUTPATIENT)
Dept: INTERNAL MEDICINE | Facility: CLINIC | Age: 75
End: 2020-11-16
Payer: MEDICARE

## 2020-11-16 DIAGNOSIS — R07.81 RIB PAIN ON LEFT SIDE: Primary | ICD-10-CM

## 2020-11-16 DIAGNOSIS — E78.5 DYSLIPIDEMIA: ICD-10-CM

## 2020-11-16 PROCEDURE — 99442 PR PHYS/QHP TELEPHONE EVALUATION 11-20 MIN: CPT | Mod: 95 | Performed by: NURSE PRACTITIONER

## 2020-11-16 NOTE — PROGRESS NOTES
Verification of Patient Location:  The patient affirms they are currently located in the following state:[place]     Request for Consent:  You and I are about to have a telemedicine check-in or visit. This is allowed because you are already my patient, and you have requested it.  This telemedicine visit will be billed to your health insurance or you, if you are self-insured.  You understand you will be responsible for any copayments or coinsurances that apply to your telemedicine visit.  Before starting our telemedicine visit, I am required to get your consent for this virtual check-in or visit by telemedicine. Do you consent?      Patient Response to Request for Consent: Yes    The following have been reviewed and updated as appropriate in this visit:  Allergies  Meds  Problems         Visit Documentation:  75-year-old female with a past medical history of hypothyroidism presents for telemedicine phone call today.  Patient reports over the weekend she was eating some pizza and accidentally choked on a piece of it.  Her son had to perform the Heimlich maneuver which did dislodge the piece of pizza and she felt much better afterwards.  Denies fever, chills, shortness of breath, dyspneic on exertion, chest pain, abdominal pain, nausea vomiting.  Hurts with movement especially when changing from a lying position.  Patient been taking acetaminophen as needed with some relief.  I advised her that she may alternate between acetaminophen and ibuprofen.  Advised to eat with ibuprofen.  Advised her to cough and deep breathe every 1-2 hours.  Advised her to take a pillow to brace the area prior to coughing and deep breathing to help her.  Advised her to monitor for fever, shortness of breath, increasing in pain and if she should develop those to call the office.  Patient verbalized understanding.  Reviewed her recent lipid profile which shows that her LDL is at goal.  Advised her to continue atorvastatin as  prescribed.      Time Spent in Medical Discussion During This Encounter:  13 minutes

## 2020-11-19 ENCOUNTER — APPOINTMENT (OUTPATIENT)
Dept: LAB | Facility: CLINIC | Age: 75
End: 2020-11-19
Attending: NURSE PRACTITIONER
Payer: MEDICARE

## 2020-11-19 ENCOUNTER — TELEPHONE (OUTPATIENT)
Dept: INTERNAL MEDICINE | Facility: CLINIC | Age: 75
End: 2020-11-19

## 2020-11-19 ENCOUNTER — HOSPITAL ENCOUNTER (OUTPATIENT)
Dept: RADIOLOGY | Facility: CLINIC | Age: 75
Discharge: HOME | End: 2020-11-19
Attending: NURSE PRACTITIONER
Payer: MEDICARE

## 2020-11-19 ENCOUNTER — TELEMEDICINE (OUTPATIENT)
Dept: INTERNAL MEDICINE | Facility: CLINIC | Age: 75
End: 2020-11-19
Payer: MEDICARE

## 2020-11-19 DIAGNOSIS — R10.12 LUQ DISCOMFORT: ICD-10-CM

## 2020-11-19 DIAGNOSIS — R07.81 RIB PAIN ON LEFT SIDE: Primary | ICD-10-CM

## 2020-11-19 DIAGNOSIS — R07.81 RIB PAIN ON LEFT SIDE: ICD-10-CM

## 2020-11-19 LAB
ALBUMIN SERPL-MCNC: 4.4 G/DL (ref 3.4–5)
ALP SERPL-CCNC: 69 IU/L (ref 35–126)
ALT SERPL-CCNC: 24 IU/L (ref 11–54)
ANION GAP SERPL CALC-SCNC: 11 MEQ/L (ref 3–15)
AST SERPL-CCNC: 27 IU/L (ref 15–41)
BASOPHILS # BLD: 0.05 K/UL (ref 0.01–0.1)
BASOPHILS NFR BLD: 0.7 %
BILIRUB SERPL-MCNC: 0.5 MG/DL (ref 0.3–1.2)
BUN SERPL-MCNC: 8 MG/DL (ref 8–20)
CALCIUM SERPL-MCNC: 9.5 MG/DL (ref 8.9–10.3)
CHLORIDE SERPL-SCNC: 97 MEQ/L (ref 98–109)
CO2 SERPL-SCNC: 27 MEQ/L (ref 22–32)
CREAT SERPL-MCNC: 0.7 MG/DL (ref 0.6–1.1)
DIFFERENTIAL METHOD BLD: ABNORMAL
EOSINOPHIL # BLD: 0.19 K/UL (ref 0.04–0.36)
EOSINOPHIL NFR BLD: 2.6 %
ERYTHROCYTE [DISTWIDTH] IN BLOOD BY AUTOMATED COUNT: 11.9 % (ref 11.7–14.4)
GFR SERPL CREATININE-BSD FRML MDRD: >60 ML/MIN/1.73M*2
GLUCOSE SERPL-MCNC: 113 MG/DL (ref 70–99)
HCT VFR BLDCO AUTO: 40.1 % (ref 35–45)
HGB BLD-MCNC: 13.7 G/DL (ref 11.8–15.7)
IMM GRANULOCYTES # BLD AUTO: 0.02 K/UL (ref 0–0.08)
IMM GRANULOCYTES NFR BLD AUTO: 0.3 %
LIPASE SERPL-CCNC: 29 U/L (ref 20–51)
LYMPHOCYTES # BLD: 2.21 K/UL (ref 1.2–3.5)
LYMPHOCYTES NFR BLD: 30.3 %
MCH RBC QN AUTO: 32.5 PG (ref 28–33.2)
MCHC RBC AUTO-ENTMCNC: 34.2 G/DL (ref 32.2–35.5)
MCV RBC AUTO: 95.2 FL (ref 83–98)
MONOCYTES # BLD: 0.42 K/UL (ref 0.28–0.8)
MONOCYTES NFR BLD: 5.8 %
NEUTROPHILS # BLD: 4.41 K/UL (ref 1.7–7)
NEUTS SEG NFR BLD: 60.3 %
NRBC BLD-RTO: 0 %
PDW BLD AUTO: 9.3 FL (ref 9.4–12.3)
PLATELET # BLD AUTO: 237 K/UL (ref 150–369)
POTASSIUM SERPL-SCNC: 3.7 MEQ/L (ref 3.6–5.1)
PROT SERPL-MCNC: 6.2 G/DL (ref 6–8.2)
RBC # BLD AUTO: 4.21 M/UL (ref 3.93–5.22)
SODIUM SERPL-SCNC: 135 MEQ/L (ref 136–144)
WBC # BLD AUTO: 7.3 K/UL (ref 3.8–10.5)

## 2020-11-19 PROCEDURE — 71101 X-RAY EXAM UNILAT RIBS/CHEST: CPT | Mod: LT

## 2020-11-19 PROCEDURE — 85025 COMPLETE CBC W/AUTO DIFF WBC: CPT

## 2020-11-19 PROCEDURE — 36415 COLL VENOUS BLD VENIPUNCTURE: CPT

## 2020-11-19 PROCEDURE — 99442 PR PHYS/QHP TELEPHONE EVALUATION 11-20 MIN: CPT | Mod: 95 | Performed by: NURSE PRACTITIONER

## 2020-11-19 PROCEDURE — 83690 ASSAY OF LIPASE: CPT

## 2020-11-19 PROCEDURE — 80053 COMPREHEN METABOLIC PANEL: CPT

## 2020-11-19 NOTE — TELEPHONE ENCOUNTER
Since have the margauxlich preformed on her the patient has now noticed a change in the color of her bowel movements, no blood and is having pain in her upper left side near her ribs, no SOB...does not want to schedule with CRNP.

## 2020-11-19 NOTE — TELEPHONE ENCOUNTER
Since have the marguaxlich preformed on her the patient has now noticed a change in the color of her bowel movements, no blood and is having pain in her upper left side near her ribs, no SOB...does not want to schedule with CRNP.

## 2020-11-19 NOTE — PROGRESS NOTES
Verification of Patient Location:  The patient affirms they are currently located in the following state:[place]     Request for Consent:  You and I are about to have a telemedicine check-in or visit. This is allowed because you are already my patient, and you have requested it.  This telemedicine visit will be billed to your health insurance or you, if you are self-insured.  You understand you will be responsible for any copayments or coinsurances that apply to your telemedicine visit.  Before starting our telemedicine visit, I am required to get your consent for this virtual check-in or visit by telemedicine. Do you consent?      Patient Response to Request for Consent: Yes    The following have been reviewed and updated as appropriate in this visit:  Meds         Visit Documentation:  75-year-old female with a past medical history of GERD, hyperlipidemia, hypothyroidism presents for a time telephone call today.  Patient had a telemed earlier in the week regarding her left rib pain after having the Heimlich maneuver performed on her about a week ago..  Patient reports left rib pain persists and is concerned that there is something developing underneath the left rib  to cause her pain.  She denies any fever, chills, shortness of breath, chest pain, lightheadedness, dizziness, nausea, vomiting, black or bloody stool, bloody urine, cough, dyspneic on exertion.  She reports her stool looks little bit more musty than normal but is still able to defecate without issue.  She does try to cough and deep breathe as discussed at previous visit.  Will send patient for rib series and will also check some blood work due to her persistent pain.  Patient reports she is not having abdominal pain.  May have acetaminophen as needed for pain.        Time Spent in Medical Discussion During This Encounter:  11 minutes

## 2020-11-20 NOTE — TELEPHONE ENCOUNTER
I called patient reviewed recent lab results.  Rib series negative for rib fracture.  Lab work essentially normal.  Pain likely from contusion status post Heimlich maneuver.  Patient advised to continue to cough and deep breathe.  Patient aware of concerning symptoms to monitor for.

## 2020-12-29 ENCOUNTER — TRANSCRIBE ORDERS (OUTPATIENT)
Dept: SCHEDULING | Age: 75
End: 2020-12-29

## 2020-12-29 DIAGNOSIS — I45.10 UNSPECIFIED RIGHT BUNDLE-BRANCH BLOCK: ICD-10-CM

## 2020-12-29 DIAGNOSIS — M81.0 AGE-RELATED OSTEOPOROSIS WITHOUT CURRENT PATHOLOGICAL FRACTURE: ICD-10-CM

## 2020-12-29 DIAGNOSIS — Z79.83 LONG TERM (CURRENT) USE OF BISPHOSPHONATES: Primary | ICD-10-CM

## 2020-12-29 DIAGNOSIS — E55.9 VITAMIN D DEFICIENCY, UNSPECIFIED: ICD-10-CM

## 2021-01-11 RX ORDER — LEVOTHYROXINE SODIUM 88 UG/1
88 TABLET ORAL
Qty: 90 TABLET | Refills: 3 | Status: SHIPPED | OUTPATIENT
Start: 2021-01-11 | End: 2022-01-24

## 2021-01-11 RX ORDER — ATORVASTATIN CALCIUM 20 MG/1
20 TABLET, FILM COATED ORAL
Qty: 90 TABLET | Refills: 3 | Status: SHIPPED | OUTPATIENT
Start: 2021-01-11 | End: 2022-02-15

## 2021-01-11 NOTE — TELEPHONE ENCOUNTER
Medicine Refill Request    Last Office Visit: 7/23/2020  Last Telemedicine Visit: 11/19/2020 Isrrael Piña CRNP    Next Office Visit: Visit date not found  Next Telemedicine Visit: Visit date not found         Current Outpatient Medications:   •  acetaminophen (TYLENOL) 500 mg tablet, Take 500 mg by mouth every 6 (six) hours as needed for mild pain (Takes PRN only)., Disp: , Rfl:   •  atorvastatin (LIPITOR) 20 mg tablet, TAKE ONE TABLET BY MOUTH ONCE DAILY, Disp: 30 tablet, Rfl: 11  •  denosumab (PROLIA) 60 mg/mL syringe, Inject 60 mg under the skin once. Injection 2 x yearly, Disp: , Rfl:   •  esomeprazole (NexIUM) 40 mg capsule, Take 1 capsule (40 mg total) by mouth daily., Disp: 90 capsule, Rfl: 0  •  levothyroxine (SYNTHROID) 88 mcg tablet, TAKE ONE TABLET BY MOUTH DAILY., Disp: 90 tablet, Rfl: 3      BP Readings from Last 3 Encounters:   07/23/20 120/62   08/23/19 132/78   06/06/19 102/78       Recent Lab results:  Lab Results   Component Value Date    CHOL 161 10/02/2020   ,   Lab Results   Component Value Date    HDL 56 10/02/2020   ,   Lab Results   Component Value Date    LDLCALC 96 10/02/2020   ,   Lab Results   Component Value Date    TRIG 44 10/02/2020        Lab Results   Component Value Date    GLUCOSE 113 (H) 11/19/2020   , No results found for: HGBA1C      Lab Results   Component Value Date    CREATININE 0.7 11/19/2020       Lab Results   Component Value Date    TSH 1.74 08/22/2020

## 2021-02-04 ENCOUNTER — IMMUNIZATIONS (OUTPATIENT)
Dept: FAMILY MEDICINE CLINIC | Facility: HOSPITAL | Age: 76
End: 2021-02-04

## 2021-02-04 DIAGNOSIS — Z23 ENCOUNTER FOR IMMUNIZATION: Primary | ICD-10-CM

## 2021-02-04 PROCEDURE — 91301 SARS-COV-2 / COVID-19 MRNA VACCINE (MODERNA) 100 MCG: CPT

## 2021-02-04 PROCEDURE — 0011A SARS-COV-2 / COVID-19 MRNA VACCINE (MODERNA) 100 MCG: CPT

## 2021-03-04 ENCOUNTER — IMMUNIZATIONS (OUTPATIENT)
Dept: FAMILY MEDICINE CLINIC | Facility: HOSPITAL | Age: 76
End: 2021-03-04

## 2021-03-04 DIAGNOSIS — Z23 ENCOUNTER FOR IMMUNIZATION: Primary | ICD-10-CM

## 2021-03-04 PROCEDURE — 0012A SARS-COV-2 / COVID-19 MRNA VACCINE (MODERNA) 100 MCG: CPT

## 2021-03-04 PROCEDURE — 91301 SARS-COV-2 / COVID-19 MRNA VACCINE (MODERNA) 100 MCG: CPT

## 2021-03-10 ENCOUNTER — TRANSCRIBE ORDERS (OUTPATIENT)
Dept: LAB | Facility: CLINIC | Age: 76
End: 2021-03-10

## 2021-03-10 ENCOUNTER — APPOINTMENT (OUTPATIENT)
Dept: LAB | Facility: CLINIC | Age: 76
End: 2021-03-10
Attending: NURSE PRACTITIONER
Payer: MEDICARE

## 2021-03-10 ENCOUNTER — HOSPITAL ENCOUNTER (OUTPATIENT)
Dept: RADIOLOGY | Facility: CLINIC | Age: 76
Discharge: HOME | End: 2021-03-10
Attending: NURSE PRACTITIONER
Payer: MEDICARE

## 2021-03-10 DIAGNOSIS — M81.0 AGE-RELATED OSTEOPOROSIS WITHOUT CURRENT PATHOLOGICAL FRACTURE: ICD-10-CM

## 2021-03-10 DIAGNOSIS — E55.9 VITAMIN D DEFICIENCY, UNSPECIFIED: ICD-10-CM

## 2021-03-10 DIAGNOSIS — I45.10 UNSPECIFIED RIGHT BUNDLE-BRANCH BLOCK: ICD-10-CM

## 2021-03-10 DIAGNOSIS — Z79.83 LONG TERM (CURRENT) USE OF BISPHOSPHONATES: ICD-10-CM

## 2021-03-10 DIAGNOSIS — Z79.83 LONG TERM (CURRENT) USE OF BISPHOSPHONATES: Primary | ICD-10-CM

## 2021-03-10 LAB
25(OH)D3 SERPL-MCNC: 68 NG/ML (ref 30–100)
ALBUMIN SERPL-MCNC: 3.9 G/DL (ref 3.4–5)
ALP SERPL-CCNC: 71 IU/L (ref 35–126)
ALT SERPL-CCNC: 23 IU/L (ref 11–54)
ANION GAP SERPL CALC-SCNC: 9 MEQ/L (ref 3–15)
AST SERPL-CCNC: 20 IU/L (ref 15–41)
BILIRUB SERPL-MCNC: 0.9 MG/DL (ref 0.3–1.2)
BUN SERPL-MCNC: 9 MG/DL (ref 8–20)
CALCIUM SERPL-MCNC: 9.5 MG/DL (ref 8.9–10.3)
CHLORIDE SERPL-SCNC: 102 MEQ/L (ref 98–109)
CO2 SERPL-SCNC: 26 MEQ/L (ref 22–32)
CREAT SERPL-MCNC: 0.7 MG/DL (ref 0.6–1.1)
GFR SERPL CREATININE-BSD FRML MDRD: >60 ML/MIN/1.73M*2
GLUCOSE SERPL-MCNC: 78 MG/DL (ref 70–99)
POTASSIUM SERPL-SCNC: 5.4 MEQ/L (ref 3.6–5.1)
PROT SERPL-MCNC: 5.9 G/DL (ref 6–8.2)
SODIUM SERPL-SCNC: 137 MEQ/L (ref 136–144)

## 2021-03-10 PROCEDURE — 80053 COMPREHEN METABOLIC PANEL: CPT

## 2021-03-10 PROCEDURE — 77081 DXA BONE DENSITY APPENDICULR: CPT

## 2021-03-10 PROCEDURE — 36415 COLL VENOUS BLD VENIPUNCTURE: CPT

## 2021-03-10 PROCEDURE — 77080 DXA BONE DENSITY AXIAL: CPT

## 2021-03-10 PROCEDURE — 82306 VITAMIN D 25 HYDROXY: CPT

## 2021-03-11 ENCOUNTER — OFFICE VISIT (OUTPATIENT)
Dept: INTERNAL MEDICINE | Facility: CLINIC | Age: 76
End: 2021-03-11
Payer: MEDICARE

## 2021-03-11 VITALS
HEART RATE: 65 BPM | BODY MASS INDEX: 24.66 KG/M2 | TEMPERATURE: 98.6 F | WEIGHT: 148 LBS | DIASTOLIC BLOOD PRESSURE: 82 MMHG | SYSTOLIC BLOOD PRESSURE: 130 MMHG | HEIGHT: 65 IN | OXYGEN SATURATION: 98 %

## 2021-03-11 DIAGNOSIS — E03.9 HYPOTHYROIDISM, UNSPECIFIED TYPE: ICD-10-CM

## 2021-03-11 DIAGNOSIS — R42 VERTIGO: Primary | ICD-10-CM

## 2021-03-11 DIAGNOSIS — H93.11 TINNITUS OF RIGHT EAR: ICD-10-CM

## 2021-03-11 DIAGNOSIS — E78.5 HYPERLIPIDEMIA, UNSPECIFIED HYPERLIPIDEMIA TYPE: ICD-10-CM

## 2021-03-11 PROCEDURE — 99214 OFFICE O/P EST MOD 30 MIN: CPT | Performed by: NURSE PRACTITIONER

## 2021-03-11 RX ORDER — MECLIZINE HCL 25MG 25 MG/1
25 TABLET, CHEWABLE ORAL 3 TIMES DAILY PRN
Qty: 30 TABLET | Refills: 0 | Status: SHIPPED | OUTPATIENT
Start: 2021-03-11 | End: 2021-05-11 | Stop reason: ALTCHOICE

## 2021-03-11 RX ORDER — METHYLPREDNISOLONE 4 MG/1
TABLET ORAL
Qty: 21 TABLET | Refills: 0 | Status: SHIPPED | OUTPATIENT
Start: 2021-03-11 | End: 2021-03-18

## 2021-03-11 ASSESSMENT — ENCOUNTER SYMPTOMS
DIARRHEA: 0
EYE PAIN: 0
SINUS PRESSURE: 0
COUGH: 0
CHILLS: 0
PALPITATIONS: 0
FACIAL ASYMMETRY: 0
SORE THROAT: 0
FATIGUE: 1
SPEECH DIFFICULTY: 0
HEADACHES: 0
DIZZINESS: 0
DYSURIA: 0
WEAKNESS: 0
LIGHT-HEADEDNESS: 0
SINUS PAIN: 0
FEVER: 0
TROUBLE SWALLOWING: 0
ABDOMINAL PAIN: 0
NAUSEA: 0
ARTHRALGIAS: 0
ADENOPATHY: 0
SHORTNESS OF BREATH: 0
VOMITING: 0
MYALGIAS: 0

## 2021-03-11 NOTE — PATIENT INSTRUCTIONS
Dr. Ramesh Ladd at [facility]    Dr. Gonzalez--[facility]    Dr. Oren Mancia

## 2021-03-11 NOTE — PROGRESS NOTES
Subjective      Patient ID: Katheryn Goncalves is a 75 y.o. female.    75-year-old female with a past medical history of hypothyroidism, osteoporosis, hyperlipidemia presents for an in office visit today.  Patient reports she developed sudden onset of a swishing sound in her right ear.  She said it sounds like she is hearing water swishing back-and-forth.  She does have a history of hearing loss in that ear and it was recommended she have a hearing aid but she does not have one.  She denies any pain nor any discharge from the ear.  Denies any recent upper URI.  Does have a history of seasonal allergies.  Reports associated of feeling unbalanced with walking.  She is fine if she sits down.  She reports she feels like she is drunk walking.  Denies any slurred speech, blurred vision, facial droop, headache, visual changes, focal deficit.  She also admits to feeling tired      The following have been reviewed and updated as appropriate in this visit:    Tobacco         Past Medical History:   Diagnosis Date   • Marshall esophagus    • High cholesterol    • Hypothyroidism    • Osteoporosis      Past Surgical History:   Procedure Laterality Date   • CHOLECYSTECTOMY     • FEMORAL HERNIA REPAIR Right     bowel dusky but OK - no resection   • TOTAL VAGINAL HYSTERECTOMY  1997     Family History   Problem Relation Age of Onset   • Heart disease Biological Father    • Colon cancer Biological Mother      Social History     Tobacco Use   • Smoking status: Never Smoker   • Smokeless tobacco: Never Used   Substance Use Topics   • Alcohol use: No   • Drug use: No       Review of Systems   Constitutional: Positive for fatigue. Negative for chills and fever.   HENT: Positive for tinnitus. Negative for dental problem, ear discharge, ear pain, sinus pressure, sinus pain, sore throat and trouble swallowing.    Eyes: Negative for pain.   Respiratory: Negative for cough and shortness of breath.    Cardiovascular: Negative for chest pain and  "palpitations.   Gastrointestinal: Negative for abdominal pain, diarrhea, nausea and vomiting.   Genitourinary: Negative for dysuria.   Musculoskeletal: Negative for arthralgias and myalgias.   Skin: Negative for rash.   Neurological: Negative for dizziness, facial asymmetry, speech difficulty, weakness, light-headedness and headaches.        Feels unbalanced with walking.  She reports she is able to turn her head without issue.   Hematological: Negative for adenopathy.       No Known Allergies  Current Outpatient Medications   Medication Sig Dispense Refill   • acetaminophen (TYLENOL) 500 mg tablet Take 500 mg by mouth every 6 (six) hours as needed for mild pain (Takes PRN only).     • atorvastatin (LIPITOR) 20 mg tablet Take 1 tablet (20 mg total) by mouth once daily. 90 tablet 3   • denosumab (PROLIA) 60 mg/mL syringe Inject 60 mg under the skin once. Injection 2 x yearly     • esomeprazole (NexIUM) 40 mg capsule Take 1 capsule (40 mg total) by mouth daily. 90 capsule 0   • levothyroxine (SYNTHROID) 88 mcg tablet Take 1 tablet (88 mcg total) by mouth once daily. 90 tablet 3   • meclizine (ANTIVERT) 25 mg tablet Take 1 tablet (25 mg total) by mouth 3 (three) times a day as needed for dizziness for up to 10 days. 30 tablet 0   • methylPREDNISolone (MEDROL DOSEPACK) 4 mg tablet Follow package directions. 21 tablet 0     No current facility-administered medications for this visit.        Objective   Vitals:    03/11/21 1542   BP: 130/82   Pulse: 65   Temp: 37 °C (98.6 °F)   SpO2: 98%   Weight: 67.1 kg (148 lb)   Height: 1.651 m (5' 5\")       Physical Exam  Vitals signs reviewed.   Constitutional:       General: She is not in acute distress.     Appearance: Normal appearance.   HENT:      Head: Normocephalic and atraumatic.      Right Ear: Tympanic membrane, ear canal and external ear normal.      Left Ear: Tympanic membrane, ear canal and external ear normal.      Nose: Nose normal.   Eyes:      General: No scleral " icterus.     Extraocular Movements: Extraocular movements intact.      Conjunctiva/sclera: Conjunctivae normal.      Pupils: Pupils are equal, round, and reactive to light.   Neck:      Musculoskeletal: Normal range of motion and neck supple.      Vascular: No carotid bruit.   Cardiovascular:      Rate and Rhythm: Normal rate and regular rhythm.      Heart sounds: Normal heart sounds.   Pulmonary:      Effort: Pulmonary effort is normal.      Breath sounds: Normal breath sounds.   Musculoskeletal: Normal range of motion.   Skin:     General: Skin is warm and dry.      Capillary Refill: Capillary refill takes less than 2 seconds.   Neurological:      General: No focal deficit present.      Mental Status: She is alert and oriented to person, place, and time.      Cranial Nerves: No cranial nerve deficit.      Sensory: No sensory deficit.      Motor: No weakness.      Gait: Gait normal.   Psychiatric:         Mood and Affect: Mood normal.         Behavior: Behavior normal.         Thought Content: Thought content normal.         Judgment: Judgment normal.         Assessment/Plan   Problem List Items Addressed This Visit        Nervous    Tinnitus of right ear     Patient with already noted hearing loss in right ear.  Could be worsening of hearing loss.  Will start steroid pack.  Chose Medrol dose due to history of osteoporosis and not on Prolia.  Patient advised to follow with ENT for further evaluation.         Relevant Orders    CBC and differential    Comprehensive metabolic panel    Lipid panel    TSH    T4, free       Endocrine/Metabolic    Hypothyroidism     Patient currently on levothyroxine.  Will check TSH and free T4.         Relevant Medications    methylPREDNISolone (MEDROL DOSEPACK) 4 mg tablet    Other Relevant Orders    CBC and differential    Comprehensive metabolic panel    Lipid panel    TSH    T4, free    Hyperlipidemia     Patient due for fasting lipid profile.  She is on atorvastatin.  Patient is  patient given prescription         Relevant Orders    CBC and differential    Comprehensive metabolic panel    Lipid panel    TSH    T4, free       Other    Vertigo - Primary     Likely vertigo.  Exam nonfocal.  Will start course of meclizine 25 mg by mouth every 8 hours as needed.  Patient advised to increase fluids, rest.  Did advise patient to follow-up with ENT for further evaluation especially if it persists.  Patient given ER precautions if she develops slurred speech, facial droop, weakness in extremities or any other symptoms concerning to her.         Relevant Orders    CBC and differential    Comprehensive metabolic panel    Lipid panel    TSH    T4, free          NOE Araiza    3/11/2021

## 2021-03-12 NOTE — ASSESSMENT & PLAN NOTE
Patient with already noted hearing loss in right ear.  Could be worsening of hearing loss.  Will start steroid pack.  Chose Medrol dose due to history of osteoporosis and not on Prolia.  Patient advised to follow with ENT for further evaluation.

## 2021-03-12 NOTE — ASSESSMENT & PLAN NOTE
Patient due for fasting lipid profile.  She is on atorvastatin.  Patient is patient given prescription

## 2021-03-12 NOTE — ASSESSMENT & PLAN NOTE
Likely vertigo.  Exam nonfocal.  Will start course of meclizine 25 mg by mouth every 8 hours as needed.  Patient advised to increase fluids, rest.  Did advise patient to follow-up with ENT for further evaluation especially if it persists.  Patient given ER precautions if she develops slurred speech, facial droop, weakness in extremities or any other symptoms concerning to her.

## 2021-03-16 ENCOUNTER — TRANSCRIBE ORDERS (OUTPATIENT)
Dept: SCHEDULING | Age: 76
End: 2021-03-16

## 2021-03-16 DIAGNOSIS — H90.41 SENSORINEURAL HEARING LOSS, UNILATERAL, RIGHT EAR, WITH UNRESTRICTED HEARING ON THE CONTRALATERAL SIDE: Primary | ICD-10-CM

## 2021-03-18 ENCOUNTER — TELEMEDICINE (OUTPATIENT)
Dept: INTERNAL MEDICINE | Facility: CLINIC | Age: 76
End: 2021-03-18
Payer: MEDICARE

## 2021-03-18 VITALS — SYSTOLIC BLOOD PRESSURE: 130 MMHG | DIASTOLIC BLOOD PRESSURE: 62 MMHG | WEIGHT: 148 LBS | BODY MASS INDEX: 24.63 KG/M2

## 2021-03-18 DIAGNOSIS — E78.5 DYSLIPIDEMIA: ICD-10-CM

## 2021-03-18 DIAGNOSIS — H93.11 TINNITUS OF RIGHT EAR: ICD-10-CM

## 2021-03-18 DIAGNOSIS — E03.9 HYPOTHYROIDISM, UNSPECIFIED TYPE: ICD-10-CM

## 2021-03-18 DIAGNOSIS — M81.0 AGE-RELATED OSTEOPOROSIS WITHOUT CURRENT PATHOLOGICAL FRACTURE: ICD-10-CM

## 2021-03-18 DIAGNOSIS — K22.70 BARRETT'S ESOPHAGUS WITHOUT DYSPLASIA: ICD-10-CM

## 2021-03-18 DIAGNOSIS — R42 VERTIGO: ICD-10-CM

## 2021-03-18 DIAGNOSIS — Z00.00 MEDICARE ANNUAL WELLNESS VISIT, SUBSEQUENT: Primary | ICD-10-CM

## 2021-03-18 PROCEDURE — G0439 PPPS, SUBSEQ VISIT: HCPCS | Mod: 95 | Performed by: NURSE PRACTITIONER

## 2021-03-18 ASSESSMENT — ORIENTATION MEMORY CONCENTRATION TEST (OMCT)
OMCT SCORE: 0
COUNT BACKWARDS FROM 20 TO 1: 0-->ERRORS
WHAT MONTH IS IT NOW: 0-->CORRECT
WHAT TIME IS IT (NO WATCH OR CLOCK): 0-->CORRECT
REPEAT THE NAME AND ADDRESS I ASKED YOU TO REMEMBER: ONE TRIAL TO LEARNING
REPEAT THE NAME AND ADDRESS I ASKED YOU TO REMEMBER: 0-->ERRORS
SAY THE MONTHS IN REVERSE ORDER STARTING WITH LAST MONTH: 0-->ERRORS
WHAT YEAR IS IT NOW (MUST BE EXACT): 0-->CORRECT

## 2021-03-18 ASSESSMENT — PATIENT HEALTH QUESTIONNAIRE - PHQ9: SUM OF ALL RESPONSES TO PHQ9 QUESTIONS 1 & 2: 0

## 2021-03-18 NOTE — PROGRESS NOTES
Verification of Patient Location:  The patient affirms they are currently located in the following state: [place]    Request for Consent:   Audio Only Encounter   You and I are about to have a telemedicine check-in or visit. This is allowed because you have requested it. This telemedicine visit will be billed to your health insurance or you, if you are self-insured. You understand you will be responsible for any copayments or coinsurances that apply to your telemedicine visit. Before starting our telemedicine visit, I am required to get your consent for this virtual check-in or visit by telemedicine. Do you consent?    Patient Response to Request for Consent:  Yes    Subjective     Katheryn Goncalves is a 75 y.o. female who presents for a subsequent annual wellness visit.     Patient Care Team:  Sugar Mathews MD as PCP - General  Bourne, Shauna, RN as Care Coordinator  Rl Roland MD as Urogynecologist (Urogynecology)  Raquel Moore MD as Referring Physician  Mily Ortiz MD as Referring Physician  Mily Ortiz MD as Referring Physician    Comprehensive Medical and Social History  Patient Active Problem List   Diagnosis   • Anemia   • Barretts esophagus   • Esophageal reflux   • Hypothyroidism   • Vaginal vault prolapse, posthysterectomy   • Pre-operative clearance   • Abnormal EKG   • Hyperlipidemia   • Medicare annual wellness visit, subsequent   • Calcification of right breast   • History of sacrocolpopexy   • Age-related osteoporosis without current pathological fracture   • COVID-19   • Recurrent UTI   • Vitamin B12 deficiency   • Borderline glaucoma   • Nuclear senile cataract   • 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   • Vaginal prolapse   • History of sacrocolpopexy   • Abdominal bloating   • Rib pain on left side   • Dyslipidemia   • Tinnitus of right ear   • Vertigo     Past Medical History:   Diagnosis Date   • Marshall esophagus    • High cholesterol    •  Hypothyroidism    • Osteoporosis      Past Surgical History:   Procedure Laterality Date   • CHOLECYSTECTOMY     • FEMORAL HERNIA REPAIR Right     bowel dusky but OK - no resection   • TOTAL VAGINAL HYSTERECTOMY  1997     No Known Allergies  Current Outpatient Medications   Medication Sig Dispense Refill   • acetaminophen (TYLENOL) 500 mg tablet Take 500 mg by mouth every 6 (six) hours as needed for mild pain (Takes PRN only).     • atorvastatin (LIPITOR) 20 mg tablet Take 1 tablet (20 mg total) by mouth once daily. 90 tablet 3   • denosumab (PROLIA) 60 mg/mL syringe Inject 60 mg under the skin once. Injection 2 x yearly     • esomeprazole (NexIUM) 40 mg capsule Take 1 capsule (40 mg total) by mouth daily. 90 capsule 0   • levothyroxine (SYNTHROID) 88 mcg tablet Take 1 tablet (88 mcg total) by mouth once daily. 90 tablet 3   • meclizine (ANTIVERT) 25 mg tablet Take 1 tablet (25 mg total) by mouth 3 (three) times a day as needed for dizziness for up to 10 days. 30 tablet 0   • methylPREDNISolone (MEDROL DOSEPACK) 4 mg tablet Follow package directions. 21 tablet 0     No current facility-administered medications for this visit.      Social History     Tobacco Use   • Smoking status: Never Smoker   • Smokeless tobacco: Never Used   Substance Use Topics   • Alcohol use: No   • Drug use: No     Family History   Problem Relation Age of Onset   • Heart disease Biological Father    • Colon cancer Biological Mother        Objective   Vitals  Vitals:    03/18/21 0856   BP: 130/62   Weight: 67.1 kg (148 lb)     Body mass index is 24.63 kg/m².    Advanced Care Plan  Does patient have advance directive?: Yes       Patient has Advance Directive: Patient has Advance Directive, has not brought in                             PHQ  Will the patient answer the depression questions?: Yes   Over the past 2 weeks, how often have you been bothered by feeling little interest or pleasure in doing things?: Not at all   Over the past 2 weeks,  "how often have you been bothered by feeling down, depressed, or hopeless?: Not at all   Depression Risk: 0                                             Short Blessed Test  What year is it now?: 0-->Correct   What month is it now?: 0-->Correct   I will give you a name and address to remember for a few minutes.  Listen to me say the entire name and address and then repeat it after me: \"Bravo Gusman, [address] [place] [address]\": One trial to learning Patient will repeat this name and address at end of test   Without looking at your watch or clock, tell me about what time it is?: 0-->Correct   Count aloud backwards from 20 to 1. : 0-->Errors   Say the months of the year in reverse order.: 0-->Errors   Repeat the name and address I asked you to remember.: 0-->Errors   Short Blessed Test Score: 0     See HRA for relevant hearing screening response.      Assessment/Plan   Diagnoses and all orders for this visit:    Medicare annual wellness visit, subsequent (Primary)  Assessment & Plan:  Patient reports she is doing very well.  Depression and cognition screens negative.  Immunizations discussed.  Medications reviewed and reconciled.  Patient perform her own ADLs.  Low risk for falls.  Patient does have advance care planning.  Prevention screenings discussed.      Vertigo  Assessment & Plan:  Patient did follow-up with ENT.  Diagnosed with labyrinthitis with vertigo being a consequence.  Patient started on high-dose steroids and an MRI was ordered.  Patient will follow back up with ENT on 3/29/2021.      Dyslipidemia  Assessment & Plan:  Due for fasting lipid profile.  Continue statin      Hypothyroidism, unspecified type  Assessment & Plan:  Continue levothyroxine.  Patient due for thyroid studies.      Age-related osteoporosis without current pathological fracture  Assessment & Plan:  DEXA scan up-to-date.  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  Patient follows " with GI.      Tinnitus of right ear  Assessment & Plan:  Patient started on high-dose steroids by ENT.  Hearing test completed by ENT.  Likely worsening of hearing loss due to event        Time Spent:  I spent 25 minutes on this date of service performing the following activities: obtaining history, documenting, preparing for visit, obtaining / reviewing records and providing counseling and education.    See Patient Instructions (the written plan) which was given to the patient for PPPS and health risk factors with interventions.

## 2021-03-18 NOTE — ASSESSMENT & PLAN NOTE
Patient did follow-up with ENT.  Diagnosed with labyrinthitis with vertigo being a consequence.  Patient started on high-dose steroids and an MRI was ordered.  Patient will follow back up with ENT on 3/29/2021.

## 2021-03-18 NOTE — ASSESSMENT & PLAN NOTE
Patient reports she is doing very well.  Depression and cognition screens negative.  Immunizations discussed.  Medications reviewed and reconciled.  Patient perform her own ADLs.  Low risk for falls.  Patient does have advance care planning.  Prevention screenings discussed.

## 2021-03-18 NOTE — PATIENT INSTRUCTIONS
Your Personalized Prevention Plan Services (PPPS)    Preventive Services Checklist (Assumes Average Risk Unless Otherwise Noted):    Health Maintenance Topics with due status: Overdue       Topic Date Due    Varicella Vaccines Never done    Zoster Vaccine 10/07/2008     Health Maintenance Topics with due status: Not Due       Topic Last Completion Date    Colonoscopy 03/09/2015    DTaP, Tdap, and Td Vaccines 12/30/2015    Mammogram 08/29/2019    DEXA Scan 03/10/2021    Medicare Annual Wellness Visit 03/18/2021     Health Maintenance Topics with due status: Completed       Topic Last Completion Date    Hepatitis C Screening 01/25/2017    Pneumococcal (65 years and older) 10/22/2018    Influenza Vaccine 10/13/2020    COVID-19 Vaccine 03/04/2021    Annual Falls Risk Screening 03/18/2021     Health Maintenance Topics with due status: Aged Out       Topic Date Due    Meningococcal ACWY Aged Out    HIB Vaccines Aged Out    IPV Vaccines Aged Out    HPV Vaccines Aged Out    Pneumococcal Aged Out       You May Be Eligible for These Additional Preventive Services   (Assumes Average Risk Unless Otherwise Noted)  Diabetes Screening Any 1 risk factor: hypertension, dyslipidemia, obesity, high glucose; or Any 2 risk factors: >=66yo, overweight, family history diabetes (covered every 6 months)   Hepatitis C Screening Any 1 risk factor: 1) blood transfusion before 1992,   2) current or past injection drug use (annually for high risk; if born between [birth year], see above for status).   Vaccine: Hepatitis B As necessary if at-risk: hemophilia, ESRD, diabetes, living with individual infected with hep B, healthcare worker with frequent contact with blood/bodily fluids (series covered once)   Sexually Transmitted Diseases (STDs) As necessary chlamydia, gonorrhea, syphilis, hepatitis B (covered annually)  HIV if any 1 risk factor present: 1) <16yo or >66yo and at increased risk or 2) 15-66yo and ask for it (covered  annually)   Lung Cancer Screening Low dose chest CT if all 3 risk factors: 1) 55-76yo, 2) smoker or quit within last 15y, 3) >=30 pack years (covered annually).  No results found for this or any previous visit.     Cholesterol Screening Both risk factors: 1) >=21yo and 2)  increased risk coronary artery disease (covered every 5 years).     Breast Cancer Screening Covered once 35-38yo, annually >=41yo (if >=51yo, see above for status).         Health Risk Factors with Personalized Education:  ----------------------------------------------------------------------------------------------------------------------  Controlling Your Cholesterol  · Reduce the amount of saturated and trans fat in your diet.  Limit intake of red meat.  Consume only low-fat or non-fat/skim dairy.  Limit fried food.  Cook with vegetable oils.  · Reduce your intake of sugary foods, sugary drinks and alcohol.  · Eat a diet high in fruit, vegetables and whole grains.  · Get protein from fish, poultry and a small portion of nuts.  · Stay active.  Try to get at least 90 to 150 minutes of exercise per week.  Try brisk walking, swimming, bicycling or dancing.  · Maintain a healthy weight by balancing your diet and exercise.  · If you have been prescribed medication, take it regularly and exactly as prescribed. Let your PCP know if you have any problems or questions about your medication.  · It’s important to know your cholesterol numbers.  When recommended by your PCP, get the cholesterol blood test.  ---------------------------------------------------------------------------------------------------------------------   Controlling Your Osteoporosis, Strengthening Your Bones  · Try to get at least 90 to 150 minutes of weight-bearing exercise per week.  · Ensure intake of at least 1200mg of calcium per day.  Eat foods high in calcium like milk and other dairy, green vegetables, fruit, canned fish with soft and edible bones, nuts, calcium-set tofu.  Some  foods are calcium-fortified, like bread, cereal, fruit juices and mineral water.  · Help your body make vitamin D by getting 10-15 minutes per day of sunlight.    · Ensure intake of at least 600IU of vitamin D per day.  Eat foods high in vitamin D like oily fish (salmon, sardines, mackerel) and eggs.  Some foods are fortified with vitamin D, like dairy and cereals.  · Avoid high amounts of caffeine and salt, since they can cause the body to loose calcium.  · Limit alcohol intake, since it is associated with weaker bones and is associated with falls and fractures.  · Limit intake of fizzy drinks.  · If you have been prescribed medication, take it regularly and exactly as prescribed.  Let your PCP know if you have any problems or questions about your medication  ----------------------------------------------------------------------------------------------------------------------  Reducing Your Risk of Falls  · Tell your PCP if any of your medications make you feel tired, dizzy, lightheaded or off-balance.  · Maintain coordination, flexibility and balance by ensuring regular physical activity.  · Limit alcohol intake to 1 drink per day.  Consider avoiding all alcohol intake.  · Ensure good vision.  Visit an ophthalmologist or optometrist regularly for vision screening or to make sure your glasses / contact lens prescription is correct.  If you need glasses or contacts, wear them.  When you get new glasses or contacts, take time to get used to them.  Do not wear sunglasses or tinted lenses when indoors.  · Ensure good hearing.  Have your hearing checked if you are having trouble hearing, or family and friends think you cannot hear them.  If you need a hearing aid, be sure it fits well and wear it.  · Get enough rest.  Ensure about 7-9 hours of sleep every day.  · Get up slowly from your bed or chairs.  Do not start walking until you are sure you feel steady.  · Wear non-skid, rubber-soled, low-heeled shoes.  Do not walk  in socks, or in shoes and slippers with smooth soles.  · If your PCP or therapist recommends using a cane or walker, use it regularly.  · Make your home safer.  Increase lighting throughout the house, especially at the top and bottom of stairs.  Ensure lighting is easily turned on when getting up in the middle of the night.  Make sure there are two secure rails on all stairs.  Install grab bars in the bathtub / shower and near the toilet.  Consider using a shower chair and / or a hand-held shower.  · Spread sand or salt on icy surfaces.  Beware of wet surfaces, which can be icy.  · Tell your PCP if you have fallen.

## 2021-03-18 NOTE — ASSESSMENT & PLAN NOTE
Patient started on high-dose steroids by ENT.  Hearing test completed by ENT.  Likely worsening of hearing loss due to event

## 2021-03-25 ENCOUNTER — HOSPITAL ENCOUNTER (OUTPATIENT)
Dept: RADIOLOGY | Facility: CLINIC | Age: 76
Discharge: HOME | End: 2021-03-25
Attending: OTOLARYNGOLOGY
Payer: MEDICARE

## 2021-03-25 VITALS — WEIGHT: 148 LBS | BODY MASS INDEX: 24.63 KG/M2

## 2021-03-25 DIAGNOSIS — H90.41 SENSORINEURAL HEARING LOSS, UNILATERAL, RIGHT EAR, WITH UNRESTRICTED HEARING ON THE CONTRALATERAL SIDE: ICD-10-CM

## 2021-03-25 RX ORDER — GADOBUTROL 604.72 MG/ML
6.5 INJECTION INTRAVENOUS ONCE
Status: COMPLETED | OUTPATIENT
Start: 2021-03-25 | End: 2021-03-25

## 2021-03-25 RX ADMIN — GADOBUTROL 6.5 MMOL: 604.72 INJECTION INTRAVENOUS at 12:16

## 2021-05-03 ENCOUNTER — TELEPHONE (OUTPATIENT)
Dept: INTERNAL MEDICINE | Facility: CLINIC | Age: 76
End: 2021-05-03

## 2021-05-04 ENCOUNTER — TRANSCRIBE ORDERS (OUTPATIENT)
Dept: SCHEDULING | Age: 76
End: 2021-05-04

## 2021-05-04 ENCOUNTER — APPOINTMENT (OUTPATIENT)
Dept: LAB | Facility: CLINIC | Age: 76
End: 2021-05-04
Attending: NURSE PRACTITIONER
Payer: MEDICARE

## 2021-05-04 DIAGNOSIS — H93.11 TINNITUS OF RIGHT EAR: ICD-10-CM

## 2021-05-04 DIAGNOSIS — E78.5 HYPERLIPIDEMIA, UNSPECIFIED HYPERLIPIDEMIA TYPE: ICD-10-CM

## 2021-05-04 DIAGNOSIS — E03.9 HYPOTHYROIDISM, UNSPECIFIED TYPE: ICD-10-CM

## 2021-05-04 DIAGNOSIS — R42 VERTIGO: ICD-10-CM

## 2021-05-04 DIAGNOSIS — Z12.31 SCREENING MAMMOGRAM, ENCOUNTER FOR: Primary | ICD-10-CM

## 2021-05-04 LAB
ALBUMIN SERPL-MCNC: 4 G/DL (ref 3.4–5)
ALP SERPL-CCNC: 56 IU/L (ref 35–126)
ALT SERPL-CCNC: 17 IU/L (ref 11–54)
ANION GAP SERPL CALC-SCNC: 8 MEQ/L (ref 3–15)
AST SERPL-CCNC: 19 IU/L (ref 15–41)
BASOPHILS # BLD: 0.04 K/UL (ref 0.01–0.1)
BASOPHILS NFR BLD: 0.9 %
BILIRUB SERPL-MCNC: 0.6 MG/DL (ref 0.3–1.2)
BUN SERPL-MCNC: 8 MG/DL (ref 8–20)
CALCIUM SERPL-MCNC: 9.4 MG/DL (ref 8.9–10.3)
CHLORIDE SERPL-SCNC: 104 MEQ/L (ref 98–109)
CHOLEST SERPL-MCNC: 152 MG/DL
CO2 SERPL-SCNC: 27 MEQ/L (ref 22–32)
CREAT SERPL-MCNC: 0.7 MG/DL (ref 0.6–1.1)
DIFFERENTIAL METHOD BLD: ABNORMAL
EOSINOPHIL # BLD: 0.24 K/UL (ref 0.04–0.36)
EOSINOPHIL NFR BLD: 5.5 %
ERYTHROCYTE [DISTWIDTH] IN BLOOD BY AUTOMATED COUNT: 12.3 % (ref 11.7–14.4)
GFR SERPL CREATININE-BSD FRML MDRD: >60 ML/MIN/1.73M*2
GLUCOSE SERPL-MCNC: 98 MG/DL (ref 70–99)
HCT VFR BLDCO AUTO: 41.9 % (ref 35–45)
HDLC SERPL-MCNC: 59 MG/DL
HDLC SERPL: 2.6 {RATIO}
HGB BLD-MCNC: 13.8 G/DL (ref 11.8–15.7)
IMM GRANULOCYTES # BLD AUTO: 0 K/UL (ref 0–0.08)
IMM GRANULOCYTES NFR BLD AUTO: 0 %
LDLC SERPL CALC-MCNC: 82 MG/DL
LYMPHOCYTES # BLD: 1.45 K/UL (ref 1.2–3.5)
LYMPHOCYTES NFR BLD: 33.3 %
MCH RBC QN AUTO: 32.5 PG (ref 28–33.2)
MCHC RBC AUTO-ENTMCNC: 32.9 G/DL (ref 32.2–35.5)
MCV RBC AUTO: 98.6 FL (ref 83–98)
MONOCYTES # BLD: 0.38 K/UL (ref 0.28–0.8)
MONOCYTES NFR BLD: 8.7 %
NEUTROPHILS # BLD: 2.25 K/UL (ref 1.7–7)
NEUTS SEG NFR BLD: 51.6 %
NONHDLC SERPL-MCNC: 93 MG/DL
NRBC BLD-RTO: 0 %
PDW BLD AUTO: 10 FL (ref 9.4–12.3)
PLATELET # BLD AUTO: 208 K/UL (ref 150–369)
POTASSIUM SERPL-SCNC: 4.6 MEQ/L (ref 3.6–5.1)
PROT SERPL-MCNC: 6 G/DL (ref 6–8.2)
RBC # BLD AUTO: 4.25 M/UL (ref 3.93–5.22)
SODIUM SERPL-SCNC: 139 MEQ/L (ref 136–144)
T4 FREE SERPL-MCNC: 0.89 NG/DL (ref 0.58–1.64)
TRIGL SERPL-MCNC: 56 MG/DL (ref 30–149)
TSH SERPL DL<=0.05 MIU/L-ACNC: 1.19 MIU/L (ref 0.34–5.6)
WBC # BLD AUTO: 4.36 K/UL (ref 3.8–10.5)

## 2021-05-04 PROCEDURE — 84439 ASSAY OF FREE THYROXINE: CPT

## 2021-05-04 PROCEDURE — 36415 COLL VENOUS BLD VENIPUNCTURE: CPT

## 2021-05-04 PROCEDURE — 80053 COMPREHEN METABOLIC PANEL: CPT

## 2021-05-04 PROCEDURE — 85025 COMPLETE CBC W/AUTO DIFF WBC: CPT

## 2021-05-04 PROCEDURE — 84443 ASSAY THYROID STIM HORMONE: CPT

## 2021-05-04 PROCEDURE — 80061 LIPID PANEL: CPT

## 2021-05-05 ENCOUNTER — HOSPITAL ENCOUNTER (OUTPATIENT)
Dept: RADIOLOGY | Facility: CLINIC | Age: 76
Discharge: HOME | End: 2021-05-05
Attending: INTERNAL MEDICINE
Payer: MEDICARE

## 2021-05-05 DIAGNOSIS — Z12.31 SCREENING MAMMOGRAM, ENCOUNTER FOR: ICD-10-CM

## 2021-05-05 PROCEDURE — 77067 SCR MAMMO BI INCL CAD: CPT

## 2021-05-05 PROCEDURE — 77063 BREAST TOMOSYNTHESIS BI: CPT

## 2021-05-10 ENCOUNTER — OFFICE VISIT (OUTPATIENT)
Dept: INTERNAL MEDICINE | Facility: CLINIC | Age: 76
End: 2021-05-10
Payer: MEDICARE

## 2021-05-10 VITALS
HEART RATE: 62 BPM | WEIGHT: 147 LBS | BODY MASS INDEX: 24.49 KG/M2 | OXYGEN SATURATION: 96 % | DIASTOLIC BLOOD PRESSURE: 72 MMHG | HEIGHT: 65 IN | TEMPERATURE: 97.6 F | SYSTOLIC BLOOD PRESSURE: 122 MMHG

## 2021-05-10 DIAGNOSIS — M81.0 AGE-RELATED OSTEOPOROSIS WITHOUT CURRENT PATHOLOGICAL FRACTURE: ICD-10-CM

## 2021-05-10 DIAGNOSIS — E78.5 HYPERLIPIDEMIA, UNSPECIFIED HYPERLIPIDEMIA TYPE: Primary | ICD-10-CM

## 2021-05-10 DIAGNOSIS — N39.0 RECURRENT UTI: ICD-10-CM

## 2021-05-10 DIAGNOSIS — E03.2 HYPOTHYROIDISM DUE TO MEDICATION: ICD-10-CM

## 2021-05-10 DIAGNOSIS — Z98.890 HISTORY OF SACROCOLPOPEXY: ICD-10-CM

## 2021-05-10 PROCEDURE — 99214 OFFICE O/P EST MOD 30 MIN: CPT | Performed by: INTERNAL MEDICINE

## 2021-05-10 NOTE — PROGRESS NOTES
Subjective      Patient ID: Katheryn Goncalves is a 75 y.o. female.    HPI   She is here for follow up - had her blood work , mammogram and bone density.  Had covid vaccines - saw Dr. Childs for labyrynthisits and heraring loss - is now feeling better - has some hearing loss in her right ear. Also sees Dr. Raquel Faust for pelvic floor dysfunction.  Recently had a UTI treated with Macrobid for 5 days .  Denies any dysuria urgency or frequency.    The following have been reviewed and updated as appropriate in this visit:  Allergies  Meds  Problems       Past Medical History:   Diagnosis Date   • Marshall esophagus    • High cholesterol    • Hypothyroidism    • Osteoporosis      Past Surgical History:   Procedure Laterality Date   • CHOLECYSTECTOMY     • FEMORAL HERNIA REPAIR Right     bowel dusky but OK - no resection   • TOTAL VAGINAL HYSTERECTOMY  1997     Family History   Problem Relation Age of Onset   • Heart disease Biological Father    • Colon cancer Biological Mother      Social History     Socioeconomic History   • Marital status:      Spouse name: Not on file   • Number of children: Not on file   • Years of education: Not on file   • Highest education level: Not on file   Occupational History   • Not on file   Tobacco Use   • Smoking status: Never Smoker   • Smokeless tobacco: Never Used   Vaping Use   • Vaping Use: Never used   Substance and Sexual Activity   • Alcohol use: No   • Drug use: No   • Sexual activity: Never   Other Topics Concern   • Not on file   Social History Narrative   • Not on file     Social Determinants of Health     Financial Resource Strain:    • Difficulty of Paying Living Expenses:    Food Insecurity:    • Worried About Running Out of Food in the Last Year:    • Ran Out of Food in the Last Year:    Transportation Needs:    • Lack of Transportation (Medical):    • Lack of Transportation (Non-Medical):    Physical Activity:    • Days of Exercise per Week:    • Minutes of Exercise  per Session:    Stress:    • Feeling of Stress :    Social Connections:    • Frequency of Communication with Friends and Family:    • Frequency of Social Gatherings with Friends and Family:    • Attends [employer] Services:    • Active Member of Clubs or Organizations:    • Attends Club or Organization Meetings:    • Marital Status:    Intimate Partner Violence:    • Fear of Current or Ex-Partner:    • Emotionally Abused:    • Physically Abused:    • Sexually Abused:        Review of Systems   Respiratory: Negative for cough, chest tightness and wheezing.    Cardiovascular: Negative for chest pain, palpitations and leg swelling.   Endocrine: Negative for cold intolerance and heat intolerance.   Genitourinary: Negative for difficulty urinating, dysuria, flank pain, frequency and hematuria.       No Known Allergies  Current Outpatient Medications   Medication Sig Dispense Refill   • acetaminophen (TYLENOL) 500 mg tablet Take 500 mg by mouth every 6 (six) hours as needed for mild pain (Takes PRN only).     • atorvastatin (LIPITOR) 20 mg tablet Take 1 tablet (20 mg total) by mouth once daily. 90 tablet 3   • denosumab (PROLIA) 60 mg/mL syringe Inject 60 mg under the skin once. Injection 2 x yearly     • estradioL (ESTRACE) 0.01 % (0.1 mg/gram) vaginal cream Insert 2 g into the vagina 2 (two) times a week (Mon, Thu).     • levothyroxine (SYNTHROID) 88 mcg tablet Take 1 tablet (88 mcg total) by mouth once daily. 90 tablet 3   • esomeprazole (NexIUM) 40 mg capsule Take 1 capsule (40 mg total) by mouth daily. 90 capsule 0   • esomeprazole (NexIUM) 40 mg capsule Take 40 mg by mouth daily before breakfast.     • meclizine (ANTIVERT) 25 mg tablet Take 1 tablet (25 mg total) by mouth 3 (three) times a day as needed for dizziness for up to 10 days. 30 tablet 0     No current facility-administered medications for this visit.       Objective   Vitals:    05/10/21 1402   BP: 122/72   BP Location: Right upper arm   Patient Position:  "Sitting   Pulse: 62   Temp: 36.4 °C (97.6 °F)   SpO2: 96%   Weight: 66.7 kg (147 lb)   Height: 1.651 m (5' 5\")       Physical Exam  Vitals and nursing note reviewed.   Constitutional:       Appearance: She is well-developed.   HENT:      Head: Normocephalic and atraumatic.      Right Ear: External ear normal.      Left Ear: External ear normal.      Nose: Nose normal.   Eyes:      Conjunctiva/sclera: Conjunctivae normal.      Pupils: Pupils are equal, round, and reactive to light.   Cardiovascular:      Rate and Rhythm: Normal rate and regular rhythm.      Heart sounds: Normal heart sounds.   Pulmonary:      Effort: Pulmonary effort is normal.      Breath sounds: Normal breath sounds.   Abdominal:      General: Bowel sounds are normal.      Palpations: Abdomen is soft. There is no mass.      Tenderness: There is no abdominal tenderness.   Musculoskeletal:         General: Normal range of motion.      Cervical back: Normal range of motion and neck supple.   Skin:     General: Skin is warm and dry.   Neurological:      Mental Status: She is alert and oriented to person, place, and time.   Psychiatric:         Behavior: Behavior normal.         Assessment/Plan   Problem List Items Addressed This Visit        Genitourinary    Recurrent UTI     Just finished Macrobid for UTI.  Is considering using d-mannose            Musculoskeletal    Age-related osteoporosis without current pathological fracture     Reviewed her most recent bone density with her.  There is overall improvement.  We will continue with Prolia injections beyond 2 years.  Advise Caltrate with vitamin D dietary calcium and vitamin D supplementation of 2000 units D3 daily            Endocrine/Metabolic    Hypothyroidism     She is clinically and biochemically euthyroid.  Continue levothyroxine         Hyperlipidemia - Primary     Lipid profile is at goal.  Counseled on heart healthy Mediterranean diet.  Continue with daily statin            Other    History " of sacrocolpopexy     Continues to do well.  Will follow with Dr. Raquel Means MD    5/11/2021

## 2021-05-11 PROBLEM — Z98.890 HISTORY OF SACROCOLPOPEXY: Status: RESOLVED | Noted: 2020-07-23 | Resolved: 2021-05-11

## 2021-05-11 ASSESSMENT — ENCOUNTER SYMPTOMS
FREQUENCY: 0
DYSURIA: 0
WHEEZING: 0
COUGH: 0
PALPITATIONS: 0
CHEST TIGHTNESS: 0
FLANK PAIN: 0
HEMATURIA: 0
DIFFICULTY URINATING: 0

## 2021-05-12 NOTE — ASSESSMENT & PLAN NOTE
Reviewed her most recent bone density with her.  There is overall improvement.  We will continue with Prolia injections beyond 2 years.  Advise Caltrate with vitamin D dietary calcium and vitamin D supplementation of 2000 units D3 daily

## 2021-05-12 NOTE — ASSESSMENT & PLAN NOTE
Lipid profile is at goal.  Counseled on heart healthy Mediterranean diet.  Continue with daily statin

## 2021-06-18 NOTE — TELEPHONE ENCOUNTER
Gave result and stated that if there were any further detail to give us a call in the office.   What Type Of Note Output Would You Prefer (Optional)?: Bullet Format How Severe Is Your Skin Lesion?: mild Has Your Skin Lesion Been Treated?: not been treated Is This A New Presentation, Or A Follow-Up?: Skin Lesion treated_been_treated

## 2021-06-22 ENCOUNTER — HOSPITAL ENCOUNTER (OUTPATIENT)
Dept: RADIOLOGY | Facility: CLINIC | Age: 76
Discharge: HOME | End: 2021-06-22
Attending: INTERNAL MEDICINE
Payer: MEDICARE

## 2021-06-22 ENCOUNTER — OFFICE VISIT (OUTPATIENT)
Dept: INTERNAL MEDICINE | Facility: CLINIC | Age: 76
End: 2021-06-22
Payer: MEDICARE

## 2021-06-22 ENCOUNTER — TELEPHONE (OUTPATIENT)
Dept: INTERNAL MEDICINE | Facility: CLINIC | Age: 76
End: 2021-06-22

## 2021-06-22 VITALS
WEIGHT: 147 LBS | OXYGEN SATURATION: 97 % | HEART RATE: 58 BPM | TEMPERATURE: 98.3 F | SYSTOLIC BLOOD PRESSURE: 134 MMHG | DIASTOLIC BLOOD PRESSURE: 82 MMHG | BODY MASS INDEX: 24.49 KG/M2 | HEIGHT: 65 IN

## 2021-06-22 DIAGNOSIS — M25.552 LEFT HIP PAIN: Primary | ICD-10-CM

## 2021-06-22 DIAGNOSIS — K41.91: ICD-10-CM

## 2021-06-22 DIAGNOSIS — M25.552 LEFT HIP PAIN: ICD-10-CM

## 2021-06-22 PROCEDURE — 73503 X-RAY EXAM HIP UNI 4/> VIEWS: CPT | Mod: LT

## 2021-06-22 PROCEDURE — 99213 OFFICE O/P EST LOW 20 MIN: CPT | Performed by: INTERNAL MEDICINE

## 2021-06-22 ASSESSMENT — ENCOUNTER SYMPTOMS
BACK PAIN: 0
FEVER: 0
NUMBNESS: 0
VOMITING: 0
CONSTIPATION: 0
CHILLS: 0
NAUSEA: 0
DIARRHEA: 0
ABDOMINAL PAIN: 0
WEAKNESS: 0

## 2021-06-22 NOTE — TELEPHONE ENCOUNTER
She can wait as long as she does not have pain on the right abdomen - I will let her know what the  Hip  rays shows

## 2021-06-22 NOTE — TELEPHONE ENCOUNTER
Patient cannot see Dr. Bragg until 8/3/2021.  She can be seen by Dr. Heath next week.  She wants to know if she should see Dr. Heath next week or wait until August to see Dr. Bragg.

## 2021-06-22 NOTE — PROGRESS NOTES
Subjective      Patient ID: Katheryn Goncalves is a 75 y.o. female.    HPI She is here for follow up - has a history of a right femoral hernia repair in 2018 ( Dr. Yang Green) . Has been carryuing her 22 pound grandchild two days a week - has been experiencing groin pain on the left- occurs when she is carrying the baby - pain is in the groin area. Can last minutes. Worse when she walks also. Does not wake her from sleep. [place] any associated abdominal pain nausea or vomiting. No low back pain.    The following have been reviewed and updated as appropriate in this visit:       Past Medical History:   Diagnosis Date   • Marshall esophagus    • High cholesterol    • Hypothyroidism    • Osteoporosis      Past Surgical History:   Procedure Laterality Date   • CHOLECYSTECTOMY     • FEMORAL HERNIA REPAIR Right     bowel dusky but OK - no resection   • TOTAL VAGINAL HYSTERECTOMY  1997     Family History   Problem Relation Age of Onset   • Heart disease Biological Father    • Colon cancer Biological Mother      Social History     Socioeconomic History   • Marital status:      Spouse name: Not on file   • Number of children: Not on file   • Years of education: Not on file   • Highest education level: Not on file   Occupational History   • Not on file   Tobacco Use   • Smoking status: Never Smoker   • Smokeless tobacco: Never Used   Vaping Use   • Vaping Use: Never used   Substance and Sexual Activity   • Alcohol use: No   • Drug use: No   • Sexual activity: Never   Other Topics Concern   • Not on file   Social History Narrative   • Not on file     Social Determinants of Health     Financial Resource Strain:    • Difficulty of Paying Living Expenses:    Food Insecurity:    • Worried About Running Out of Food in the Last Year:    • Ran Out of Food in the Last Year:    Transportation Needs:    • Lack of Transportation (Medical):    • Lack of Transportation (Non-Medical):    Physical Activity:    • Days of Exercise per  "Week:    • Minutes of Exercise per Session:    Stress:    • Feeling of Stress :    Social Connections:    • Frequency of Communication with Friends and Family:    • Frequency of Social Gatherings with Friends and Family:    • Attends Christian Services:    • Active Member of Clubs or Organizations:    • Attends Club or Organization Meetings:    • Marital Status:    Intimate Partner Violence:    • Fear of Current or Ex-Partner:    • Emotionally Abused:    • Physically Abused:    • Sexually Abused:        Review of Systems   Constitutional: Negative for chills and fever.   Gastrointestinal: Negative for abdominal pain, constipation, diarrhea, nausea and vomiting.   Musculoskeletal: Negative for back pain.   Neurological: Negative for weakness and numbness.       No Known Allergies  Current Outpatient Medications   Medication Sig Dispense Refill   • acetaminophen (TYLENOL) 500 mg tablet Take 500 mg by mouth every 6 (six) hours as needed for mild pain (Takes PRN only).     • atorvastatin (LIPITOR) 20 mg tablet Take 1 tablet (20 mg total) by mouth once daily. 90 tablet 3   • denosumab (PROLIA) 60 mg/mL syringe Inject 60 mg under the skin once. Injection 2 x yearly     • estradioL (ESTRACE) 0.01 % (0.1 mg/gram) vaginal cream Insert 2 g into the vagina 2 (two) times a week (Mon, Thu).     • levothyroxine (SYNTHROID) 88 mcg tablet Take 1 tablet (88 mcg total) by mouth once daily. 90 tablet 3   • esomeprazole (NexIUM) 40 mg capsule Take 1 capsule (40 mg total) by mouth daily. 90 capsule 0     No current facility-administered medications for this visit.       Objective   Vitals:    06/22/21 1012   BP: 134/82   BP Location: Right upper arm   Patient Position: Sitting   Pulse: (!) 58   Temp: 36.8 °C (98.3 °F)   SpO2: 97%   Weight: 66.7 kg (147 lb)   Height: 1.651 m (5' 5\")       Physical Exam  Vitals and nursing note reviewed.   Constitutional:       Appearance: She is well-developed.   HENT:      Head: Normocephalic and " atraumatic.   Neck:      Thyroid: No thyromegaly.      Vascular: No carotid bruit.   Cardiovascular:      Rate and Rhythm: Normal rate and regular rhythm.      Pulses: Normal pulses.      Heart sounds: Normal heart sounds. No murmur heard.     Pulmonary:      Effort: Pulmonary effort is normal.      Breath sounds: Normal breath sounds.   Abdominal:      General: Bowel sounds are normal.      Palpations: Abdomen is soft. There is no mass.      Tenderness: There is no abdominal tenderness.      Comments: Soft reducible mass 3 x 2 cm right lower abdominal wall   Musculoskeletal:      Cervical back: Normal range of motion and neck supple.      Comments: Some tenderness over the left trochanteric bursa  Full Rom hip bilaterally   Skin:     General: Skin is warm and dry.   Neurological:      Mental Status: She is alert and oriented to person, place, and time.   Psychiatric:         Behavior: Behavior normal.         Thought Content: Thought content normal.         Judgment: Judgment normal.         Assessment/Plan   Problem List Items Addressed This Visit        Nervous    Left hip pain - Primary     Some tenderness over the left trochanteric bursa which might be the source of her pain. We will check an x-ray of the left hip to evaluate for osteoarthritis. I do not appreciate any hernia on the left abdominal wall or inguinal region         Relevant Orders    X-RAY HIP WITH OR WITHOUT PELVIS 4+ VW LEFT       Musculoskeletal    Recurrent right femoral hernia     She does have a palpable right lower abdominal mass consistent with recurrent right femoral hernia. We will have her see Dr. James Christiansen for follow-up. It is soft and easily reducible               Sugar Means MD    6/22/2021

## 2021-06-22 NOTE — PATIENT INSTRUCTIONS
Obtain an x ray of your left hip  Follow with Dr. James Bargg at [facility]  For your hernia  [phone]

## 2021-06-22 NOTE — ASSESSMENT & PLAN NOTE
She does have a palpable right lower abdominal mass consistent with recurrent right femoral hernia. We will have her see Dr. James Christiansen for follow-up. It is soft and easily reducible

## 2021-06-22 NOTE — ASSESSMENT & PLAN NOTE
Some tenderness over the left trochanteric bursa which might be the source of her pain. We will check an x-ray of the left hip to evaluate for osteoarthritis. I do not appreciate any hernia on the left abdominal wall or inguinal region

## 2021-06-26 ENCOUNTER — TELEPHONE (OUTPATIENT)
Dept: INTERNAL MEDICINE | Facility: CLINIC | Age: 76
End: 2021-06-26

## 2021-06-27 NOTE — TELEPHONE ENCOUNTER
Please let Ms Gonclaves know that her x rays of the hip did not reveal significant arthritis - I am concerned she has a bursitis - would like her to see physiatry - Dr. Di Benson or Dr. Rodríguez Peraza or Dr. Johanna Brown or Dr. Blank at [facility] to consider injection and then PT    - their phone number is

## 2021-06-28 NOTE — TELEPHONE ENCOUNTER
Left vm informing pt of results as well as instructions w/ phone number. Asked pt to rtc if further concerns.

## 2021-06-29 ENCOUNTER — TELEPHONE (OUTPATIENT)
Dept: INTERNAL MEDICINE | Facility: CLINIC | Age: 76
End: 2021-06-29

## 2021-06-29 NOTE — TELEPHONE ENCOUNTER
Patient called back to let you know she is no longer experiencing pain.  Do you still want her to see a physiatrist?

## 2021-07-08 ENCOUNTER — TELEPHONE (OUTPATIENT)
Dept: SURGERY | Facility: CLINIC | Age: 76
End: 2021-07-08

## 2021-08-03 ENCOUNTER — OFFICE VISIT (OUTPATIENT)
Dept: SURGERY | Facility: CLINIC | Age: 76
End: 2021-08-03
Payer: MEDICARE

## 2021-08-03 VITALS
WEIGHT: 140 LBS | DIASTOLIC BLOOD PRESSURE: 87 MMHG | HEIGHT: 65 IN | BODY MASS INDEX: 23.32 KG/M2 | HEART RATE: 87 BPM | SYSTOLIC BLOOD PRESSURE: 149 MMHG

## 2021-08-03 DIAGNOSIS — K41.91 UNILATERAL RECURRENT FEMORAL HERNIA WITHOUT OBSTRUCTION OR GANGRENE: Primary | ICD-10-CM

## 2021-08-03 PROCEDURE — 99204 OFFICE O/P NEW MOD 45 MIN: CPT | Performed by: SURGERY

## 2021-08-03 RX ORDER — SODIUM CHLORIDE 9 MG/ML
INJECTION, SOLUTION INTRAVENOUS CONTINUOUS
Status: CANCELLED | OUTPATIENT
Start: 2021-08-03 | End: 2021-08-04

## 2021-08-03 RX ORDER — GABAPENTIN 100 MG/1
300 CAPSULE ORAL ONCE
Status: CANCELLED | OUTPATIENT
Start: 2021-08-03 | End: 2021-08-03

## 2021-08-03 RX ORDER — CELECOXIB 100 MG/1
200 CAPSULE ORAL ONCE
Status: CANCELLED | OUTPATIENT
Start: 2021-08-03 | End: 2021-08-03

## 2021-08-03 RX ORDER — ACETAMINOPHEN 325 MG/1
650 TABLET ORAL ONCE
Status: CANCELLED | OUTPATIENT
Start: 2021-08-03 | End: 2021-08-03

## 2021-08-03 NOTE — PROGRESS NOTES
Patient ID: Katheryn Goncalves                              : 1945  MRN: [id number]                                            Visit Date: 8/3/2021  Encounter Provider: James Bragg    Chief Complaint: Consult    Katheryn Goncalves is a 75 y.o. female presenting for evaluation of a recurrent right femoral hernia.  She had an open right femoral hernia repair by Dr. Green in .  This was for an incarcerated hernia causing a bowel obstruction.  She presented to her PCP in  of this year complaining of left inguinal pain after carrying her grandchild.  She was found to have tenderness over the left trochanteric bursa.  She was also found to have a recurrent hernia on the right side and was referred for evaluation.  She says her right inguinal region has not changed since the surgery in .  She denies pain in the right inguinal region, but she is aware of some discomfort at times.  She says she favors the right side when she carries a grandchild or groceries.    Past Medical History:  has a past medical history of Marshall esophagus, High cholesterol, Hypothyroidism, and Osteoporosis.  Past Surgical History:  has a past surgical history that includes Cholecystectomy; Open right femoral hernia repair by Dr. Green () during which the right inguinal ligament was incised; trans-vaginal hysterectomy (); vaginal sling, and vaginal sling revision .  Social History:  reports that she has never smoked. She has never used smokeless tobacco. She reports that she does not drink alcohol and does not use drugs. She is a retired teacher.  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  She regularly babysits her 22 pound grandchild.  Family History: family history includes Colon cancer in her biological mother; Heart disease in her biological father.  Bilateral inguinal hernias in her father and her son.  Allergies: has No Known Allergies.     Medications:   Current Outpatient Medications:   •  acetaminophen  "(TYLENOL) 500 mg tablet, Take 500 mg by mouth every 6 (six) hours as needed for mild pain (Takes PRN only)., Disp: , Rfl:   •  atorvastatin (LIPITOR) 20 mg tablet, Take 1 tablet (20 mg total) by mouth once daily., Disp: 90 tablet, Rfl: 3  •  denosumab (PROLIA) 60 mg/mL syringe, Inject 60 mg under the skin once. Injection 2 x yearly, Disp: , Rfl:   •  esomeprazole (NexIUM) 40 mg capsule, Take 1 capsule (40 mg total) by mouth daily., Disp: 90 capsule, Rfl: 0  •  estradioL (ESTRACE) 0.01 % (0.1 mg/gram) vaginal cream, Insert 2 g into the vagina 2 (two) times a week (Mon, Thu)., Disp: , Rfl:   •  levothyroxine (SYNTHROID) 88 mcg tablet, Take 1 tablet (88 mcg total) by mouth once daily., Disp: 90 tablet, Rfl: 3    Pertinent review of systems:  No GI or  complaints.    Physical Exam  Visit Vitals  BP (!) 149/87   Pulse 87   Ht 1.651 m (5' 5\")   Wt 63.5 kg (140 lb)   BMI 23.30 kg/m²     General appearance: Very pleasant, no acute distress. Alert, responds appropriately.   HEENT: Normocephalic, without obvious abnormality. Conjunctiva clear. Sclerae anicteric. Isolation mask in place. No cough.   Lungs: Clear to auscultation bilaterally.   Heart: Regular rate and rhythm.  Normal S1 and S2 without murmer.  Abdomen: Soft, nontender, nondistended.  No visible scars.  There is a moderate sized right inguinal/femoral hernia.  It is nontender and easily reducible.  No left inguinal hernia.  Extremities: Warm and well perfused. No edema.  Neurologic:  Grossly normal. Normal mood and affect.    Review of medical records:  I reviewed the progress notes from her most recent visit with her PCP on 6/22/2021.  Pertinent points are noted above.  I also reviewed the operative note from her open right femoral hernia repair by Dr. rGeen on 3/18/2018.  There was strangulated bowel that was found to be viable once the strangulation was relieved by incising the inguinal ligament.  The hernia was repaired without mesh.     Assessment and " Plan:  Recurrent right femoral hernia that seems to include an inguinal component as well.  It is mildly symptomatic.  We discussed the risks and benefits of surgery in detail, including the risks of hernia recurrence or persistent postoperative pain.  She is a candidate for laparoscopic repair with mesh under general anesthesia.  She wants to proceed this fall.  She will check her schedule and call to set up the procedure when she is ready.    James Bragg MD

## 2021-08-03 NOTE — LETTER
August 3, 2021     Sugar Means MD  [address] [facility] [address]    Patient: Katheryn Goncalves  YOB: 1945  Date of Visit: 8/3/2021      Dear Dr. Eva Means:    Thank you for referring Katheryn Goncalves to me for evaluation. Below are my notes for this consultation.    If you have questions, please do not hesitate to call me. My mobile phone number is [phone], and my office number is [phone].  I look forward to following your patient along with you.      Sincerely,        James Bragg MD        CC: James Poole MD  8/3/2021 10:42 AM  Signed  Patient ID: Katheryn Goncalves                              : 1945  MRN: [id number]                                            Visit Date: 8/3/2021  Encounter Provider: James Bragg    Chief Complaint: Consult    Katheryn Goncalves is a 75 y.o. female presenting for evaluation of a recurrent right femoral hernia.  She had an open right femoral hernia repair by Dr. Green in 2018.  This was for an incarcerated hernia causing a bowel obstruction.  She presented to her PCP in  of this year complaining of left inguinal pain after carrying her grandchild.  She was found to have tenderness over the left trochanteric bursa.  She was also found to have a recurrent hernia on the right side and was referred for evaluation.  She says her right inguinal region has not changed since the surgery in 2018.  She denies pain in the right inguinal region, but she is aware of some discomfort at times.  She says she favors the right side when she carries a grandchild or groceries.    Past Medical History:  has a past medical history of Marshall esophagus, High cholesterol, Hypothyroidism, and Osteoporosis.  Past Surgical History:  has a past surgical history that includes Cholecystectomy; Open right femoral hernia repair by Dr. Green (2018) during which the right inguinal ligament was incised;  "trans-vaginal hysterectomy (1997); vaginal sling, and vaginal sling revision 2020.  Social History:  reports that she has never smoked. She has never used smokeless tobacco. She reports that she does not drink alcohol and does not use drugs. She is a retired teacher.  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  She regularly babysits her 22 pound grandchild.  Family History: family history includes Colon cancer in her biological mother; Heart disease in her biological father.  Bilateral inguinal hernias in her father and her son.  Allergies: has No Known Allergies.     Medications:   Current Outpatient Medications:   •  acetaminophen (TYLENOL) 500 mg tablet, Take 500 mg by mouth every 6 (six) hours as needed for mild pain (Takes PRN only)., Disp: , Rfl:   •  atorvastatin (LIPITOR) 20 mg tablet, Take 1 tablet (20 mg total) by mouth once daily., Disp: 90 tablet, Rfl: 3  •  denosumab (PROLIA) 60 mg/mL syringe, Inject 60 mg under the skin once. Injection 2 x yearly, Disp: , Rfl:   •  esomeprazole (NexIUM) 40 mg capsule, Take 1 capsule (40 mg total) by mouth daily., Disp: 90 capsule, Rfl: 0  •  estradioL (ESTRACE) 0.01 % (0.1 mg/gram) vaginal cream, Insert 2 g into the vagina 2 (two) times a week (Mon, Thu)., Disp: , Rfl:   •  levothyroxine (SYNTHROID) 88 mcg tablet, Take 1 tablet (88 mcg total) by mouth once daily., Disp: 90 tablet, Rfl: 3    Pertinent review of systems:  No GI or  complaints.    Physical Exam  Visit Vitals  BP (!) 149/87   Pulse 87   Ht 1.651 m (5' 5\")   Wt 63.5 kg (140 lb)   BMI 23.30 kg/m²     General appearance: Very pleasant, no acute distress. Alert, responds appropriately.   HEENT: Normocephalic, without obvious abnormality. Conjunctiva clear. Sclerae anicteric. Isolation mask in place. No cough.   Lungs: Clear to auscultation bilaterally.   Heart: Regular rate and rhythm.  Normal S1 and S2 without murmer.  Abdomen: Soft, nontender, nondistended.  No visible scars.  There is a moderate sized " right inguinal/femoral hernia.  It is nontender and easily reducible.  No left inguinal hernia.  Extremities: Warm and well perfused. No edema.  Neurologic:  Grossly normal. Normal mood and affect.    Review of medical records:  I reviewed the progress notes from her most recent visit with her PCP on 6/22/2021.  Pertinent points are noted above.  I also reviewed the operative note from her open right femoral hernia repair by Dr. Green on 3/18/2018.  There was strangulated bowel that was found to be viable once the strangulation was relieved by incising the inguinal ligament.  The hernia was repaired without mesh.     Assessment and Plan:  Recurrent right femoral hernia that seems to include an inguinal component as well.  It is mildly symptomatic.  We discussed the risks and benefits of surgery in detail, including the risks of hernia recurrence or persistent postoperative pain.  She is a candidate for laparoscopic repair with mesh under general anesthesia.  She wants to proceed this fall.  She will check her schedule and call to set up the procedure when she is ready.    James Bragg MD

## 2021-08-24 ENCOUNTER — TELEPHONE (OUTPATIENT)
Dept: INTERNAL MEDICINE | Facility: CLINIC | Age: 76
End: 2021-08-24

## 2021-10-12 ENCOUNTER — CLINICAL SUPPORT (OUTPATIENT)
Dept: INTERNAL MEDICINE | Facility: CLINIC | Age: 76
End: 2021-10-12
Payer: MEDICARE

## 2021-10-12 DIAGNOSIS — Z23 NEED FOR INFLUENZA VACCINATION: Primary | ICD-10-CM

## 2021-10-12 PROCEDURE — 90694 VACC AIIV4 NO PRSRV 0.5ML IM: CPT | Performed by: INTERNAL MEDICINE

## 2021-10-12 PROCEDURE — G0008 ADMIN INFLUENZA VIRUS VAC: HCPCS | Performed by: INTERNAL MEDICINE

## 2021-10-29 ENCOUNTER — TELEPHONE (OUTPATIENT)
Dept: INTERNAL MEDICINE | Facility: CLINIC | Age: 76
End: 2021-10-29

## 2021-10-29 NOTE — TELEPHONE ENCOUNTER
Pt would like your advise on covid booster  Pt received moderna vaccine   When she had a bad reaction from second dose (vertigo and ear noise) and seen by natanael connors last march for this  Pt would now like to know which booster she should get if any  What do you recommend

## 2021-11-22 ENCOUNTER — IMMUNIZATION (OUTPATIENT)
Dept: IMMUNIZATION | Facility: CLINIC | Age: 76
End: 2021-11-22
Payer: MEDICARE

## 2021-11-22 PROCEDURE — 0064A SARS-COV-2 VACCINE BOOSTER MODERNA: CPT | Performed by: FAMILY MEDICINE

## 2021-11-22 PROCEDURE — 91306 SARS-COV-2 VACCINE BOOSTER MODERNA: CPT | Performed by: FAMILY MEDICINE

## 2021-12-09 ENCOUNTER — TELEPHONE (OUTPATIENT)
Dept: INTERNAL MEDICINE | Facility: CLINIC | Age: 76
End: 2021-12-09
Payer: MEDICARE

## 2021-12-09 DIAGNOSIS — E03.8 OTHER SPECIFIED HYPOTHYROIDISM: Primary | ICD-10-CM

## 2021-12-09 NOTE — TELEPHONE ENCOUNTER
Patient is requesting thyroid testing.  She will go to [facility].  Please call when labs are ordered.

## 2021-12-14 ENCOUNTER — TELEMEDICINE (OUTPATIENT)
Dept: INTERNAL MEDICINE | Facility: CLINIC | Age: 76
End: 2021-12-14
Payer: MEDICARE

## 2021-12-14 DIAGNOSIS — J06.9 UPPER RESPIRATORY TRACT INFECTION, UNSPECIFIED TYPE: Primary | ICD-10-CM

## 2021-12-14 PROCEDURE — 99213 OFFICE O/P EST LOW 20 MIN: CPT | Mod: 95 | Performed by: NURSE PRACTITIONER

## 2021-12-14 RX ORDER — PROMETHAZINE HYDROCHLORIDE AND DEXTROMETHORPHAN HYDROBROMIDE 6.25; 15 MG/5ML; MG/5ML
5 SYRUP ORAL NIGHTLY PRN
Qty: 180 ML | Refills: 0 | Status: SHIPPED | OUTPATIENT
Start: 2021-12-14 | End: 2021-12-24

## 2021-12-14 ASSESSMENT — ENCOUNTER SYMPTOMS
ARTHRALGIAS: 0
ABDOMINAL PAIN: 0
COUGH: 1
RHINORRHEA: 1
CHILLS: 0
WEAKNESS: 0
SHORTNESS OF BREATH: 0
SORE THROAT: 0
SINUS PAIN: 0
HEADACHES: 0
MYALGIAS: 0
FEVER: 0
SINUS PRESSURE: 0
DIARRHEA: 0
FREQUENCY: 0
CONSTIPATION: 0
LIGHT-HEADEDNESS: 0
DYSURIA: 0
VOMITING: 0
EYE PAIN: 0
TROUBLE SWALLOWING: 0
EYE REDNESS: 0
FATIGUE: 1
PALPITATIONS: 0
WHEEZING: 0
NAUSEA: 0
CHEST TIGHTNESS: 0
DIZZINESS: 0
ADENOPATHY: 0

## 2021-12-14 NOTE — PROGRESS NOTES
Verification of Patient Location:  The patient affirms they are currently located in the following state: [place]    Request for Consent:    Audio and Video Encounter   [place], my name is NOE Araiza.  Before we proceed, can you please verify your identification by telling me your full name and date of birth?  Can you tell me who is in the room with you?    You and I are about to have a telemedicine check-in or visit because you have requested it.  This is a live video-conference.  I am a real person, speaking to you in real time.  There is no one else with me on the video-conference.  However, when we use ([employer], [employer], etc) it is important for you to know that the video-conference may not be secure or private.  I am not recording this conversation and I am asking you not to record it.  This telemedicine visit will be billed to your health insurance or you, if you are self-insured.  You understand you will be responsible for any copayments or coinsurances that apply to your telemedicine visit.  Communication platform used for this encounter:  [facility]     Before starting our telemedicine visit, I am required to get your consent for this virtual check-in or visit by telemedicine. Do you consent?      Patient Response to Request for Consent:  Yes      Visit Documentation:  Subjective     Patient ID: Katheryn Goncalves is a 76 y.o. female.  1945      76-year-old female with a past medical history of hypothyroidism presents for a telemedicine video visit today.  Patient reports she developed a cough along with a runny nose and feeling tired this past Saturday.  She reports her grandchild was also sick and treated for a double ear infection.  Patient reports that she is fully vaccinated for COVID-19 along with the booster and to get a flu shot.  She reports the baby is fine.  Denies fever, chills, body aches, loss of smell or taste, abdominal pain, nausea, vomiting, diarrhea, abdominal pain.  She  denies any COVID-19 contacts.  She reports her symptoms started after her grandchild symptoms started.      The following have been reviewed and updated as appropriate in this visit:   Allergies  Meds  Problems       Review of Systems   Constitutional: Positive for fatigue. Negative for chills and fever.   HENT: Positive for rhinorrhea. Negative for congestion, ear discharge, ear pain, postnasal drip, sinus pressure, sinus pain, sore throat and trouble swallowing.    Eyes: Negative for pain and redness.   Respiratory: Positive for cough. Negative for chest tightness, shortness of breath and wheezing.    Cardiovascular: Negative for chest pain and palpitations.   Gastrointestinal: Negative for abdominal pain, constipation, diarrhea, nausea and vomiting.   Genitourinary: Negative for dysuria, frequency and urgency.   Musculoskeletal: Negative for arthralgias and myalgias.   Skin: Negative for rash.   Neurological: Negative for dizziness, weakness, light-headedness and headaches.   Hematological: Negative for adenopathy.         Assessment/Plan   Diagnoses and all orders for this visit:    Upper respiratory tract infection, unspecified type (Primary)  Assessment & Plan:  Likely viral syndrome.  Will start patient on course of Mucinex DM during the day and may take Promethazine DM at night so she may also get some sleep.  Patient also advised to use Flonase as directed.  Increase fluids, rest.  Patient advised to call if her symptoms persist or worsen.  She declined COVID-19 testing      Other orders  -     promethazine-DM 6.25-15 mg/5 mL syrup; Take 5 mL by mouth nightly as needed for cough for up to 10 days.      Time Spent:  I spent 15 minutes on this date of service performing the following activities: obtaining history, entering orders, documenting, preparing for visit and providing counseling and education.

## 2021-12-14 NOTE — ASSESSMENT & PLAN NOTE
Likely viral syndrome.  Will start patient on course of Mucinex DM during the day and may take Promethazine DM at night so she may also get some sleep.  Patient also advised to use Flonase as directed.  Increase fluids, rest.  Patient advised to call if her symptoms persist or worsen.  She declined COVID-19 testing

## 2021-12-16 ENCOUNTER — CLINICAL SUPPORT (OUTPATIENT)
Dept: INTERNAL MEDICINE | Facility: CLINIC | Age: 76
End: 2021-12-16
Payer: MEDICARE

## 2021-12-16 DIAGNOSIS — R05.9 COUGH: Primary | ICD-10-CM

## 2021-12-16 LAB
EXPIRATION DATE: NORMAL
Lab: NORMAL
POCT MANUFACTURER: NORMAL
RAPID INFLUENZA A AGN: NEGATIVE
RAPID INFLUENZA B AGN: NEGATIVE

## 2021-12-16 PROCEDURE — 87804 INFLUENZA ASSAY W/OPTIC: CPT | Mod: QW | Performed by: NURSE PRACTITIONER

## 2021-12-16 PROCEDURE — 200200 PR NO CHARGE: Performed by: NURSE PRACTITIONER

## 2021-12-18 LAB — SARS-COV-2 RNA RESP QL NAA+PROBE: NOT DETECTED

## 2021-12-20 ENCOUNTER — TELEPHONE (OUTPATIENT)
Dept: INTERNAL MEDICINE | Facility: CLINIC | Age: 76
End: 2021-12-20
Payer: MEDICARE

## 2022-01-18 ENCOUNTER — APPOINTMENT (OUTPATIENT)
Dept: LAB | Facility: CLINIC | Age: 77
End: 2022-01-18
Attending: INTERNAL MEDICINE
Payer: MEDICARE

## 2022-01-18 DIAGNOSIS — E03.8 OTHER SPECIFIED HYPOTHYROIDISM: ICD-10-CM

## 2022-01-18 LAB
T4 FREE SERPL-MCNC: 0.74 NG/DL (ref 0.58–1.64)
TSH SERPL DL<=0.05 MIU/L-ACNC: 1.29 MIU/L (ref 0.34–5.6)

## 2022-01-18 PROCEDURE — 36415 COLL VENOUS BLD VENIPUNCTURE: CPT

## 2022-01-18 PROCEDURE — 84443 ASSAY THYROID STIM HORMONE: CPT

## 2022-01-18 PROCEDURE — 84439 ASSAY OF FREE THYROXINE: CPT

## 2022-01-19 ENCOUNTER — TELEPHONE (OUTPATIENT)
Dept: INTERNAL MEDICINE | Facility: CLINIC | Age: 77
End: 2022-01-19
Payer: COMMERCIAL

## 2022-01-19 NOTE — TELEPHONE ENCOUNTER
Pt called to let you know she had her thyroid blood work done yesterday [facility]

## 2022-01-24 RX ORDER — LEVOTHYROXINE SODIUM 88 UG/1
TABLET ORAL
Qty: 90 TABLET | Refills: 3 | Status: SHIPPED | OUTPATIENT
Start: 2022-01-24 | End: 2023-01-27

## 2022-01-24 NOTE — TELEPHONE ENCOUNTER
Medicine Refill Request    Last Office: 6/22/2021   Last Consult Visit: 5/30/2019  Last Telemedicine Visit: 12/14/2021 Isrrael Piña CRNP    Next Appointment: 5/23/2022      Current Outpatient Medications:   •  acetaminophen (TYLENOL) 500 mg tablet, Take 500 mg by mouth every 6 (six) hours as needed for mild pain (Takes PRN only)., Disp: , Rfl:   •  atorvastatin (LIPITOR) 20 mg tablet, Take 1 tablet (20 mg total) by mouth once daily., Disp: 90 tablet, Rfl: 3  •  denosumab (PROLIA) 60 mg/mL syringe, Inject 60 mg under the skin once. Injection 2 x yearly, Disp: , Rfl:   •  esomeprazole (NexIUM) 40 mg capsule, Take 1 capsule (40 mg total) by mouth daily., Disp: 90 capsule, Rfl: 0  •  estradioL (ESTRACE) 0.01 % (0.1 mg/gram) vaginal cream, Insert 2 g into the vagina 2 (two) times a week (Mon, Thu)., Disp: , Rfl:   •  levothyroxine (SYNTHROID) 88 mcg tablet, Take 1 tablet (88 mcg total) by mouth once daily., Disp: 90 tablet, Rfl: 3      BP Readings from Last 3 Encounters:   08/03/21 (!) 149/87   06/22/21 134/82   05/10/21 122/72       Recent Lab results:  Lab Results   Component Value Date    CHOL 152 05/04/2021   ,   Lab Results   Component Value Date    HDL 59 05/04/2021   ,   Lab Results   Component Value Date    LDLCALC 82 05/04/2021   ,   Lab Results   Component Value Date    TRIG 56 05/04/2021        Lab Results   Component Value Date    GLUCOSE 98 05/04/2021   , No results found for: HGBA1C      Lab Results   Component Value Date    CREATININE 0.7 05/04/2021       Lab Results   Component Value Date    TSH 1.29 01/18/2022

## 2022-02-07 ENCOUNTER — TELEPHONE (OUTPATIENT)
Dept: INTERNAL MEDICINE | Facility: CLINIC | Age: 77
End: 2022-02-07
Payer: COMMERCIAL

## 2022-02-07 NOTE — TELEPHONE ENCOUNTER
The patient called stating that she has been having difficulty sleeping over 20 years but she never felt like it interrupted her life.  The patient states that she is able to fall asleep but does wake up approx. 10x per night usually to go to the bathroom.  The patient states she does go back to sleep but not right away and is usually surfing through channels on the TV before falling back asleep.  The patient states that she did discuss this once in the past with Dr. Velasquez but didn't pursue anything at that time.  The patient stated her brother has sleep apnea but she doesn't feel she would wear a CPAP machine so not sure she would want to do a sleep study.  The patient wanted to know if there is any mild over the counter medications she can take to help her.  She does have an appt scheduled for May 2022 and will discuss further at that time but thought she would call to see if anything she can do in the meantime.

## 2022-02-15 RX ORDER — ATORVASTATIN CALCIUM 20 MG/1
TABLET, FILM COATED ORAL
Qty: 90 TABLET | Refills: 3 | Status: SHIPPED | OUTPATIENT
Start: 2022-02-15 | End: 2023-02-16

## 2022-02-15 NOTE — TELEPHONE ENCOUNTER
Medicine Refill Request    Last Office: 6/22/2021   Last Consult Visit: 5/30/2019  Last Telemedicine Visit: 12/14/2021 Isrrael Piña CRNP    Next Appointment: 5/23/2022      Current Outpatient Medications:   •  acetaminophen (TYLENOL) 500 mg tablet, Take 500 mg by mouth every 6 (six) hours as needed for mild pain (Takes PRN only)., Disp: , Rfl:   •  atorvastatin (LIPITOR) 20 mg tablet, Take 1 tablet (20 mg total) by mouth once daily., Disp: 90 tablet, Rfl: 3  •  denosumab (PROLIA) 60 mg/mL syringe, Inject 60 mg under the skin once. Injection 2 x yearly, Disp: , Rfl:   •  esomeprazole (NexIUM) 40 mg capsule, Take 1 capsule (40 mg total) by mouth daily., Disp: 90 capsule, Rfl: 0  •  estradioL (ESTRACE) 0.01 % (0.1 mg/gram) vaginal cream, Insert 2 g into the vagina 2 (two) times a week (Mon, Thu)., Disp: , Rfl:   •  levothyroxine 88 mcg tablet, TAKE 1 TABLET BY MOUTH EVERY DAY, Disp: 90 tablet, Rfl: 3      BP Readings from Last 3 Encounters:   08/03/21 (!) 149/87   06/22/21 134/82   05/10/21 122/72       Recent Lab results:  Lab Results   Component Value Date    CHOL 152 05/04/2021   ,   Lab Results   Component Value Date    HDL 59 05/04/2021   ,   Lab Results   Component Value Date    LDLCALC 82 05/04/2021   ,   Lab Results   Component Value Date    TRIG 56 05/04/2021        Lab Results   Component Value Date    GLUCOSE 98 05/04/2021   , No results found for: HGBA1C      Lab Results   Component Value Date    CREATININE 0.7 05/04/2021       Lab Results   Component Value Date    TSH 1.29 01/18/2022

## 2022-04-26 ENCOUNTER — OFFICE VISIT (OUTPATIENT)
Dept: INTERNAL MEDICINE | Facility: CLINIC | Age: 77
End: 2022-04-26
Payer: MEDICARE

## 2022-04-26 VITALS
BODY MASS INDEX: 24.16 KG/M2 | SYSTOLIC BLOOD PRESSURE: 116 MMHG | TEMPERATURE: 97.9 F | HEIGHT: 65 IN | RESPIRATION RATE: 16 BRPM | WEIGHT: 145 LBS | OXYGEN SATURATION: 98 % | HEART RATE: 67 BPM | DIASTOLIC BLOOD PRESSURE: 64 MMHG

## 2022-04-26 DIAGNOSIS — D64.9 ANEMIA, UNSPECIFIED TYPE: ICD-10-CM

## 2022-04-26 DIAGNOSIS — E53.8 VITAMIN B12 DEFICIENCY: ICD-10-CM

## 2022-04-26 DIAGNOSIS — R35.1 NOCTURIA: ICD-10-CM

## 2022-04-26 DIAGNOSIS — E03.2 HYPOTHYROIDISM DUE TO MEDICATION: ICD-10-CM

## 2022-04-26 DIAGNOSIS — E78.5 HYPERLIPIDEMIA, UNSPECIFIED HYPERLIPIDEMIA TYPE: ICD-10-CM

## 2022-04-26 DIAGNOSIS — R42 LIGHTHEADEDNESS: Primary | ICD-10-CM

## 2022-04-26 PROCEDURE — 99214 OFFICE O/P EST MOD 30 MIN: CPT | Performed by: INTERNAL MEDICINE

## 2022-04-26 ASSESSMENT — ENCOUNTER SYMPTOMS
ABDOMINAL PAIN: 0
COUGH: 0
FREQUENCY: 1
SHORTNESS OF BREATH: 0
VOICE CHANGE: 0
TROUBLE SWALLOWING: 0
HEADACHES: 0
UNEXPECTED WEIGHT CHANGE: 0
FEVER: 0
DIZZINESS: 1
LIGHT-HEADEDNESS: 1
SLEEP DISTURBANCE: 1
DIFFICULTY URINATING: 0

## 2022-04-26 NOTE — PATIENT INSTRUCTIONS
Increase fluid intake 6-8 cups a day, plus more if drinking coffee/tea    Take Claritin daily.  Saline nasal spray - [employer] canister

## 2022-04-26 NOTE — ASSESSMENT & PLAN NOTE
Waking up every 2 hours to urinate small amounts.  Advised to work on bladder training to sleep longer periods in between wakings.  Advised to stay hydrated during the day

## 2022-04-26 NOTE — ASSESSMENT & PLAN NOTE
No spinning vertigo.  Symptoms developed after recent URI.  Possible dehydration.  Advised to increase hydration significantly.  Can also take Claritin and use saline nasal spray for allergy symptoms.  If symptoms worsen or are persistent, will start further work-up.  MRI brain negative within the year

## 2022-04-26 NOTE — PROGRESS NOTES
Subjective      Patient ID: Katheryn Goncalves is a 76 y.o. female.    HPI    Patient new to me.  Dr. Velasquez's patient.  Patient presents with c/o mild lightheadedness/dizziness.  Initially developed vertigo 4 days after her second Moderna vaccine.  Saw NOE Taylor then Dr. Cole, ENT in [place]. Treated with medications for about 1-2 months. MRI brain at the time was negative.  About 3 weeks ago, caught a cold from granddaughter. Since then, has noticed mild instability with walking.  Gradually feeling better.  Has an appt with Dr. Cole, ENT in the next 1-2 weeks.  No falls. Still able to take care of her granddaughter.  Feeling tired in general.  Does not sleep well. Waking up about every 2 hrs to urinate. Small amount. Tried melatonin which didn't help.  Does not drink much water during the day.  Has 3 cups of coffee in the morning and iced tea with lunch. Drinks water, about 2 cups a day.  Has old h/o low Vit B12. Not aware. Does not take any supplements. Has an appt with Dr. Bragg, general surgeon for R.femoral hernia repair with mesh.  Had emergency surgery about 4 years ago for incarcerated hernia.  No other new/acute concerns.  Has a follow-up appointment with her PCP, Dr. Velasquez next month.      The following have been reviewed and updated as appropriate in this visit:     Allergies  Meds  Problems           Past Medical History:   Diagnosis Date   • Marshall esophagus    • High cholesterol    • Hypothyroidism    • Osteoporosis      Past Surgical History:   Procedure Laterality Date   • CHOLECYSTECTOMY     • FEMORAL HERNIA REPAIR Right     bowel dusky but OK - no resection   • TOTAL VAGINAL HYSTERECTOMY  1997     Family History   Problem Relation Age of Onset   • Heart disease Biological Father    • Colon cancer Biological Mother      Social History     Socioeconomic History   • Marital status:      Spouse name: None   • Number of children: None   • Years of education: None   • Highest  "education level: None   Tobacco Use   • Smoking status: Never Smoker   • Smokeless tobacco: Never Used   Vaping Use   • Vaping Use: Never used   Substance and Sexual Activity   • Alcohol use: No   • Drug use: No   • Sexual activity: Never       Review of Systems   Constitutional: Negative for fever and unexpected weight change.   HENT: Positive for congestion. Negative for trouble swallowing and voice change.    Respiratory: Negative for cough and shortness of breath.    Cardiovascular: Negative for chest pain.   Gastrointestinal: Negative for abdominal pain.   Genitourinary: Positive for frequency (nocturia). Negative for difficulty urinating.   Neurological: Positive for dizziness and light-headedness. Negative for headaches.   Psychiatric/Behavioral: Positive for sleep disturbance.       No Known Allergies  Current Outpatient Medications   Medication Sig Dispense Refill   • acetaminophen (TYLENOL) 500 mg tablet Take 500 mg by mouth every 6 (six) hours as needed for mild pain (Takes PRN only).     • atorvastatin (LIPITOR) 20 mg tablet TAKE 1 TABLET BY MOUTH EVERY DAY 90 tablet 3   • denosumab (PROLIA) 60 mg/mL syringe Inject 60 mg under the skin once. Injection 2 x yearly     • esomeprazole (NexIUM) 40 mg capsule Take 1 capsule (40 mg total) by mouth daily. 90 capsule 0   • estradioL (ESTRACE) 0.01 % (0.1 mg/gram) vaginal cream Insert 2 g into the vagina 2 (two) times a week (Mon, Thu).     • levothyroxine 88 mcg tablet TAKE 1 TABLET BY MOUTH EVERY DAY 90 tablet 3     No current facility-administered medications for this visit.       Objective   Vitals:    04/26/22 1133   BP: 116/64   Pulse: 67   Resp: 16   Temp: 36.6 °C (97.9 °F)   SpO2: 98%   Weight: 65.8 kg (145 lb)   Height: 1.651 m (5' 5\")     Body mass index is 24.13 kg/m².    Physical Exam  Constitutional:       Appearance: Normal appearance. She is well-developed.   HENT:      Head: Normocephalic and atraumatic.      Right Ear: Tympanic membrane, ear canal " and external ear normal.      Left Ear: Tympanic membrane, ear canal and external ear normal.      Mouth/Throat:      Mouth: Mucous membranes are moist.      Pharynx: Oropharynx is clear.   Eyes:      General: Lids are normal.      Extraocular Movements: Extraocular movements intact.      Conjunctiva/sclera: Conjunctivae normal.      Pupils: Pupils are equal, round, and reactive to light.   Cardiovascular:      Rate and Rhythm: Normal rate and regular rhythm.      Heart sounds: Normal heart sounds, S1 normal and S2 normal. No murmur heard.  Pulmonary:      Effort: Pulmonary effort is normal.      Breath sounds: Normal breath sounds. No decreased breath sounds, rhonchi or rales.   Abdominal:      General: Bowel sounds are normal.      Palpations: Abdomen is soft.      Tenderness: There is no abdominal tenderness.   Musculoskeletal:         General: Normal range of motion.      Cervical back: Neck supple.   Skin:     General: Skin is warm and dry.   Neurological:      General: No focal deficit present.      Mental Status: She is alert and oriented to person, place, and time.      Cranial Nerves: No cranial nerve deficit.      Sensory: No sensory deficit.      Motor: No weakness.      Gait: Gait normal.   Psychiatric:         Mood and Affect: Mood normal.         Behavior: Behavior normal.         Thought Content: Thought content normal.         Judgment: Judgment normal.         Assessment/Plan   Problem List Items Addressed This Visit        Digestive    Vitamin B12 deficiency     Will check labs           Relevant Orders    Vitamin B12       Endocrine/Metabolic    Hypothyroidism     Takes levothyroxine.  Feeling more tired lately.  Due for follow-up labs           Relevant Orders    TSH w reflex FT4    TSH 3rd Generation    Hyperlipidemia     Takes atorvastatin.  Due for labs           Relevant Orders    Comprehensive metabolic panel    Lipid panel       Hematologic    Anemia     History of anemia.  We will check  labs           Relevant Orders    CBC and Differential       Other    Nocturia     Waking up every 2 hours to urinate small amounts.  Advised to work on bladder training to sleep longer periods in between wakings.  Advised to stay hydrated during the day           Lightheadedness - Primary     No spinning vertigo.  Symptoms developed after recent URI.  Possible dehydration.  Advised to increase hydration significantly.  Can also take Claritin and use saline nasal spray for allergy symptoms.  If symptoms worsen or are persistent, will start further work-up.  MRI brain negative within the year                 Maritza Navarro MD    4/26/2022

## 2022-04-27 ENCOUNTER — PREP FOR CASE (OUTPATIENT)
Dept: SURGERY | Facility: CLINIC | Age: 77
End: 2022-04-27
Payer: MEDICARE

## 2022-04-27 DIAGNOSIS — Z01.818 PRE-OP EXAMINATION: Primary | ICD-10-CM

## 2022-05-10 ENCOUNTER — TRANSCRIBE ORDERS (OUTPATIENT)
Dept: SCHEDULING | Facility: REHABILITATION | Age: 77
End: 2022-05-10

## 2022-05-10 DIAGNOSIS — R42 DIZZINESS AND GIDDINESS: Primary | ICD-10-CM

## 2022-05-10 DIAGNOSIS — H83.09 LABYRINTHITIS, UNSPECIFIED EAR: ICD-10-CM

## 2022-05-10 DIAGNOSIS — H92.03 OTALGIA, BILATERAL: ICD-10-CM

## 2022-05-11 ENCOUNTER — APPOINTMENT (OUTPATIENT)
Dept: LAB | Facility: CLINIC | Age: 77
End: 2022-05-11
Attending: INTERNAL MEDICINE
Payer: MEDICARE

## 2022-05-11 DIAGNOSIS — D64.9 ANEMIA, UNSPECIFIED TYPE: ICD-10-CM

## 2022-05-11 DIAGNOSIS — E03.2 HYPOTHYROIDISM DUE TO MEDICATION: ICD-10-CM

## 2022-05-11 DIAGNOSIS — E53.8 VITAMIN B12 DEFICIENCY: ICD-10-CM

## 2022-05-11 DIAGNOSIS — E78.5 HYPERLIPIDEMIA, UNSPECIFIED HYPERLIPIDEMIA TYPE: ICD-10-CM

## 2022-05-11 LAB
ALBUMIN SERPL-MCNC: 3.9 G/DL (ref 3.4–5)
ALP SERPL-CCNC: 56 IU/L (ref 35–126)
ALT SERPL-CCNC: 19 IU/L (ref 11–54)
ANION GAP SERPL CALC-SCNC: 11 MEQ/L (ref 3–15)
AST SERPL-CCNC: 20 IU/L (ref 15–41)
BASOPHILS # BLD: 0.06 K/UL (ref 0.01–0.1)
BASOPHILS NFR BLD: 1.3 %
BILIRUB SERPL-MCNC: 0.7 MG/DL (ref 0.3–1.2)
BUN SERPL-MCNC: 10 MG/DL (ref 8–20)
CALCIUM SERPL-MCNC: 9.6 MG/DL (ref 8.9–10.3)
CHLORIDE SERPL-SCNC: 104 MEQ/L (ref 98–109)
CHOLEST SERPL-MCNC: 143 MG/DL
CO2 SERPL-SCNC: 25 MEQ/L (ref 22–32)
CREAT SERPL-MCNC: 0.7 MG/DL (ref 0.6–1.1)
DIFFERENTIAL METHOD BLD: NORMAL
EOSINOPHIL # BLD: 0.25 K/UL (ref 0.04–0.36)
EOSINOPHIL NFR BLD: 5.5 %
ERYTHROCYTE [DISTWIDTH] IN BLOOD BY AUTOMATED COUNT: 12 % (ref 11.7–14.4)
GFR SERPL CREATININE-BSD FRML MDRD: >60 ML/MIN/1.73M*2
GLUCOSE SERPL-MCNC: 97 MG/DL (ref 70–99)
HCT VFR BLDCO AUTO: 39.5 % (ref 35–45)
HDLC SERPL-MCNC: 61 MG/DL
HDLC SERPL: 2.3 {RATIO}
HGB BLD-MCNC: 13.9 G/DL (ref 11.8–15.7)
IMM GRANULOCYTES # BLD AUTO: 0.02 K/UL (ref 0–0.08)
IMM GRANULOCYTES NFR BLD AUTO: 0.4 %
LDLC SERPL CALC-MCNC: 75 MG/DL
LYMPHOCYTES # BLD: 1.44 K/UL (ref 1.2–3.5)
LYMPHOCYTES NFR BLD: 31.4 %
MCH RBC QN AUTO: 33.2 PG (ref 28–33.2)
MCHC RBC AUTO-ENTMCNC: 35.2 G/DL (ref 32.2–35.5)
MCV RBC AUTO: 94.3 FL (ref 83–98)
MONOCYTES # BLD: 0.36 K/UL (ref 0.28–0.8)
MONOCYTES NFR BLD: 7.9 %
NEUTROPHILS # BLD: 2.45 K/UL (ref 1.7–7)
NEUTS SEG NFR BLD: 53.5 %
NONHDLC SERPL-MCNC: 82 MG/DL
NRBC BLD-RTO: 0 %
PDW BLD AUTO: 9.6 FL (ref 9.4–12.3)
PLATELET # BLD AUTO: 207 K/UL (ref 150–369)
POTASSIUM SERPL-SCNC: 4.7 MEQ/L (ref 3.6–5.1)
PROT SERPL-MCNC: 5.4 G/DL (ref 6–8.2)
RBC # BLD AUTO: 4.19 M/UL (ref 3.93–5.22)
SODIUM SERPL-SCNC: 140 MEQ/L (ref 136–144)
TRIGL SERPL-MCNC: 35 MG/DL (ref 30–149)
TSH SERPL DL<=0.05 MIU/L-ACNC: 0.91 MIU/L (ref 0.34–5.6)
TSH SERPL DL<=0.05 MIU/L-ACNC: 0.91 MIU/L (ref 0.34–5.6)
VIT B12 SERPL-MCNC: 260 PG/ML (ref 180–914)
WBC # BLD AUTO: 4.58 K/UL (ref 3.8–10.5)

## 2022-05-11 PROCEDURE — 80053 COMPREHEN METABOLIC PANEL: CPT

## 2022-05-11 PROCEDURE — 85025 COMPLETE CBC W/AUTO DIFF WBC: CPT

## 2022-05-11 PROCEDURE — 80061 LIPID PANEL: CPT

## 2022-05-11 PROCEDURE — 84443 ASSAY THYROID STIM HORMONE: CPT | Mod: 59

## 2022-05-11 PROCEDURE — 36415 COLL VENOUS BLD VENIPUNCTURE: CPT

## 2022-05-11 PROCEDURE — 84443 ASSAY THYROID STIM HORMONE: CPT

## 2022-05-11 PROCEDURE — 82607 VITAMIN B-12: CPT

## 2022-05-23 ENCOUNTER — OFFICE VISIT (OUTPATIENT)
Dept: INTERNAL MEDICINE | Facility: CLINIC | Age: 77
End: 2022-05-23
Payer: MEDICARE

## 2022-05-23 VITALS
DIASTOLIC BLOOD PRESSURE: 68 MMHG | BODY MASS INDEX: 23.66 KG/M2 | SYSTOLIC BLOOD PRESSURE: 130 MMHG | TEMPERATURE: 98.8 F | HEIGHT: 65 IN | OXYGEN SATURATION: 99 % | HEART RATE: 52 BPM | WEIGHT: 142 LBS

## 2022-05-23 DIAGNOSIS — Z12.31 ENCOUNTER FOR SCREENING MAMMOGRAM FOR MALIGNANT NEOPLASM OF BREAST: ICD-10-CM

## 2022-05-23 DIAGNOSIS — Z00.00 HEALTHCARE MAINTENANCE: ICD-10-CM

## 2022-05-23 DIAGNOSIS — E55.9 VITAMIN D DEFICIENCY: ICD-10-CM

## 2022-05-23 DIAGNOSIS — E03.9 HYPOTHYROIDISM, UNSPECIFIED TYPE: ICD-10-CM

## 2022-05-23 DIAGNOSIS — E78.2 MIXED HYPERLIPIDEMIA: ICD-10-CM

## 2022-05-23 DIAGNOSIS — E88.09 PROTEINS SERUM PLASMA LOW: ICD-10-CM

## 2022-05-23 DIAGNOSIS — R26.89 IMBALANCE: Primary | ICD-10-CM

## 2022-05-23 DIAGNOSIS — N30.00 ACUTE CYSTITIS WITHOUT HEMATURIA: ICD-10-CM

## 2022-05-23 DIAGNOSIS — K40.90 RIGHT INGUINAL HERNIA: ICD-10-CM

## 2022-05-23 DIAGNOSIS — H93.11 TINNITUS OF RIGHT EAR: ICD-10-CM

## 2022-05-23 DIAGNOSIS — M81.0 AGE-RELATED OSTEOPOROSIS WITHOUT CURRENT PATHOLOGICAL FRACTURE: ICD-10-CM

## 2022-05-23 PROCEDURE — 99214 OFFICE O/P EST MOD 30 MIN: CPT | Performed by: INTERNAL MEDICINE

## 2022-05-23 ASSESSMENT — ENCOUNTER SYMPTOMS
HEMATURIA: 0
PALPITATIONS: 0
ABDOMINAL PAIN: 0
HEADACHES: 0
CHEST TIGHTNESS: 0
POLYDIPSIA: 0
UNEXPECTED WEIGHT CHANGE: 0
CONSTIPATION: 0
WEAKNESS: 0
FREQUENCY: 1
DIARRHEA: 0
SHORTNESS OF BREATH: 0
DYSURIA: 0
DIZZINESS: 0
POLYPHAGIA: 0

## 2022-05-23 NOTE — PROGRESS NOTES
Subjective      Patient ID: Katheryn Goncalves is a 76 y.o. female.    HPI  She is here for follow up - had her prolia injection yesterday for her osteoporosis.  ReMains active.  Continues on vitamin D supplementation and dietary calcium.Sees Dr. Olivas.  Has had intermittent problems with her balance and lightheadedness.  April of last year had ENT evaluation with Dr. Kelsey luicano and was noted to have decreased hearing and will be getting hearing aids.  Was felt that she had labyrinthitis which is currently resolved.  Recently approximately weeks ago she experienced a similar sense of imbalance.  This occurred after having an upper respiratory infection which she thinks she acquired from her grandchildren.  Was COVID-negative.  Returned to see Dr. Cole who, given the recurrent nature of her imbalance, wants her to go to vestibular therapy.  MRI within normal limits within the past year.  Has an upcoming right femoral hernia repair scheduled with Dr. Christiansen.  Currently is not having any abdominal pain, nausea, vomiting or change in bowel habits.  She does have a history of a an incarcerated right femoral hernia repaired 4 year ago (Dr. Green - open repair).  SHe does have some urinary frequency and nocturia.  No dysuria.  Has a history of a vaginal sling and vaginal sling revision in 2020.  Prior vaginal hysterectomy.   Still he had her blood work done for my review.  Overall is stable lipid profile at goal.  She does have some decrease in her total protein.  Denies any history of proteinuria no foamy urine.  She is not vegetarian.    The following have been reviewed and updated as appropriate in this visit:   Allergies  Meds  Problems         Past Medical History:   Diagnosis Date   • Marshall esophagus    • High cholesterol    • Hypothyroidism    • Osteoporosis      Past Surgical History:   Procedure Laterality Date   • CHOLECYSTECTOMY     • FEMORAL HERNIA REPAIR Right     bowel dusky but OK - no resection   •  TOTAL VAGINAL HYSTERECTOMY  1997     Family History   Problem Relation Age of Onset   • Heart disease Biological Father    • Colon cancer Biological Mother      Social History     Socioeconomic History   • Marital status:      Spouse name: Not on file   • Number of children: Not on file   • Years of education: Not on file   • Highest education level: Not on file   Occupational History   • Not on file   Tobacco Use   • Smoking status: Never Smoker   • Smokeless tobacco: Never Used   Vaping Use   • Vaping Use: Never used   Substance and Sexual Activity   • Alcohol use: No   • Drug use: No   • Sexual activity: Never   Other Topics Concern   • Not on file   Social History Narrative   • Not on file     Social Determinants of Health     Financial Resource Strain: Not on file   Food Insecurity: Not on file   Transportation Needs: Not on file   Physical Activity: Not on file   Stress: Not on file   Social Connections: Not on file   Intimate Partner Violence: Not on file   Housing Stability: Not on file       Review of Systems   Constitutional: Negative for unexpected weight change.   Respiratory: Negative for chest tightness and shortness of breath.    Cardiovascular: Negative for chest pain, palpitations and leg swelling.   Gastrointestinal: Negative for abdominal pain, constipation and diarrhea.   Endocrine: Negative for polydipsia, polyphagia and polyuria.   Genitourinary: Positive for frequency. Negative for dysuria, hematuria and urgency.   Neurological: Negative for dizziness, weakness and headaches.       No Known Allergies  Current Outpatient Medications   Medication Sig Dispense Refill   • acetaminophen (TYLENOL) 500 mg tablet Take 500 mg by mouth every 6 (six) hours as needed for mild pain (Takes PRN only).     • atorvastatin (LIPITOR) 20 mg tablet TAKE 1 TABLET BY MOUTH EVERY DAY 90 tablet 3   • denosumab (PROLIA) 60 mg/mL syringe Inject 60 mg under the skin once. Injection 2 x yearly     • esomeprazole  "(NexIUM) 40 mg capsule Take 1 capsule (40 mg total) by mouth daily. 90 capsule 0   • estradioL (ESTRACE) 0.01 % (0.1 mg/gram) vaginal cream Insert 2 g into the vagina 2 (two) times a week (Mon, Thu).     • levothyroxine 88 mcg tablet TAKE 1 TABLET BY MOUTH EVERY DAY 90 tablet 3     No current facility-administered medications for this visit.       Objective   Vitals:    05/23/22 1130   BP: 130/68   Pulse: (!) 52   Temp: 37.1 °C (98.8 °F)   SpO2: 99%   Weight: 64.4 kg (142 lb)   Height: 1.651 m (5' 5\")       Physical Exam  Vitals and nursing note reviewed.   Constitutional:       Appearance: She is well-developed.   HENT:      Head: Normocephalic and atraumatic.   Neck:      Thyroid: No thyromegaly.      Vascular: No carotid bruit.   Cardiovascular:      Rate and Rhythm: Normal rate and regular rhythm.      Pulses: Normal pulses.      Heart sounds: Normal heart sounds. No murmur heard.  Pulmonary:      Effort: Pulmonary effort is normal.      Breath sounds: Normal breath sounds.   Abdominal:      General: Bowel sounds are normal.      Palpations: Abdomen is soft. There is no mass.      Tenderness: There is no abdominal tenderness.   Musculoskeletal:      Cervical back: Normal range of motion and neck supple.   Skin:     General: Skin is warm and dry.   Neurological:      Mental Status: She is alert and oriented to person, place, and time.   Psychiatric:         Behavior: Behavior normal.         Thought Content: Thought content normal.         Judgment: Judgment normal.         Assessment/Plan   Problem List Items Addressed This Visit     Hypothyroidism     Plan biochemically euthyroid continue levothyroxine           Hyperlipidemia     Profile at goal on atorvastatin           Healthcare maintenance     Viewed with her today.  I did advise Shingrix which is overdue.  She will obtain this at her pharmacy.  Colonoscopy upper endoscopy are up-to-date.  COVID vaccination series up-to-date along with her booster.  Advise " she get her fourth COVID booster prior to getting Shingrix and  by a month.  She is also due for an upcoming mammogram orders were put in for that.  Bone density is current.           Age-related osteoporosis without current pathological fracture     Continue on Prolia.  Bone density due next year.  Weightbearing exercise.  Calcium with vitamin D and vitamin D supplementation daily.  We will check a vitamin D level           Tinnitus of right ear     Is worsening hearing loss.  Will be going for hearing aids           Imbalance - Primary     Possible labyrinth dysfunction secondary to recent URI.  She had a similar episode last year.  We will however check her B12 level.  Advised her to keep well-hydrated.  She will go for vestibular therapy [facility]           Right inguinal hernia     Be stable for upcoming right inguinal hernia repair time with the mesh           Proteins serum plasma low     Protein intake does seem adequate.  Check urinalysis and urine protein creatinine ratio to evaluate for proteinuria             Other Visit Diagnoses     Acute cystitis without hematuria        Relevant Orders    Urinalysis with Reflex Culture (ED and Outpatient only)    Protein / creatinine ratio, urine    BI SCREENING MAMMOGRAM BILATERAL(TOMOSYNTHESIS)    Encounter for screening mammogram for malignant neoplasm of breast         Relevant Orders    BI SCREENING MAMMOGRAM BILATERAL(TOMOSYNTHESIS)    Vitamin D deficiency        Relevant Orders    Vitamin D 25 hydroxy          Sugar Means MD    5/24/2022

## 2022-05-24 PROBLEM — E88.09 PROTEINS SERUM PLASMA LOW: Status: ACTIVE | Noted: 2022-05-24

## 2022-05-24 PROBLEM — K40.90 RIGHT INGUINAL HERNIA: Status: ACTIVE | Noted: 2022-05-24

## 2022-05-24 PROBLEM — R26.89 IMBALANCE: Status: ACTIVE | Noted: 2022-05-24

## 2022-05-24 PROBLEM — R80.1 PERSISTENT PROTEINURIA: Status: RESOLVED | Noted: 2022-05-24 | Resolved: 2022-05-24

## 2022-05-24 PROBLEM — R80.1 PERSISTENT PROTEINURIA: Status: ACTIVE | Noted: 2022-05-24

## 2022-05-25 NOTE — ASSESSMENT & PLAN NOTE
Protein intake does seem adequate.  Check urinalysis and urine protein creatinine ratio to evaluate for proteinuria

## 2022-05-25 NOTE — ASSESSMENT & PLAN NOTE
Viewed with her today.  I did advise Shingrix which is overdue.  She will obtain this at her pharmacy.  Colonoscopy upper endoscopy are up-to-date.  COVID vaccination series up-to-date along with her booster.  Advise she get her fourth COVID booster prior to getting Shingrix and  by a month.  She is also due for an upcoming mammogram orders were put in for that.  Bone density is current.

## 2022-05-25 NOTE — ASSESSMENT & PLAN NOTE
Continue on Prolia.  Bone density due next year.  Weightbearing exercise.  Calcium with vitamin D and vitamin D supplementation daily.  We will check a vitamin D level

## 2022-05-25 NOTE — ASSESSMENT & PLAN NOTE
Possible labyrinth dysfunction secondary to recent URI.  She had a similar episode last year.  We will however check her B12 level.  Advised her to keep well-hydrated.  She will go for vestibular therapy [place]

## 2022-06-08 ENCOUNTER — APPOINTMENT (OUTPATIENT)
Dept: LAB | Facility: CLINIC | Age: 77
End: 2022-06-08
Attending: INTERNAL MEDICINE
Payer: MEDICARE

## 2022-06-08 ENCOUNTER — HOSPITAL ENCOUNTER (OUTPATIENT)
Dept: PHYSICAL THERAPY | Facility: CLINIC | Age: 77
Setting detail: THERAPIES SERIES
Discharge: HOME | End: 2022-06-08
Attending: OTOLARYNGOLOGY
Payer: MEDICARE

## 2022-06-08 ENCOUNTER — HOSPITAL ENCOUNTER (OUTPATIENT)
Dept: RADIOLOGY | Facility: CLINIC | Age: 77
Discharge: HOME | End: 2022-06-08
Attending: INTERNAL MEDICINE
Payer: MEDICARE

## 2022-06-08 DIAGNOSIS — N30.00 ACUTE CYSTITIS WITHOUT HEMATURIA: ICD-10-CM

## 2022-06-08 DIAGNOSIS — H83.09 LABYRINTHITIS, UNSPECIFIED EAR: ICD-10-CM

## 2022-06-08 DIAGNOSIS — R42 DIZZINESS AND GIDDINESS: ICD-10-CM

## 2022-06-08 DIAGNOSIS — E55.9 VITAMIN D DEFICIENCY: ICD-10-CM

## 2022-06-08 DIAGNOSIS — Z12.31 ENCOUNTER FOR SCREENING MAMMOGRAM FOR MALIGNANT NEOPLASM OF BREAST: ICD-10-CM

## 2022-06-08 DIAGNOSIS — H92.03 OTALGIA, BILATERAL: ICD-10-CM

## 2022-06-08 LAB
25(OH)D3 SERPL-MCNC: 69 NG/ML (ref 30–100)
BILIRUB UR QL STRIP.AUTO: NEGATIVE MG/DL
CLARITY UR REFRACT.AUTO: CLEAR
COLOR UR AUTO: YELLOW
CREAT UR-MCNC: 42 MG/DL
GLUCOSE UR STRIP.AUTO-MCNC: NEGATIVE MG/DL
HGB UR QL STRIP.AUTO: NEGATIVE
KETONES UR STRIP.AUTO-MCNC: NEGATIVE MG/DL
LEUKOCYTE ESTERASE UR QL STRIP.AUTO: NEGATIVE
NITRITE UR QL STRIP.AUTO: NEGATIVE
PH UR STRIP.AUTO: 7 [PH]
PROT UR QL STRIP.AUTO: NEGATIVE
PROT UR-MCNC: <6 MG/DL
PROT/CREAT UR: NORMAL MG/G{CREAT}
SP GR UR REFRACT.AUTO: 1.01
UROBILINOGEN UR STRIP-ACNC: 0.2 EU/DL

## 2022-06-08 PROCEDURE — 97112 NEUROMUSCULAR REEDUCATION: CPT | Mod: GP

## 2022-06-08 PROCEDURE — 97530 THERAPEUTIC ACTIVITIES: CPT | Mod: GP

## 2022-06-08 PROCEDURE — 97162 PT EVAL MOD COMPLEX 30 MIN: CPT | Mod: GP

## 2022-06-08 PROCEDURE — 82306 VITAMIN D 25 HYDROXY: CPT

## 2022-06-08 PROCEDURE — 36415 COLL VENOUS BLD VENIPUNCTURE: CPT

## 2022-06-08 PROCEDURE — 84156 ASSAY OF PROTEIN URINE: CPT

## 2022-06-08 PROCEDURE — 77063 BREAST TOMOSYNTHESIS BI: CPT

## 2022-06-08 PROCEDURE — 81003 URINALYSIS AUTO W/O SCOPE: CPT

## 2022-06-08 NOTE — Clinical Note
[address] [facility] [address]  [phone]      Dear DR. Childs,      Thank you for this referral. Please review the attached notes and plan of care for your approval.  Please contact our department with any questions.     Sincerely,     Sourav Peng, PT        Referring Provider: By co-signing this Plan of Care (POC) either electronically or physically you agree to the following:    I have reviewed the the Plan of Care established by the therapist within this document and certify that the services are skilled and medically necessary. I have reviewed the plan and recommend that these services continue to meet the goals stated in this document.       EXTERNAL PROVIDER FAXING BACK:    PHYSICIAN SIGNATURE: __________________________________     DATE: ___________________  TIME: _____________    IMPORTANT:  If returning this Plan of Care by fax, please fax back ONLY the signature page.   _________________________________________________________________________      [facility] Fax: [phone]        PT EVALUATION FOR OUTPATIENT THERAPY    Patient: Katheryn Goncalves  MRN: [id number]  : 1945 76 y.o.   Referring Physician: Jaydon Childs MD  Date of Visit: 2022      Certification Dates:  22 through 22         Recommended Frequency & Duration:  1 time/week for up to 8 weeks     Diagnosis:   1. Dizziness and giddiness    2. Labyrinthitis, unspecified ear    3. Otalgia, bilateral        Chief Complaints:   Chief Complaint   Patient presents with   • Difficulty Walking   • Balance Deficits   • Abnormality Of Gait   •  Decreased Community Integration   •  Imbalance       Precautions:      Past Medical History:   Past Medical History:   Diagnosis Date   • Marshall esophagus    • High cholesterol    • Hypothyroidism    • Osteoporosis        Past Surgical History:   Past Surgical History:   Procedure Laterality Date   • CHOLECYSTECTOMY     • FEMORAL HERNIA REPAIR Right      bowel dusky but OK - no resection   • TOTAL VAGINAL HYSTERECTOMY  1997         LEARNING ASSESSMENT    Assessment completed:  Yes    Learner name:  Katheryn Goncalves    Learner: Patient    Learning Barriers:  Learning barriers: No Barriers    Preferred Language: English     Needed: No    Education Provided:   Method: Discussion  Readiness: acceptance  Response: Demonstrated understanding      CO-LEARNER ASSESSMENT:    Completed: No            OBJECTIVE MEASUREMENTS/DATA:    Time In Session:  Start Time: 1000  Stop Time: 1100  Time Calculation (min): 60 min   Assessment and Plan - 06/08/22 1229        Assessment    Plan of Care reviewed and patient/family in agreement Yes     System Pathology/Pathophysiology Noted vestibular     Functional Limitations in Following Categories (PT Eval) community/leisure     Rehab Potential/Prognosis good, to achieve stated therapy goals     Problem List impaired balance;gaze stabilization     Clinical Assessment The patient is a 76F who presents for vestibular evaluation with chief complaint of imbalance during ambulation s/p URI in April. The vestibular-ocular exam was remarkable for 1st degree RBN, (+) head shaking test, and head thrust tests VOR provoked retinal slip during L head turn which indicates VOR insufficiency. Gaze evoked nystagmus was noted during R gaze, worse with visual fixation removed, indicating possible L sided vestibular hypofunction. This is confirmed with (+) head shaking test, with 3+ beats of L beating nystagmus, indicating L sided hypofunction. Positional testing for BPPV was deferred to do no complaints of dizziness. Motion sensitivity testing shows no visual motion sensitivity, scored as 0%. Balance testing will be completed at the next visit. ABC scored as 76% indicating mild perceived balance deficit, above cutoff for falls risk. The patient will benefit from physical therapy to normalize VOR and balance, increase functional tolerance, and return  to previous level of function, daily routine, hobbies and work.     Plan and Recommendations NV: FGA, initiate VOR, dynamic balance, and habituation     Planned Services CPT 74943 Canalith repositioning procedure/maneuvers;CPT 63077 Gait training;CPT 14767 Neuromuscular Reeducation;CPT 18991 Self-care/Home management training;CPT 31400 Therapeutic activities;CPT 64858 Therapeutic exercises;CPT 00064 Therapeutic Massage                  Pain/Vitals - 06/08/22 1021        Pain Assessment    Currently in pain No/Denies        Pain Intervention    Intervention  NA     Post Intervention Comments NA                Falls - 06/08/22 1021        Initial Falls Assessment    One or more falls in the last year No                PT - 06/08/22 1229        Physical Therapy    Physical Therapy Vestibular        PT Plan    Frequency of treatment 1 time/week     PT Duration 8 weeks     PT Cert From 06/08/22     PT Cert To 08/07/22     Date PT POC was sent to provider 06/08/22     Signed PT Plan of Care received?  No                   Living Environment    Living Environment - 06/08/22 1021        Living Environment    People in Home child(niki), adult     Living Arrangements apartment     Living Environment Comment Apt with son, has elevator access, walk-in shower     Transportation Concerns car, none               PLOF:    Prior Level of Function - 06/08/22 1021        OTHER    Previous level of function Patient fully independent PLOF               Sensory Tests    Sensory Testing - 06/08/22 1021        Sensory Assessment (Somatosensory)    Sensory Assessment (Somatosensory) LE sensation intact               Vestibular     PT Vestibular Evaluation - 06/08/22 1000        Imaging    Imaging studies MRI     Imaging comments WNL        Vestibular Symptoms    Symptoms Imbalance;Aural fullness;Hearing loss     Comments R sided hearing loss        Vestibular/Ocular    Corrective lenses Reading     Comments Reading and driving at night      "Eye ROM WNL     Convergence Vision WNL     Divergent Vision WNL     Saccades WNL     Vestibular Ocular Reflex (VOR) WNL     Spontaneous Evoked Nystagmus WNL     Gaze-Evoked Nystagmus Abnormal     Comments 1st deg RBN     Head Thrust Test Abnormal     Comments Corrective saccade noted during L head impulse        Frenzel's Exam    Spontaneous Nystagmus WNL     Gaze Evoked Nystagmus Abnormal   1st deg RBN, more pronounced with visual fixation removed    Head Shaking Test Abnormal     Comments 3+ beats of LBN indicating L sided hypofunction        Motion Sensitivity    Motion Sensitivity Quotient Percentage (%) 0 percent     Motion Sensitivity number of provoking positions 0        Other Outcome Measures    Activities Balance Confidence  76                  Goals     • [facility] PT Vestibular Goals      Vestibular PT Goals   Long Term Goals Time Frame Result Comment/Progress   Patient will increase FGA score to >/= 28/30 for increased gait stability and balance.   6-8 weeks     Patient will perform home exercise program independently.   6-8 weeks     Patient will demonstrate independence with managing any residual symptoms. 6-8 weeks     Patient will be able to walk recreationally without \"drifting\".  6-8 weeks     Patient will improve score on ABC to 87% for decreased report of symptoms with daily activities. 6-8 weeks     Patient will have no increased symptoms with challenging VOR activities for symptom free high level activities.   6-8 weeks                       TREATMENT PLAN:    VESTIBULAR PT FLOWSHEET    P.T. TREATMENT LOG   Precautions:    Eval Date: 6/8/22 Progress Note:    POC expires: 8/7/22 Insurance Limits:   Treatment Current Session Time   CANALITH  REPOSITIONING TREATMENT  (CPT 49891) Y/N Notes Not performed   CRT      NEURO RE-ED  (CPT 99476) Y/N Notes 8-22 Minutes   BPPV ASSESSMENT N No subjective c/o dizziness   Vestibulo oculomotor exam  Y (6/8) See objective section   VOR CANCELLATION                    "   Standing H/VVOR-C     Ambulation w/ H/VVOR-C     VOR / GAZE STABILITY     Seated H/VVOR Y Retinal slip during L head turn at SSV   Standing H/VVOR NV    Ambulation w/H/VVOR     H/VVOR on compliant surfaces     Functional VOR     HABITUATION     Ball circles     Repetitive functional movements     BALANCE- STATIC     On floor     Airex foam     Rockerboard     BALANCE- DYNAMIC     Ambulation-head turns/nods     Ambulation - 180 & 360 degree turns     Retro ambulation EO     Ambulation with EC     Obstacles     Tandem     OKS     MSQ  SOT  FGA  DVA Y    NV (6/8) 0%   THER-EX   (CPT 75490) Y/N SETS REPS LOAD Not performed   CARDIOVASCULAR               THER ACT  (CPT 07653) Y/N  8-22 Minutes   Review pain, meds, falls, vitals, symptoms Y    *Subjective Measures   ABC     Y   (6/8) 76%   Patient Education/HEP Y (6/8) Results of physical exam, central vs peripheral signs, vestibular system anatomy and physiology, VOR. Demonstrates good understanding.        Motion Sensitivity Quotient   DATE:    6/8/22       Baseline Level (0-5):        0            MSQ Score: 0%    POSITION INTENSITY ADJUSTED  INTENSITY DURATION SCORE   Sitting to Supine 0      Supine to Left Side Lying 0      Supine to Right Side Lying 0      Supine to Sit 0      Left Hallpike       to  Sitting 0      Right Hallpike       to Sitting 0      Sitting Nose to Left Knee 0      to Sitting 0      Sitting Nose to Right Knee 0      to Sitting       Sitting Head Rotation (4X) 0      Sitting Head Flex & Ext (4X) 0      Standing  360° Turn to Right 0      Standing 360° Turn to Left 0        Intensity: Scale 0-5 (0= no symptoms and 5= severe symptoms)  Adjusted Intensity: Intensity Level - Baseline Level  Duration: Scale from 0 to 3 for return to baseline (5-10”=1, 11-30”=2, >  30”=3)  Score: Adjusted Intensity + Duration  MOTION SENSITIVITY QUOTIENT % = # Provoking Positions X Total Score   X 100                                                                                                           2048   *If < 16 positions are tested, 2048 is replaced with 8 X (# of positions tested)2              ASSESSMENT:    This 76 y.o. year old female presents to PT with above stated diagnosis. Physical Therapy evaluation reveals impaired balance, gaze stabilization resulting in community/leisure limitations. Katheryn Goncalves will benefit from skilled PT services to address limitation, work towards rehab and patient goals and maximize PLOF of chosen ADLs.     Planned Services: The patient's treatment will include CPT 51907 Canalith repositioning procedure/maneuvers, CPT 45978 Gait training, CPT 15581 Neuromuscular Reeducation, CPT 64608 Self-care/Home management training, CPT 07048 Therapeutic activities, CPT 99344 Therapeutic exercises, CPT 20413 Therapeutic Massage, .

## 2022-06-08 NOTE — PROGRESS NOTES
Referring Provider: By co-signing this Plan of Care (POC) either electronically or physically you agree to the following:    I have reviewed the the Plan of Care established by the therapist within this document and certify that the services are skilled and medically necessary. I have reviewed the plan and recommend that these services continue to meet the goals stated in this document.       EXTERNAL PROVIDER FAXING BACK:    PHYSICIAN SIGNATURE: __________________________________     DATE: ___________________  TIME: _____________    IMPORTANT:  If returning this Plan of Care by fax, please fax back ONLY the signature page.   _________________________________________________________________________      [facility] Fax: [phone]        PT EVALUATION FOR OUTPATIENT THERAPY    Patient: Katheryn Goncalves  MRN: [id number]  : 1945 76 y.o.   Referring Physician: Jaydon Childs MD  Date of Visit: 2022      Certification Dates:  22 through 22         Recommended Frequency & Duration:  1 time/week for up to 8 weeks     Diagnosis:   1. Dizziness and giddiness    2. Labyrinthitis, unspecified ear    3. Otalgia, bilateral        Chief Complaints:   Chief Complaint   Patient presents with   • Difficulty Walking   • Balance Deficits   • Abnormality Of Gait   •  Decreased Community Integration   •  Imbalance       Precautions:      Past Medical History:   Past Medical History:   Diagnosis Date   • Marshall esophagus    • High cholesterol    • Hypothyroidism    • Osteoporosis        Past Surgical History:   Past Surgical History:   Procedure Laterality Date   • CHOLECYSTECTOMY     • FEMORAL HERNIA REPAIR Right     bowel dusky but OK - no resection   • TOTAL VAGINAL HYSTERECTOMY           LEARNING ASSESSMENT    Assessment completed:  Yes    Learner name:  Katheryn Goncalves    Learner: Patient    Learning Barriers:  Learning barriers: No Barriers    Preferred Language:  English     Needed: No    Education Provided:   Method: Discussion  Readiness: acceptance  Response: Demonstrated understanding      CO-LEARNER ASSESSMENT:    Completed: No            OBJECTIVE MEASUREMENTS/DATA:    Time In Session:  Start Time: 1000  Stop Time: 1100  Time Calculation (min): 60 min   Assessment and Plan - 06/08/22 1229        Assessment    Plan of Care reviewed and patient/family in agreement Yes     System Pathology/Pathophysiology Noted vestibular     Functional Limitations in Following Categories (PT Eval) community/leisure     Rehab Potential/Prognosis good, to achieve stated therapy goals     Problem List impaired balance;gaze stabilization     Clinical Assessment The patient is a 76F who presents for vestibular evaluation with chief complaint of imbalance during ambulation s/p URI in April. The vestibular-ocular exam was remarkable for 1st degree RBN, (+) head shaking test, and head thrust tests VOR provoked retinal slip during L head turn which indicates VOR insufficiency. Gaze evoked nystagmus was noted during R gaze, worse with visual fixation removed, indicating possible L sided vestibular hypofunction. This is confirmed with (+) head shaking test, with 3+ beats of L beating nystagmus, indicating L sided hypofunction. Positional testing for BPPV was deferred to do no complaints of dizziness. Motion sensitivity testing shows no visual motion sensitivity, scored as 0%. Balance testing will be completed at the next visit. ABC scored as 76% indicating mild perceived balance deficit, above cutoff for falls risk. The patient will benefit from physical therapy to normalize VOR and balance, increase functional tolerance, and return to previous level of function, daily routine, hobbies and work.     Plan and Recommendations NV: FGA, initiate VOR, dynamic balance, and habituation     Planned Services CPT 07824 Canalith repositioning procedure/maneuvers;CPT 48775 Gait training;CPT 94761  Neuromuscular Reeducation;CPT 94267 Self-care/Home management training;CPT 02269 Therapeutic activities;CPT 79853 Therapeutic exercises;CPT 43935 Therapeutic Massage                  Pain/Vitals - 06/08/22 1021        Pain Assessment    Currently in pain No/Denies        Pain Intervention    Intervention  NA     Post Intervention Comments NA                Falls - 06/08/22 1021        Initial Falls Assessment    One or more falls in the last year No                PT - 06/08/22 1229        Physical Therapy    Physical Therapy Vestibular        PT Plan    Frequency of treatment 1 time/week     PT Duration 8 weeks     PT Cert From 06/08/22     PT Cert To 08/07/22     Date PT POC was sent to provider 06/08/22     Signed PT Plan of Care received?  No                   Living Environment    Living Environment - 06/08/22 1021        Living Environment    People in Home child(niki), adult     Living Arrangements apartment     Living Environment Comment Apt with son, has elevator access, walk-in shower     Transportation Concerns car, none               PLOF:    Prior Level of Function - 06/08/22 1021        OTHER    Previous level of function Patient fully independent PLOF               Sensory Tests    Sensory Testing - 06/08/22 1021        Sensory Assessment (Somatosensory)    Sensory Assessment (Somatosensory) LE sensation intact               Vestibular     PT Vestibular Evaluation - 06/08/22 1000        Imaging    Imaging studies MRI     Imaging comments WNL        Vestibular Symptoms    Symptoms Imbalance;Aural fullness;Hearing loss     Comments R sided hearing loss        Vestibular/Ocular    Corrective lenses Reading     Comments Reading and driving at night     Eye ROM WNL     Convergence Vision WNL     Divergent Vision WNL     Saccades WNL     Vestibular Ocular Reflex (VOR) WNL     Spontaneous Evoked Nystagmus WNL     Gaze-Evoked Nystagmus Abnormal     Comments 1st deg RBN     Head Thrust Test Abnormal      "Comments Corrective saccade noted during L head impulse        Frenzel's Exam    Spontaneous Nystagmus WNL     Gaze Evoked Nystagmus Abnormal   1st deg RBN, more pronounced with visual fixation removed    Head Shaking Test Abnormal     Comments 3+ beats of LBN indicating L sided hypofunction        Motion Sensitivity    Motion Sensitivity Quotient Percentage (%) 0 percent     Motion Sensitivity number of provoking positions 0        Other Outcome Measures    Activities Balance Confidence  76                  Goals     • [facility] PT Vestibular Goals      Vestibular PT Goals   Long Term Goals Time Frame Result Comment/Progress   Patient will increase FGA score to >/= 28/30 for increased gait stability and balance.   6-8 weeks     Patient will perform home exercise program independently.   6-8 weeks     Patient will demonstrate independence with managing any residual symptoms. 6-8 weeks     Patient will be able to walk recreationally without \"drifting\".  6-8 weeks     Patient will improve score on ABC to 87% for decreased report of symptoms with daily activities. 6-8 weeks     Patient will have no increased symptoms with challenging VOR activities for symptom free high level activities.   6-8 weeks                       TREATMENT PLAN:    VESTIBULAR PT FLOWSHEET    P.T. TREATMENT LOG   Precautions:    Eval Date: 6/8/22 Progress Note:    POC expires: 8/7/22 Insurance Limits:   Treatment Current Session Time   CANALITH  REPOSITIONING TREATMENT  (CPT 69408) Y/N Notes Not performed   CRT      NEURO RE-ED  (CPT 99280) Y/N Notes 8-22 Minutes   BPPV ASSESSMENT N No subjective c/o dizziness   Vestibulo oculomotor exam  Y (6/8) See objective section   VOR CANCELLATION                      Standing H/VVOR-C     Ambulation w/ H/VVOR-C     VOR / GAZE STABILITY     Seated H/VVOR Y Retinal slip during L head turn at SSV   Standing H/VVOR NV    Ambulation w/H/VVOR     H/VVOR on compliant surfaces     Functional VOR     HABITUATION   "   Ball circles     Repetitive functional movements     BALANCE- STATIC     On floor     Airex foam     Rockerboard     BALANCE- DYNAMIC     Ambulation-head turns/nods     Ambulation - 180 & 360 degree turns     Retro ambulation EO     Ambulation with EC     Obstacles     Tandem     OKS     MSQ  SOT  FGA  DVA Y    NV (6/8) 0%   THER-EX   (CPT 33131) Y/N SETS REPS LOAD Not performed   CARDIOVASCULAR               THER ACT  (CPT 93371) Y/N  8-22 Minutes   Review pain, meds, falls, vitals, symptoms Y    *Subjective Measures   ABC     Y   (6/8) 76%   Patient Education/HEP Y (6/8) Results of physical exam, central vs peripheral signs, vestibular system anatomy and physiology, VOR. Demonstrates good understanding.        Motion Sensitivity Quotient   DATE:    6/8/22       Baseline Level (0-5):        0            MSQ Score: 0%    POSITION INTENSITY ADJUSTED  INTENSITY DURATION SCORE   Sitting to Supine 0      Supine to Left Side Lying 0      Supine to Right Side Lying 0      Supine to Sit 0      Left Hallpike       to  Sitting 0      Right Hallpike       to Sitting 0      Sitting Nose to Left Knee 0      to Sitting 0      Sitting Nose to Right Knee 0      to Sitting       Sitting Head Rotation (4X) 0      Sitting Head Flex & Ext (4X) 0      Standing  360° Turn to Right 0      Standing 360° Turn to Left 0        Intensity: Scale 0-5 (0= no symptoms and 5= severe symptoms)  Adjusted Intensity: Intensity Level - Baseline Level  Duration: Scale from 0 to 3 for return to baseline (5-10”=1, 11-30”=2, >  30”=3)  Score: Adjusted Intensity + Duration  MOTION SENSITIVITY QUOTIENT % = # Provoking Positions X Total Score   X 100                                                                                                          2048   *If < 16 positions are tested, 2048 is replaced with 8 X (# of positions tested)2              ASSESSMENT:    This 76 y.o. year old female presents to PT with above stated diagnosis. Physical Therapy  evaluation reveals impaired balance, gaze stabilization resulting in community/leisure limitations. Katheryn Goncalves will benefit from skilled PT services to address limitation, work towards rehab and patient goals and maximize PLOF of chosen ADLs.     Planned Services: The patient's treatment will include CPT 87742 Canalith repositioning procedure/maneuvers, CPT 07780 Gait training, CPT 61513 Neuromuscular Reeducation, CPT 11100 Self-care/Home management training, CPT 18013 Therapeutic activities, CPT 66824 Therapeutic exercises, CPT 28879 Therapeutic Massage, .

## 2022-06-08 NOTE — OP PT TREATMENT LOG
VESTIBULAR PT FLOWSHEET    P.T. TREATMENT LOG   Precautions:    Eval Date: 6/8/22 Progress Note:    POC expires: 8/7/22 Insurance Limits:   Treatment Current Session Time   CANALITH  REPOSITIONING TREATMENT  (CPT 59094) Y/N Notes Not performed   CRT      NEURO RE-ED  (CPT 11052) Y/N Notes 8-22 Minutes   BPPV ASSESSMENT N No subjective c/o dizziness   Vestibulo oculomotor exam  Y (6/8) See objective section   VOR CANCELLATION                      Standing H/VVOR-C     Ambulation w/ H/VVOR-C     VOR / GAZE STABILITY     Seated H/VVOR Y Retinal slip during L head turn at SSV   Standing H/VVOR NV    Ambulation w/H/VVOR     H/VVOR on compliant surfaces     Functional VOR     HABITUATION     Ball circles     Repetitive functional movements     BALANCE- STATIC     On floor     Airex foam     Rockerboard     BALANCE- DYNAMIC     Ambulation-head turns/nods     Ambulation - 180 & 360 degree turns     Retro ambulation EO     Ambulation with EC     Obstacles     Tandem     OKS     MSQ  SOT  FGA  DVA Y    NV (6/8) 0%   THER-EX   (CPT 52437) Y/N SETS REPS LOAD Not performed   CARDIOVASCULAR               THER ACT  (CPT 64867) Y/N  8-22 Minutes   Review pain, meds, falls, vitals, symptoms Y    *Subjective Measures   ABC     Y   (6/8) 76%   Patient Education/HEP Y (6/8) Results of physical exam, central vs peripheral signs, vestibular system anatomy and physiology, VOR. Demonstrates good understanding.        Motion Sensitivity Quotient   DATE:    6/8/22       Baseline Level (0-5):        0            MSQ Score: 0%    POSITION INTENSITY ADJUSTED  INTENSITY DURATION SCORE   Sitting to Supine 0      Supine to Left Side Lying 0      Supine to Right Side Lying 0      Supine to Sit 0      Left Hallpike       to  Sitting 0      Right Hallpike       to Sitting 0      Sitting Nose to Left Knee 0      to Sitting 0      Sitting Nose to Right Knee 0      to Sitting       Sitting Head Rotation (4X) 0      Sitting Head Flex & Ext (4X) 0       Standing  360° Turn to Right 0      Standing 360° Turn to Left 0        Intensity: Scale 0-5 (0= no symptoms and 5= severe symptoms)  Adjusted Intensity: Intensity Level - Baseline Level  Duration: Scale from 0 to 3 for return to baseline (5-10”=1, 11-30”=2, >  30”=3)  Score: Adjusted Intensity + Duration  MOTION SENSITIVITY QUOTIENT % = # Provoking Positions X Total Score   X 100                                                                                                          2048   *If < 16 positions are tested, 2048 is replaced with 8 X (# of positions tested)2

## 2022-06-08 NOTE — LETTER
[address]  [place] [address]  [phone]  [facility] Fax: [phone]    PHYSICAL THERAPY PLAN OF CARE    Patient Name: Katheryn Goncalves    Certification Dates:  From 06/08/22  To: 08/07/22  Frequency: 1 time/week Duration: 8 weeks  Other:      Provider: Sourav Peng PT     Referring Provider: Jaydon Childs MD  PCP: Sugar Mathews MD        Payor: Payor: MEDICARE / Plan: MEDICARE PART A & B / Product Type: Medicare /   Medical Diagnosis: No primary diagnosis found.     Rehab Potential: good, to achieve stated therapy goals    Planned Services: The patient's treatment will include CPT 21832 Canalith repositioning procedure/maneuvers, CPT 28749 Gait training, CPT 89524 Neuromuscular Reeducation, CPT 79953 Self-care/Home management training, CPT 83883 Therapeutic activities, CPT 91643 Therapeutic exercises, CPT 75795 Therapeutic Massage, .     By signing this plan of care, I certify this plan of care as correct and necessary for the patient.        Physician Signature: _________________________________________ Date: _________________    Thank you for this referral. Please contact our department with any questions.      Sourav Peng PT          Referring Provider: By co-signing this Plan of Care (POC) either electronically or physically you agree to the following:    I have reviewed the the Plan of Care established by the therapist within this document and certify that the services are skilled and medically necessary. I have reviewed the plan and recommend that these services continue to meet the goals stated in this document.       EXTERNAL PROVIDER FAXING BACK:    PHYSICIAN SIGNATURE: __________________________________     DATE: ___________________  TIME: _____________    IMPORTANT:  If returning this Plan of Care by fax, please fax back ONLY the signature page.   _________________________________________________________________________      [facility] Fax: [phone]        PT EVALUATION FOR OUTPATIENT THERAPY    Patient: Katheryn Goncalves  MRN: [id number]  : 1945 76 y.o.   Referring Physician: Jaydon Childs MD  Date of Visit: 2022      Certification Dates:  22 through 22         Recommended Frequency & Duration:  1 time/week for up to 8 weeks     Diagnosis:   1. Dizziness and giddiness    2. Labyrinthitis, unspecified ear    3. Otalgia, bilateral        Chief Complaints:   Chief Complaint   Patient presents with   • Difficulty Walking   • Balance Deficits   • Abnormality Of Gait   •  Decreased Community Integration   •  Imbalance       Precautions:      Past Medical History:   Past Medical History:   Diagnosis Date   • Marshall esophagus    • High cholesterol    • Hypothyroidism    • Osteoporosis        Past Surgical History:   Past Surgical History:   Procedure Laterality Date   • CHOLECYSTECTOMY     • FEMORAL HERNIA REPAIR Right     bowel dusky but OK - no resection   • TOTAL VAGINAL HYSTERECTOMY           LEARNING ASSESSMENT    Assessment completed:  Yes    Learner name:  Katheryn Goncalves    Learner: Patient    Learning Barriers:  Learning barriers: No Barriers    Preferred Language: English     Needed: No    Education Provided:   Method: Discussion  Readiness: acceptance  Response: Demonstrated understanding      CO-LEARNER ASSESSMENT:    Completed: No            OBJECTIVE MEASUREMENTS/DATA:    Time In Session:  Start Time: 1000  Stop Time: 1100  Time Calculation (min): 60 min   Assessment and Plan - 22 1229        Assessment    Plan of Care reviewed and patient/family in agreement Yes     System Pathology/Pathophysiology Noted vestibular     Functional Limitations in Following Categories (PT Eval) community/leisure     Rehab Potential/Prognosis good, to achieve stated therapy goals     Problem List impaired balance;gaze stabilization     Clinical Assessment The patient is a 76F who presents  for vestibular evaluation with chief complaint of imbalance during ambulation s/p URI in April. The vestibular-ocular exam was remarkable for 1st degree RBN, (+) head shaking test, and head thrust tests VOR provoked retinal slip during L head turn which indicates VOR insufficiency. Gaze evoked nystagmus was noted during R gaze, worse with visual fixation removed, indicating possible L sided vestibular hypofunction. This is confirmed with (+) head shaking test, with 3+ beats of L beating nystagmus, indicating L sided hypofunction. Positional testing for BPPV was deferred to do no complaints of dizziness. Motion sensitivity testing shows no visual motion sensitivity, scored as 0%. Balance testing will be completed at the next visit. ABC scored as 76% indicating mild perceived balance deficit, above cutoff for falls risk. The patient will benefit from physical therapy to normalize VOR and balance, increase functional tolerance, and return to previous level of function, daily routine, hobbies and work.     Plan and Recommendations NV: FGA, initiate VOR, dynamic balance, and habituation     Planned Services CPT 40605 Canalith repositioning procedure/maneuvers;CPT 47697 Gait training;CPT 32098 Neuromuscular Reeducation;CPT 48512 Self-care/Home management training;CPT 75734 Therapeutic activities;CPT 90636 Therapeutic exercises;CPT 59733 Therapeutic Massage                  Pain/Vitals - 06/08/22 1021        Pain Assessment    Currently in pain No/Denies        Pain Intervention    Intervention  NA     Post Intervention Comments NA                Falls - 06/08/22 1021        Initial Falls Assessment    One or more falls in the last year No                PT - 06/08/22 1229        Physical Therapy    Physical Therapy Vestibular        PT Plan    Frequency of treatment 1 time/week     PT Duration 8 weeks     PT Cert From 06/08/22     PT Cert To 08/07/22     Date PT POC was sent to provider 06/08/22     Signed PT Plan of Care  received?  No                   Living Environment    Living Environment - 06/08/22 1021        Living Environment    People in Home child(niki), adult     Living Arrangements apartment     Living Environment Comment Apt with son, has elevator access, walk-in shower     Transportation Concerns car, none               PLOF:    Prior Level of Function - 06/08/22 1021        OTHER    Previous level of function Patient fully independent PLOF               Sensory Tests    Sensory Testing - 06/08/22 1021        Sensory Assessment (Somatosensory)    Sensory Assessment (Somatosensory) LE sensation intact               Vestibular     PT Vestibular Evaluation - 06/08/22 1000        Imaging    Imaging studies MRI     Imaging comments WNL        Vestibular Symptoms    Symptoms Imbalance;Aural fullness;Hearing loss     Comments R sided hearing loss        Vestibular/Ocular    Corrective lenses Reading     Comments Reading and driving at night     Eye ROM WNL     Convergence Vision WNL     Divergent Vision WNL     Saccades WNL     Vestibular Ocular Reflex (VOR) WNL     Spontaneous Evoked Nystagmus WNL     Gaze-Evoked Nystagmus Abnormal     Comments 1st deg RBN     Head Thrust Test Abnormal     Comments Corrective saccade noted during L head impulse        Frenzel's Exam    Spontaneous Nystagmus WNL     Gaze Evoked Nystagmus Abnormal   1st deg RBN, more pronounced with visual fixation removed    Head Shaking Test Abnormal     Comments 3+ beats of LBN indicating L sided hypofunction        Motion Sensitivity    Motion Sensitivity Quotient Percentage (%) 0 percent     Motion Sensitivity number of provoking positions 0        Other Outcome Measures    Activities Balance Confidence  76                  Goals     • [facility] PT Vestibular Goals      Vestibular PT Goals   Long Term Goals Time Frame Result Comment/Progress   Patient will increase FGA score to >/= 28/30 for increased gait stability and balance.   6-8 weeks     Patient will  "perform home exercise program independently.   6-8 weeks     Patient will demonstrate independence with managing any residual symptoms. 6-8 weeks     Patient will be able to walk recreationally without \"drifting\".  6-8 weeks     Patient will improve score on ABC to 87% for decreased report of symptoms with daily activities. 6-8 weeks     Patient will have no increased symptoms with challenging VOR activities for symptom free high level activities.   6-8 weeks                       TREATMENT PLAN:    VESTIBULAR PT FLOWSHEET    P.T. TREATMENT LOG   Precautions:    Eval Date: 6/8/22 Progress Note:    POC expires: 8/7/22 Insurance Limits:   Treatment Current Session Time   CANALITH  REPOSITIONING TREATMENT  (CPT 37089) Y/N Notes Not performed   CRT      NEURO RE-ED  (CPT 56602) Y/N Notes 8-22 Minutes   BPPV ASSESSMENT N No subjective c/o dizziness   Vestibulo oculomotor exam  Y (6/8) See objective section   VOR CANCELLATION                      Standing H/VVOR-C     Ambulation w/ H/VVOR-C     VOR / GAZE STABILITY     Seated H/VVOR Y Retinal slip during L head turn at SSV   Standing H/VVOR NV    Ambulation w/H/VVOR     H/VVOR on compliant surfaces     Functional VOR     HABITUATION     Ball circles     Repetitive functional movements     BALANCE- STATIC     On floor     Airex foam     Rockerboard     BALANCE- DYNAMIC     Ambulation-head turns/nods     Ambulation - 180 & 360 degree turns     Retro ambulation EO     Ambulation with EC     Obstacles     Tandem     OKS     MSQ  SOT  FGA  DVA Y    NV (6/8) 0%   THER-EX   (CPT 89240) Y/N SETS REPS LOAD Not performed   CARDIOVASCULAR               THER ACT  (CPT 52151) Y/N  8-22 Minutes   Review pain, meds, falls, vitals, symptoms Y    *Subjective Measures   ABC     Y   (6/8) 76%   Patient Education/HEP Y (6/8) Results of physical exam, central vs peripheral signs, vestibular system anatomy and physiology, VOR. Demonstrates good understanding.        Motion Sensitivity Quotient "   DATE:    6/8/22       Baseline Level (0-5):        0            MSQ Score: 0%    POSITION INTENSITY ADJUSTED  INTENSITY DURATION SCORE   Sitting to Supine 0      Supine to Left Side Lying 0      Supine to Right Side Lying 0      Supine to Sit 0      Left Hallpike       to  Sitting 0      Right Hallpike       to Sitting 0      Sitting Nose to Left Knee 0      to Sitting 0      Sitting Nose to Right Knee 0      to Sitting       Sitting Head Rotation (4X) 0      Sitting Head Flex & Ext (4X) 0      Standing  360° Turn to Right 0      Standing 360° Turn to Left 0        Intensity: Scale 0-5 (0= no symptoms and 5= severe symptoms)  Adjusted Intensity: Intensity Level - Baseline Level  Duration: Scale from 0 to 3 for return to baseline (5-10”=1, 11-30”=2, >  30”=3)  Score: Adjusted Intensity + Duration  MOTION SENSITIVITY QUOTIENT % = # Provoking Positions X Total Score   X 100                                                                                                          2048   *If < 16 positions are tested, 2048 is replaced with 8 X (# of positions tested)2              ASSESSMENT:    This 76 y.o. year old female presents to PT with above stated diagnosis. Physical Therapy evaluation reveals impaired balance, gaze stabilization resulting in community/leisure limitations. Katheryn Goncalves will benefit from skilled PT services to address limitation, work towards rehab and patient goals and maximize PLOF of chosen ADLs.     Planned Services: The patient's treatment will include CPT 84701 Canalith repositioning procedure/maneuvers, CPT 56988 Gait training, CPT 52044 Neuromuscular Reeducation, CPT 39667 Self-care/Home management training, CPT 41114 Therapeutic activities, CPT 41278 Therapeutic exercises, CPT 77326 Therapeutic Massage, .

## 2022-06-13 ENCOUNTER — HOSPITAL ENCOUNTER (OUTPATIENT)
Dept: PHYSICAL THERAPY | Facility: CLINIC | Age: 77
Setting detail: THERAPIES SERIES
Discharge: HOME | End: 2022-06-13
Attending: OTOLARYNGOLOGY
Payer: MEDICARE

## 2022-06-13 DIAGNOSIS — H83.02 LABYRINTHITIS OF LEFT EAR: ICD-10-CM

## 2022-06-13 DIAGNOSIS — R42 DIZZINESS AND GIDDINESS: Primary | ICD-10-CM

## 2022-06-13 PROCEDURE — 97112 NEUROMUSCULAR REEDUCATION: CPT | Mod: GP

## 2022-06-13 PROCEDURE — 97530 THERAPEUTIC ACTIVITIES: CPT | Mod: GP

## 2022-06-13 NOTE — OP PT TREATMENT LOG
"VESTIBULAR PT FLOWSHEET    P.T. TREATMENT LOG   Precautions:    Eval Date: 6/8/22 Progress Note:    POC expires: 8/7/22 Insurance Limits:   Treatment Current Session Time   CANALITH  REPOSITIONING TREATMENT  (CPT 93519) Y/N Notes Not performed   CRT      NEURO RE-ED  (CPT 74737) Y/N Notes 23-37 Minutes   BPPV ASSESSMENT N No subjective c/o dizziness   Vestibulo oculomotor exam   (6/8) See objective section   VOR CANCELLATION                      Standing H/VVOR-C     Ambulation w/ H/VVOR-C     VOR / GAZE STABILITY     Seated H/VVOR  Retinal slip during L head turn at SSV   Standing H/VVOR Y Standing FA 5' from 1\" B on plain background  HVORx1: 80, 85 bpm x60\", intermittent retinal slip, no dizziness or blurring  VVORx1: 130 bpm x60\", no retinal sleip   Ambulation w/H/VVOR     H/VVOR on compliant surfaces     Functional VOR     HABITUATION     Ball circles     Repetitive functional movements     BALANCE- STATIC     On floor Y 1/2 tandem, EC, 2x30 sec ea   Airex foam     Rockerboard     BALANCE- DYNAMIC     Ambulation-head turns/nods     Ambulation - 180 & 360 degree turns     Retro ambulation EO     Ambulation with EC     Obstacles     Tandem     OKS     MSQ  SOT  FGA  DVA     Y (6/8) 0%    (6/13) 20/30   THER-EX   (CPT 64449) Y/N SETS REPS LOAD Not performed   CARDIOVASCULAR               THER ACT  (CPT 24453) Y/N  23-37 Minutes   Review pain, meds, falls, vitals, symptoms Y    *Subjective Measures   ABC        (6/8) 76%   Patient Education/HEP Y (6/8) Results of physical exam, central vs peripheral signs, vestibular system anatomy and physiology, VOR. Demonstrates good understanding.     (6/13) Initiated HEP with VORx1, handout provided and reviewed.        Motion Sensitivity Quotient   DATE:    6/8/22       Baseline Level (0-5):        0            MSQ Score: 0%    POSITION INTENSITY ADJUSTED  INTENSITY DURATION SCORE   Sitting to Supine 0      Supine to Left Side Lying 0      Supine to Right Side Lying 0    "   Supine to Sit 0      Left Hallpike       to  Sitting 0      Right Hallpike       to Sitting 0      Sitting Nose to Left Knee 0      to Sitting 0      Sitting Nose to Right Knee 0      to Sitting       Sitting Head Rotation (4X) 0      Sitting Head Flex & Ext (4X) 0      Standing  360° Turn to Right 0      Standing 360° Turn to Left 0        Intensity: Scale 0-5 (0= no symptoms and 5= severe symptoms)  Adjusted Intensity: Intensity Level - Baseline Level  Duration: Scale from 0 to 3 for return to baseline (5-10”=1, 11-30”=2, >  30”=3)  Score: Adjusted Intensity + Duration  MOTION SENSITIVITY QUOTIENT % = # Provoking Positions X Total Score   X 100                                                                                                          2048   *If < 16 positions are tested, 2048 is replaced with 8 X (# of positions tested)2

## 2022-06-13 NOTE — PROGRESS NOTES
PT DAILY NOTE FOR OUTPATIENT THERAPY    Patient: Katheryn Goncalves MRN: [id number]  : 1945 76 y.o.  Referring Physician: Jaydon Childs MD  Date of Visit: 2022    Certification Dates: 22 through 22    Diagnosis:   1. Dizziness and giddiness    2. Labyrinthitis of left ear        Chief Complaints:  Imbalance    Precautions:         TODAY'S VISIT    Time In Session:  Start Time: 1700  Stop Time: 1800  Time Calculation (min): 60 min   History/Vitals/Pain/Encounter Info - 22 1658        Injury History/Precautions/Daily Required Info    Document Type daily treatment     Chief Complaint/Reason for Visit  Imbalance     Referring Physician Dr. Jaydon Childs     History of present illness/functional impairment Patient presents with intermittent imbalance during functional activit. April of last year had ENT evaluation with Dr. Cole following onset of vertigo s/p COVID vaccine, and was noted to have decreased hearing and will be getting hearing aids. Was felt that she had labyrinthitis which resolved within a month. Patient noted onset of imabalnce within the last 2 months after having an upper respiratory infection which she thinks she acquired from her grandchildren. Was COVID-negative. Returned to see Dr. Cole, who given the recurrent nature of her imbalance, wants her to go to vestibular therapy.  MRI within normal limits within the past year. Patient denies dizziness or vertigo since onset of imbalance. Notes veering laterally when walking with friends for recreation. Presents to OPPT with goals of normalizing balance to optimize function and safety,     OP Specialty Vestibular     Patient/Family/Caregiver Comments/Observations Patient presents with no dizziness. No falls or changes to meds since last visit.     Patient reported fall since last visit No        Pain Assessment    Currently in pain No/Denies        Pain Intervention    Intervention  NA     Post Intervention Comments  "NA                Daily Treatment Assessment and Plan - 06/13/22 1705        Daily Treatment Assessment and Plan    Progress toward goals Progressing     Daily Outcome Summary Patient scores 20/30 on FGA, below cutoff for falls risk, indicating impaired use of sensory integration, likely stemming from bilateral vestibular impairment. Initiated VORx1 during today's session. Patient demos bilateral, intermittent retinal slip during HVORx1, performs up to 85 bpm. No retinal slip noted during VVORx1, performs up to 130 bpm. VORx1 is provided on handout for HEP. Reviewed with patient who demonstrates good understanding. Katheryn leaves session with no c/o dizziness or increased imbalance, tolerated all interventions well.     Plan and Recommendations Cont. VORx1, static balance. Add use of airex/rocker for static. Initiate dynamic balance                     OBJECTIVE DATA TAKEN TODAY:    Vestibular     PT Vestibular Evaluation - 06/13/22 1900        Balance    FGA Score (out of 30 total) 20                 Today's Treatment:    VESTIBULAR PT FLOWSHEET    P.T. TREATMENT LOG   Precautions:    Eval Date: 6/8/22 Progress Note:    POC expires: 8/7/22 Insurance Limits:   Treatment Current Session Time   CANALITH  REPOSITIONING TREATMENT  (CPT 43156) Y/N Notes Not performed   CRT      NEURO RE-ED  (CPT 60568) Y/N Notes 23-37 Minutes   BPPV ASSESSMENT N No subjective c/o dizziness   Vestibulo oculomotor exam   (6/8) See objective section   VOR CANCELLATION                      Standing H/VVOR-C     Ambulation w/ H/VVOR-C     VOR / GAZE STABILITY     Seated H/VVOR  Retinal slip during L head turn at SSV   Standing H/VVOR Y Standing FA 5' from 1\" B on plain background  HVORx1: 80, 85 bpm x60\", intermittent retinal slip, no dizziness or blurring  VVORx1: 130 bpm x60\", no retinal sleip   Ambulation w/H/VVOR     H/VVOR on compliant surfaces     Functional VOR     HABITUATION     Ball circles     Repetitive functional movements   "   BALANCE- STATIC     On floor Y 1/2 tandem, EC, 2x30 sec ea   Airex foam     Rockerboard     BALANCE- DYNAMIC     Ambulation-head turns/nods     Ambulation - 180 & 360 degree turns     Retro ambulation EO     Ambulation with EC     Obstacles     Tandem     OKS     MSQ  SOT  FGA  DVA     Y (6/8) 0%    (6/13) 20/30   THER-EX   (CPT 43732) Y/N SETS REPS LOAD Not performed   CARDIOVASCULAR               THER ACT  (CPT 96254) Y/N  23-37 Minutes   Review pain, meds, falls, vitals, symptoms Y    *Subjective Measures   ABC        (6/8) 76%   Patient Education/HEP Y (6/8) Results of physical exam, central vs peripheral signs, vestibular system anatomy and physiology, VOR. Demonstrates good understanding.     (6/13) Initiated HEP with VORx1, handout provided and reviewed.        Motion Sensitivity Quotient   DATE:    6/8/22       Baseline Level (0-5):        0            MSQ Score: 0%    POSITION INTENSITY ADJUSTED  INTENSITY DURATION SCORE   Sitting to Supine 0      Supine to Left Side Lying 0      Supine to Right Side Lying 0      Supine to Sit 0      Left Hallpike       to  Sitting 0      Right Hallpike       to Sitting 0      Sitting Nose to Left Knee 0      to Sitting 0      Sitting Nose to Right Knee 0      to Sitting       Sitting Head Rotation (4X) 0      Sitting Head Flex & Ext (4X) 0      Standing  360° Turn to Right 0      Standing 360° Turn to Left 0        Intensity: Scale 0-5 (0= no symptoms and 5= severe symptoms)  Adjusted Intensity: Intensity Level - Baseline Level  Duration: Scale from 0 to 3 for return to baseline (5-10”=1, 11-30”=2, >  30”=3)  Score: Adjusted Intensity + Duration  MOTION SENSITIVITY QUOTIENT % = # Provoking Positions X Total Score   X 100                                                                                                          2048   *If < 16 positions are tested, 2048 is replaced with 8 X (# of positions tested)2

## 2022-06-14 ENCOUNTER — TELEPHONE (OUTPATIENT)
Dept: INTERNAL MEDICINE | Facility: CLINIC | Age: 77
End: 2022-06-14
Payer: MEDICARE

## 2022-06-15 NOTE — TELEPHONE ENCOUNTER
Spoke with patient about her blood/urine test results. All tests came back normal.  I advised her to continue to take Vitamin D as current.

## 2022-06-20 ENCOUNTER — HOSPITAL ENCOUNTER (OUTPATIENT)
Dept: PHYSICAL THERAPY | Facility: CLINIC | Age: 77
Setting detail: THERAPIES SERIES
Discharge: HOME | End: 2022-06-20
Attending: OTOLARYNGOLOGY
Payer: MEDICARE

## 2022-06-20 DIAGNOSIS — R42 DIZZINESS AND GIDDINESS: Primary | ICD-10-CM

## 2022-06-20 PROCEDURE — 97530 THERAPEUTIC ACTIVITIES: CPT | Mod: GP

## 2022-06-20 PROCEDURE — 97112 NEUROMUSCULAR REEDUCATION: CPT | Mod: GP

## 2022-06-20 NOTE — PROGRESS NOTES
"Patient: Katheryn Goncalves   MRN: [id number]  : 1945 76 y.o.    Referring Physician: Jaydon Childs MD  Date of Visit: 2022    Diagnosis:   1. Dizziness and giddiness          SUBJECTIVE: Pt reports to session today without dizziness. \"I never really had dizziness, it was more the balance issue that I had.\" Pt denies recent falls since previous session, \"But I do feel like my balance is still off.\"      OBJECTIVE    Pre Pain:  No pain, dizziness  Post Pain: No pain    Start Time: 1704  Stop Time: 1757  Time Calculation (min): 53 min  Injury History/Precautions/Daily Required Info  Document Type: daily treatment  Primary Therapist: Rodger Hardy PT  Chief Complaint/Reason for Visit : Imbalance  Referring Physician: Jaydon Childs MD  Existing Precautions/Restrictions: no known precautions/restrictions  History of present illness/functional impairment: Patient presents with intermittent imbalance during functional activit. April of last year had ENT evaluation with Dr. Cole following onset of vertigo s/p COVID vaccine, and was noted to have decreased hearing and will be getting hearing aids. Was felt that she had labyrinthitis which resolved within a month. Patient noted onset of imabalnce within the last 2 months after having an upper respiratory infection which she thinks she acquired from her grandchildren. Was COVID-negative. Returned to see Dr. Cole, who given the recurrent nature of her imbalance, wants her to go to vestibular therapy.  MRI within normal limits within the past year. Patient denies dizziness or vertigo since onset of imbalance. Notes veering laterally when walking with friends for recreation. Presents to OPPT with goals of normalizing balance to optimize function and safety,  OP Specialty: Vestibular  Patient reported fall since last visit: No    Objective Measurements/Data Taken Today:  None taken    VESTIBULAR PT FLOWSHEET    P.T. TREATMENT LOG   Precautions:    Eval " "Date: 6/8/22 Progress Note:    POC expires: 8/7/22 Insurance Limits:   Treatment Current Session Time   CANALITH  REPOSITIONING TREATMENT  (CPT 99192) Y/N Notes Not performed   CRT      NEURO RE-ED  (CPT 73046) Y/N Notes 38-52 Minutes   BPPV ASSESSMENT N No subjective c/o dizziness   Vestibulo oculomotor exam   (6/8) See objective section   VOR CANCELLATION                      Standing H/VVOR-C     Ambulation w/ H/VVOR-C     VOR / GAZE STABILITY     Seated H/VVOR  Retinal slip during L head turn at SSV   Standing H/VVOR Y Standing FA 5' from 1\" B on plain background  HVORx1: 90/95 bpm x60\", intermittent retinal slip, no dizziness or blurring  VVORx1: 130 bpm x60\", no retinal slip  *pt educated to complete 100 bpm for HVOR; pt verbalizes understanding    Ambulation w/H/VVOR     H/VVOR on compliant surfaces     Functional VOR     HABITUATION     Ball circles     Repetitive functional movements     BALANCE- STATIC     On floor Y 1/2 tandem, EC, 2x30 sec ea   Airex foam     Rockerboard     BALANCE- DYNAMIC     Ambulation-head turns/nods Yes  Walking with head turns 2x80 ft  Walking with head nods 2x80 ft  Walking backwards 2x80 ft    Ambulation - 180 & 360 degree turns     Retro ambulation EO     Ambulation with EC     Obstacles     Tandem     OKS     MSQ  SOT  FGA  DVA     No  (6/8) 0%    (6/13) 20/30   THER-EX   (CPT 21560) Y/N SETS REPS LOAD Not performed   CARDIOVASCULAR               THER ACT  (CPT 04544) Y/N  8-22 Minutes    Review pain, meds, falls, vitals, symptoms Y    *Subjective Measures   ABC        (6/8) 76%   Patient Education/HEP Y (6/8) Results of physical exam, central vs peripheral signs, vestibular system anatomy and physiology, VOR. Demonstrates good understanding.     (6/13) Initiated HEP with VORx1, handout provided and reviewed.     (6/21) - Pt educated on basic vestibular anatomy and function. Visual model used for reference. Pt verbalizes understanding.          ASSESSMENT: Pt was able to " tolerate the entire session today. She began with standing VOR exercises, progressing from 85 bpm to 95 bpm for HVOR without vestibular symptoms. Pt educated on self progression for HEP, educated to complete 100 bpm for HVOR; pt verbalizes understanding. Afterwards basic vestibular anatomy discussed, pt educated on the functional impact of vestibular dysfunction; pt states that she understands. Afterwards dynamic balance/habituation exercises completed for the duration of the session. Please consider ball circles and figure 4 exercises during next session. Pt denies dizziness/pain post session. Pt would continue to benefit from skilled OP PT intervention in order to increase functional mobility and return to prior lifestyle.      PLAN: progress VOR and habituation exercises as appropriate

## 2022-06-27 ENCOUNTER — HOSPITAL ENCOUNTER (OUTPATIENT)
Dept: PHYSICAL THERAPY | Facility: CLINIC | Age: 77
Setting detail: THERAPIES SERIES
Discharge: HOME | End: 2022-06-27
Attending: OTOLARYNGOLOGY
Payer: MEDICARE

## 2022-06-27 DIAGNOSIS — H83.02 LABYRINTHITIS OF LEFT EAR: ICD-10-CM

## 2022-06-27 DIAGNOSIS — R42 DIZZINESS AND GIDDINESS: Primary | ICD-10-CM

## 2022-06-27 PROCEDURE — 97112 NEUROMUSCULAR REEDUCATION: CPT | Mod: GP | Performed by: PHYSICAL THERAPIST

## 2022-06-27 NOTE — OP PT TREATMENT LOG
"VESTIBULAR PT FLOWSHEET    P.T. TREATMENT LOG   Precautions:    Eval Date: 6/8/22 Progress Note:    POC expires: 8/7/22 Insurance Limits:   Treatment Current Session Time   CANALITH  REPOSITIONING TREATMENT  (CPT 82736) Y/N Notes Not performed   CRT      NEURO RE-ED  (CPT 00613) Y/N Notes 53-67 Minutes   BPPV ASSESSMENT N No subjective c/o dizziness   Vestibulo oculomotor exam   (6/8) See objective section   VOR CANCELLATION                      Standing H/VVOR-C y HVOR SSV 30 sec x 2  VVOR SSV 30 sec x 2 looking up pt has retinal slip   Ambulation w/ H/VVOR-C     VOR / GAZE STABILITY     Seated H/VVOR  Retinal slip during L head turn at SSV   Standing H/VVOR Y            Y Standing FA 5' from 1\" B on plain background  HVORx1: 120 bpm x60\", no dizziness or blurring, mild balance  VVORx1: 120   bpm x60\", no retinal sleip, no balance deficit    SSV Standing on airex FA central and peripheral distraction with yellow wands  HVOR: 30 sec x 2 no difficulty   VVOR 30 sec x 2 no difficulty   Ambulation w/H/VVOR y HVOR: Central and peripheral distraction mild balance deficits  VVOR: Cental and peripheral distraction with wands mild balance deficits   H/VVOR on compliant surfaces     Functional VOR     HABITUATION     Ball circles     Repetitive functional movements     BALANCE- STATIC     On floor  1/2 tandem, EC, 2x30 sec ea   Airex foam y 1/2 tandem EC, HT, NT sec ea   Rockerboard     BALANCE- DYNAMIC     Ambulation-head turns/nods y B hand held   Ambulation - 180 & 360 degree turns y With cues for turns   Retro ambulation EO     Ambulation with EC     Obstacles     Single leg marches y    Tandem y 30 ft x 2   OKS     MSQ  SOT  FGA  DVA      (6/8) 0%    (6/13) 20/30   THER-EX   (CPT 47619) Y/N SETS REPS LOAD Not performed   CARDIOVASCULAR               THER ACT  (CPT 20382) Y/N  Not performed   Review pain, meds, falls, vitals, symptoms Y    *Subjective Measures   ABC        (6/8) 76%   Patient Education/HEP  (6/8) Results " of physical exam, central vs peripheral signs, vestibular system anatomy and physiology, VOR. Demonstrates good understanding.     (6/13) Initiated HEP with VORx1, handout provided and reviewed.        Motion Sensitivity Quotient   DATE:    6/8/22       Baseline Level (0-5):        0            MSQ Score: 0%    POSITION INTENSITY ADJUSTED  INTENSITY DURATION SCORE   Sitting to Supine 0      Supine to Left Side Lying 0      Supine to Right Side Lying 0      Supine to Sit 0      Left Hallpike       to  Sitting 0      Right Hallpike       to Sitting 0      Sitting Nose to Left Knee 0      to Sitting 0      Sitting Nose to Right Knee 0      to Sitting       Sitting Head Rotation (4X) 0      Sitting Head Flex & Ext (4X) 0      Standing  360° Turn to Right 0      Standing 360° Turn to Left 0        Intensity: Scale 0-5 (0= no symptoms and 5= severe symptoms)  Adjusted Intensity: Intensity Level - Baseline Level  Duration: Scale from 0 to 3 for return to baseline (5-10”=1, 11-30”=2, >  30”=3)  Score: Adjusted Intensity + Duration  MOTION SENSITIVITY QUOTIENT % = # Provoking Positions X Total Score   X 100                                                                                                          2048   *If < 16 positions are tested, 2048 is replaced with 8 X (# of positions tested)2

## 2022-06-27 NOTE — PROGRESS NOTES
PT DAILY NOTE FOR OUTPATIENT THERAPY    Patient: Katheryn Goncalves MRN: [id number]  : 1945 76 y.o.  Referring Physician: Jaydon Childs MD  Date of Visit: 2022    Certification Dates: 22 through 22    Diagnosis:   1. Dizziness and giddiness    2. Labyrinthitis of left ear        Chief Complaints:  Imbalance    Precautions:   Existing Precautions/Restrictions: no known precautions/restrictions     TODAY'S VISIT    Time In Session:  Start Time: 1605  Stop Time: 1700  Time Calculation (min): 55 min   History/Vitals/Pain/Encounter Info - 22 1605        Injury History/Precautions/Daily Required Info    Document Type daily treatment     Primary Therapist Rodger Hardy, [place]     Chief Complaint/Reason for Visit  Imbalance     Referring Physician Jaydon Childs MD     Existing Precautions/Restrictions no known precautions/restrictions     History of present illness/functional impairment Patient presents with intermittent imbalance during functional activit. April of last year had ENT evaluation with Dr. Cole following onset of vertigo s/p COVID vaccine, and was noted to have decreased hearing and will be getting hearing aids. Was felt that she had labyrinthitis which resolved within a month. Patient noted onset of imabalnce within the last 2 months after having an upper respiratory infection which she thinks she acquired from her grandchildren. Was COVID-negative. Returned to see Dr. Cole, who given the recurrent nature of her imbalance, wants her to go to vestibular therapy.  MRI within normal limits within the past year. Patient denies dizziness or vertigo since onset of imbalance. Notes veering laterally when walking with friends for recreation. Presents to OPPT with goals of normalizing balance to optimize function and safety,     OP Specialty Vestibular     Patient/Family/Caregiver Comments/Observations Pt states no changes     Patient reported fall since last visit No         "Pain Assessment    Currently in pain No/Denies        Pain Intervention    Intervention  NA     Post Intervention Comments NA        Pre Activity Vital Signs    /58     BP Location Left upper arm     BP Method Manual     Patient Position Sitting   given water               Daily Treatment Assessment and Plan - 06/27/22 1605        Daily Treatment Assessment and Plan    Progress toward goals Progressing     Daily Outcome Summary Added VOR-C today. Increased difficulty with vertical VOR/Vor-C. No symptoms of dizziness with increased speed and peripheral distraction. Pt has balance deficits with HIGHER level dynamic balance exercises. Continue to progress dynamic balance deficits next session.     Plan and Recommendations Cont. VORx1, static balance. Add use of airex/rocker for static. Initiate dynamic balance                     OBJECTIVE DATA TAKEN TODAY:    None taken    Today's Treatment:    VESTIBULAR PT FLOWSHEET    P.T. TREATMENT LOG   Precautions:    Eval Date: 6/8/22 Progress Note:    POC expires: 8/7/22 Insurance Limits:   Treatment Current Session Time   CANALITH  REPOSITIONING TREATMENT  (CPT 75164) Y/N Notes Not performed   CRT      NEURO RE-ED  (CPT 64035) Y/N Notes 53-67 Minutes   BPPV ASSESSMENT N No subjective c/o dizziness   Vestibulo oculomotor exam   (6/8) See objective section   VOR CANCELLATION                      Standing H/VVOR-C y HVOR SSV 30 sec x 2  VVOR SSV 30 sec x 2 looking up pt has retinal slip   Ambulation w/ H/VVOR-C     VOR / GAZE STABILITY     Seated H/VVOR  Retinal slip during L head turn at SSV   Standing H/VVOR Y            Y Standing FA 5' from 1\" B on plain background  HVORx1: 120 bpm x60\", no dizziness or blurring, mild balance  VVORx1: 120   bpm x60\", no retinal sleip, no balance deficit    SSV Standing on airex FA central and peripheral distraction with yellow wands  HVOR: 30 sec x 2 no difficulty   VVOR 30 sec x 2 no difficulty   Ambulation w/H/VVOR y HVOR: Central " and peripheral distraction mild balance deficits  VVOR: Cental and peripheral distraction with wands mild balance deficits   H/VVOR on compliant surfaces     Functional VOR     HABITUATION     Ball circles     Repetitive functional movements     BALANCE- STATIC     On floor  1/2 tandem, EC, 2x30 sec ea   Airex foam y 1/2 tandem EC, HT, NT sec ea   Rockerboard     BALANCE- DYNAMIC     Ambulation-head turns/nods y B hand held   Ambulation - 180 & 360 degree turns y With cues for turns   Retro ambulation EO     Ambulation with EC     Obstacles     Single leg marches y    Tandem y 30 ft x 2   OKS     MSQ  SOT  FGA  DVA      (6/8) 0%    (6/13) 20/30   THER-EX   (CPT 04133) Y/N SETS REPS LOAD Not performed   CARDIOVASCULAR               THER ACT  (CPT 45603) Y/N  Not performed   Review pain, meds, falls, vitals, symptoms Y    *Subjective Measures   ABC        (6/8) 76%   Patient Education/HEP  (6/8) Results of physical exam, central vs peripheral signs, vestibular system anatomy and physiology, VOR. Demonstrates good understanding.     (6/13) Initiated HEP with VORx1, handout provided and reviewed.        Motion Sensitivity Quotient   DATE:    6/8/22       Baseline Level (0-5):        0            MSQ Score: 0%    POSITION INTENSITY ADJUSTED  INTENSITY DURATION SCORE   Sitting to Supine 0      Supine to Left Side Lying 0      Supine to Right Side Lying 0      Supine to Sit 0      Left Hallpike       to  Sitting 0      Right Hallpike       to Sitting 0      Sitting Nose to Left Knee 0      to Sitting 0      Sitting Nose to Right Knee 0      to Sitting       Sitting Head Rotation (4X) 0      Sitting Head Flex & Ext (4X) 0      Standing  360° Turn to Right 0      Standing 360° Turn to Left 0        Intensity: Scale 0-5 (0= no symptoms and 5= severe symptoms)  Adjusted Intensity: Intensity Level - Baseline Level  Duration: Scale from 0 to 3 for return to baseline (5-10”=1, 11-30”=2, >  30”=3)  Score: Adjusted Intensity +  Duration  MOTION SENSITIVITY QUOTIENT % = # Provoking Positions X Total Score   X 100                                                                                                          2048   *If < 16 positions are tested, 2048 is replaced with 8 X (# of positions tested)2

## 2022-07-11 ENCOUNTER — HOSPITAL ENCOUNTER (OUTPATIENT)
Dept: PHYSICAL THERAPY | Facility: CLINIC | Age: 77
Setting detail: THERAPIES SERIES
Discharge: HOME | End: 2022-07-11
Attending: OTOLARYNGOLOGY
Payer: MEDICARE

## 2022-07-11 DIAGNOSIS — R42 DIZZINESS AND GIDDINESS: Primary | ICD-10-CM

## 2022-07-11 DIAGNOSIS — H83.02 LABYRINTHITIS OF LEFT EAR: ICD-10-CM

## 2022-07-11 PROCEDURE — 97112 NEUROMUSCULAR REEDUCATION: CPT | Mod: GP

## 2022-07-22 ENCOUNTER — HOSPITAL ENCOUNTER (OUTPATIENT)
Dept: PHYSICAL THERAPY | Facility: CLINIC | Age: 77
Setting detail: THERAPIES SERIES
Discharge: HOME | End: 2022-07-22
Attending: OTOLARYNGOLOGY
Payer: MEDICARE

## 2022-07-22 DIAGNOSIS — H83.02 LABYRINTHITIS OF LEFT EAR: ICD-10-CM

## 2022-07-22 DIAGNOSIS — R42 DIZZINESS AND GIDDINESS: Primary | ICD-10-CM

## 2022-07-22 DIAGNOSIS — H92.03 OTALGIA, BILATERAL: ICD-10-CM

## 2022-07-22 PROCEDURE — 97112 NEUROMUSCULAR REEDUCATION: CPT | Mod: GP

## 2022-07-22 NOTE — PROGRESS NOTES
Patient: Katheryn Goncalves   MRN: [id number]  : 1945 76 y.o.    Referring Physician: Jaydon Childs MD  Date of Visit: 2022    Diagnosis:   1. Dizziness and giddiness    2. Labyrinthitis of left ear    3. Otalgia, bilateral          SUBJECTIVE: Pt arrives to PT today reporting no current pain or dizziness. Feels balance is primary impairment.       OBJECTIVE    Pre Pain:  No pain  Post Pain: No pain    VITALS PRE SESSION DURING SESSION POST SESSION   BP      HR        Start Time: 1100  Stop Time: 1155  Time Calculation (min): 55 min  Injury History/Precautions/Daily Required Info  Document Type: daily treatment  Primary Therapist: Rodger Hardy PT  Chief Complaint/Reason for Visit : Imbalance  Referring Physician: Jaydon Childs MD  Existing Precautions/Restrictions: no known precautions/restrictions  History of present illness/functional impairment: Patient presents with intermittent imbalance during functional activit. April of last year had ENT evaluation with Dr. Cole following onset of vertigo s/p COVID vaccine, and was noted to have decreased hearing and will be getting hearing aids. Was felt that she had labyrinthitis which resolved within a month. Patient noted onset of imabalnce within the last 2 months after having an upper respiratory infection which she thinks she acquired from her grandchildren. Was COVID-negative. Returned to see Dr. Cole, who given the recurrent nature of her imbalance, wants her to go to vestibular therapy.  MRI within normal limits within the past year. Patient denies dizziness or vertigo since onset of imbalance. Notes veering laterally when walking with friends for recreation. Presents to OPPT with goals of normalizing balance to optimize function and safety,  Patient reported fall since last visit: No    Objective Measurements/Data Taken Today:  None taken     P.T. TREATMENT LOG   Precautions:    Eval Date: 22 Progress Note:    POC expires: 22  "Insurance Limits:   Treatment Current Session Time   CANALITH  REPOSITIONING TREATMENT  (CPT 74479) Y/N Notes Not performed   CRT         NEURO RE-ED  (CPT 08415) Y/N Notes 53-67 Minutes   BPPV ASSESSMENT N No subjective c/o dizziness   Vestibulo oculomotor exam   (6/8) See objective section   VOR CANCELLATION                       Standing H/VVOR-C no HVOR SSV 30 sec x 2  VVOR SSV 30 sec x 2 looking up pt has retinal slip   Ambulation w/ H/VVOR-C       VOR / GAZE STABILITY       Seated H/VVOR   Retinal slip during L head turn at SSV   Standing H/VVOR no                    no                    no Standing FA 5' from 1\" B on plain background  HVORx1: 120 bpm x60\", no dizziness or blurring, mild balance  VVORx1: 120 bpm x60\", no retinal sleip, no balance deficit  *pt reports 140 bpm for HEP; pt terminates      Standing, semi tandem, 5' from 1\" B on plain background  HVORx1: 105 bpm x60\", increase in lateral sway, no dizziness  VVORx1: 105 bpm x60\",  increase in lateral sway, no dizziness     SSV Standing on airex FA central and peripheral distraction with yellow wands  HVOR: 30 sec x 2 no difficulty   VVOR 30 sec x 2 no difficulty   Ambulation w/H/VVOR no HVOR: Central and peripheral distraction mild balance deficits  VVOR: Cental and peripheral distraction with wands mild balance deficits   H/VVOR on compliant surfaces       Functional VOR       HABITUATION       Ball circles       Repetitive functional movements       BALANCE- STATIC       On floor Y Tandem 30\"x2  Semi-tandem with head turns x10  Semi- tandem with head nods x10  Tandem 4x15\" EO   Airex foam Y NBOS with head turns x10  NBOS with head nods x10  FT with EC 30\"x2  Tandem on 2x30\"  Marching in place-2x10 no UE support   Rockerboard Y AP weight shifting x10- no UE support  ML weight shifting x10-no UE support  AP center balance 2x30\"- light UUE support  ML center balance 2x30\"- light UUE support   BALANCE- DYNAMIC       Ambulation-head turns/nods Y " Walking with head turns - 2x80 ft  Walking with head nods - 2x80 ft  Walking backwards - 2x80 ft    Ambulation - 180 & 360 degree turns Y Walking with 180* turns - 2x80 ft   Retro ambulation EO       Ambulation with EC       Obstacles       Bosu Step up NV    Single leg marches       Tandem walking Y 3 laps in // bars no UE support   OKS      MSQ  SOT  FGA  DVA         (6/8) 0%     (6/13) 20/30   THER-EX   (CPT 55802) Y/N SETS REPS LOAD Not performed   CARDIOVASCULAR                       THER ACT  (CPT 47445) Y/N   Not performed   Review pain, meds, falls, vitals, symptoms      *Subjective Measures   ABC            (6/8) 76%   Patient Education/HEP   (6/8) Results of physical exam, central vs peripheral signs, vestibular system anatomy and physiology, VOR. Demonstrates good understanding.      (6/13) Initiated HEP with VORx1, handout provided and reviewed.      ASSESSMENT: Focused entire session on balance today. Added wobble board and progressed foam surface exercises as able. Pt challenged with reactive balance on wobble board. Mild unsteadiness with ambulation with head turns as well as ambulation with 360 degree turns. She will continue to benefit from skilled PT to further improve dynamic balance and allow for decreased risk of falls and return to PLOF without restriction.        PLAN: Progress note NV. Consider addition of bosu step ups.

## 2022-07-25 ENCOUNTER — HOSPITAL ENCOUNTER (OUTPATIENT)
Dept: PHYSICAL THERAPY | Facility: CLINIC | Age: 77
Setting detail: THERAPIES SERIES
Discharge: HOME | End: 2022-07-25
Attending: OTOLARYNGOLOGY
Payer: MEDICARE

## 2022-07-25 DIAGNOSIS — R42 DIZZINESS AND GIDDINESS: Primary | ICD-10-CM

## 2022-07-25 PROCEDURE — 97530 THERAPEUTIC ACTIVITIES: CPT | Mod: GP

## 2022-07-25 PROCEDURE — 97112 NEUROMUSCULAR REEDUCATION: CPT | Mod: GP

## 2022-07-25 NOTE — PROGRESS NOTES
"  PT PROGRESS NOTE FOR OUTPATIENT THERAPY    Patient: Katheryn Goncalves MRN: [id number]  : 1945 76 y.o.  Referring Physician: Jaydon Childs MD  Date of Visit: 2022      Certification Dates: 22 through 22    Recommended Frequency & Duration:  1 time/week for up to 8 weeks          Diagnosis:   1. Dizziness and giddiness        Chief Complaints:  No chief complaint on file.      Precautions:   Existing Precautions/Restrictions: no known precautions/restrictions     TODAY'S VISIT:    Time In Session:  Start Time: 1101  Stop Time: 1156  Time Calculation (min): 55 min   General Information - 22 1107        Session Details    Document Type progress note     Mode of Treatment physical therapy     Patient/Family/Caregiver Comments/Observations Pt reports to session today without dizziness or vertigo. Pt reports that she, \"Did not really think about my balance over the weekend. Maybe that is a good thing?\"        General Information    Referring Physician Jaydon Childs MD     History of present illness/functional impairment Patient presents with intermittent imbalance during functional activit. April of last year had ENT evaluation with Dr. Cole following onset of vertigo s/p COVID vaccine, and was noted to have decreased hearing and will be getting hearing aids. Was felt that she had labyrinthitis which resolved within a month. Patient noted onset of imabalnce within the last 2 months after having an upper respiratory infection which she thinks she acquired from her grandchildren. Was COVID-negative. Returned to see Dr. Cole, who given the recurrent nature of her imbalance, wants her to go to vestibular therapy.  MRI within normal limits within the past year. Patient denies dizziness or vertigo since onset of imbalance. Notes veering laterally when walking with friends for recreation. Presents to OPPT with goals of normalizing balance to optimize function and safety,     Existing " "Precautions/Restrictions no known precautions/restrictions                Daily Falls Screen - 07/25/22 1107        Daily Falls Assessment    Patient reported fall since last visit No                Pain/Vitals - 07/25/22 1107        Pain Assessment    Currently in pain No/Denies        Pain Intervention    Intervention  none     Post Intervention Comments none                PT - 07/25/22 1107        Physical Therapy    Physical Therapy Specialty Vestibular Rehab        PT Plan    Frequency of treatment 1 time/week     PT Duration 8 weeks     PT Cert From 06/08/22     PT Cert To 08/07/22     Date PT POC was sent to provider 06/08/22     Signed PT Plan of Care received?  Yes                Assessment and Plan - 07/25/22 1107        Assessment    Plan of Care reviewed and patient/family in agreement Yes     System Pathology/Pathophysiology Noted vestibular     Functional Limitations in Following Categories (PT Eval) community/leisure     Rehab Potential/Prognosis good, to achieve stated therapy goals     Problem List impaired balance;gaze stabilization     Clinical Assessment Progress note administered today as 30 days have occurred since IE. Goals are assessed, new ABC goals is completed at 70%. FGA increases to 21/30, no significant change noted. Pt subjectively reports feeling more confident in her balance after stating, \"I actually did not think about my balance much over the weekend.\" After goals are all assessed, pt continues to NMRE without an overt loss of balance. PT educates pt on the 3 components to balance and how each one is affected. Pt states that she understands, however further education may be warranted. Pt denies dizziness/pain post session. Pt would continue to benefit from skilled OP PT intervention in order to increase functional mobility and return to prior lifestyle.     Planned Services CPT 38025 Canalith repositioning procedure/maneuvers;CPT 66896 Gait training;CPT 47218 Neuromuscular " "Reeducation;CPT 81312 Therapeutic activities;CPT 32289 Therapeutic exercises;CPT 27202 Therapeutic Massage;CPT 32640 Self-care/Home management training                 OBJECTIVE MEASUREMENTS/DATA:    Outcome Measures    PT Outcome Measures - 07/25/22 1107        Objective Outcome Measures    FGA Score (out of 30 total) 21        Subjective Outcome Measures    ABC 70%                   Outcome Measures    PT OBJECTIVE Outcome Measures 6/13/22 7/25/22   FGA 20 21      PT SUBJECTIVE Outcome Measures 6/13/22 7/25/22   ABC  70%             Today's Treatment::    Education provided:  Yes: See treatment log for details of education provided               P.T. TREATMENT LOG   Precautions:    Eval Date: 6/8/22 Progress Note: 7/25/2022   POC expires: 8/7/22 Insurance Limits:   Treatment Current Session Time   CANALITH  REPOSITIONING TREATMENT  (CPT 83907) Y/N Notes Not performed   CRT         NEURO RE-ED  (CPT 30927) Y/N Notes 38-52  Minutes   BPPV ASSESSMENT N No subjective c/o dizziness   Vestibulo oculomotor exam    (6/8) See objective section   VOR CANCELLATION                       Standing H/VVOR-C no HVOR SSV 30 sec x 2  VVOR SSV 30 sec x 2 looking up pt has retinal slip   Ambulation w/ H/VVOR-C       VOR / GAZE STABILITY       Seated H/VVOR   Retinal slip during L head turn at SSV   Standing H/VVOR no                    no                    no Standing FA 5' from 1\" B on plain background  HVORx1: 120 bpm x60\", no dizziness or blurring, mild balance  VVORx1: 120 bpm x60\", no retinal sleip, no balance deficit  *pt reports 140 bpm for HEP; pt terminates      Standing, semi tandem, 5' from 1\" B on plain background  HVORx1: 105 bpm x60\", increase in lateral sway, no dizziness  VVORx1: 105 bpm x60\",  increase in lateral sway, no dizziness     SSV Standing on airex FA central and peripheral distraction with yellow wands  HVOR: 30 sec x 2 no difficulty   VVOR 30 sec x 2 no difficulty   Ambulation w/H/VVOR no HVOR: Central " "and peripheral distraction mild balance deficits  VVOR: Cental and peripheral distraction with wands mild balance deficits   H/VVOR on compliant surfaces       Functional VOR       HABITUATION       Ball circles       Repetitive functional movements       BALANCE- STATIC       On floor Y Tandem 30\"x2  Semi-tandem with head turns x10  Semi- tandem with head nods x10  Tandem 4x15\" EO   Airex foam Y NBOS with head turns x10  NBOS with head nods x10  FT with EC 30\"x2  Tandem on 2x30\"  Marching in place-2x10 no UE support   Rockerboard Y AP weight shifting x10- no UE support  ML weight shifting x10-no UE support  AP center balance 2x30\"- light UUE support  ML center balance 2x30\"- light UUE support   BALANCE- DYNAMIC       Ambulation-head turns/nods Y Walking with head turns - 2x80 ft  Walking with head nods - 2x80 ft  Walking backwards - 2x80 ft    Ambulation - 180 & 360 degree turns Y Walking with 180* turns - 2x80 ft   Retro ambulation EO       Ambulation with EC       Obstacles       Bosu Step up NV     Single leg marches       Tandem walking nv 3 laps in // bars no UE support   OKS       MSQ  SOT  FGA  DVA         (6/8) 0%     (6/13) 20/30   THER-EX   (CPT 70833) Y/N SETS REPS LOAD Not performed   CARDIOVASCULAR                       THER ACT  (CPT 44128) Y/N   8-22 Minutes    Progress Note Yes     Review pain, meds, falls, vitals, symptoms       *Subjective Measures   ABC            (6/8) 76%   Patient Education/HEP   (6/8) Results of physical exam, central vs peripheral signs, vestibular system anatomy and physiology, VOR. Demonstrates good understanding.      (6/13) Initiated HEP with VORx1, handout provided and reviewed.         Progress note administered today as 30 days have occurred since IE. Goals are assessed, new ABC goals is completed at 70%. FGA increases to 21/30, no significant change noted. Pt subjectively reports feeling more confident in her balance after stating, \"I actually did not think about my " "balance much over the weekend.\" After goals are all assessed, pt continues to NMRE without an overt loss of balance. PT educates pt on the 3 components to balance and how each one is affected. Pt states that she understands, however further education may be warranted. Pt denies dizziness/pain post session. Pt would continue to benefit from skilled OP PT intervention in order to increase functional mobility and return to prior lifestyle.         Goals Addressed                    This Visit's Progress    •  [facility] PT Vestibular Goals         Vestibular PT Goals   Long Term Goals Time Frame Result Comment/Progress   Patient will increase FGA score to >/= 28/30 for increased gait stability and balance.   6-8 weeks Ongoing  IE - 20/30 7/25 - 21/30   Patient will perform home exercise program independently.   6-8 weeks Progressing     Patient will demonstrate independence with managing any residual symptoms. 6-8 weeks Progressing     Patient will be able to walk recreationally without \"drifting\".  6-8 weeks Ongoing  Per subjective report, \"I have not noticed it as much.\"   Patient will improve score on ABC to 87% for decreased report of symptoms with daily activities. 6-8 weeks New  7/25 - 70%   Patient will have no increased symptoms with challenging VOR activities for symptom free high level activities.   6-8 weeks Met  No longer primary complaint            •  Mutually agreed upon pain goal         Mutually agreed upon pain goal: n/a          •  patient stated goal (pt-stated)         \"I want to walk without drifting.\"                              "

## 2022-07-25 NOTE — OP PT TREATMENT LOG
"  P.T. TREATMENT LOG   Precautions:    Eval Date: 6/8/22 Progress Note: 7/25/2022   POC expires: 8/7/22 Insurance Limits:   Treatment Current Session Time   CANALITH  REPOSITIONING TREATMENT  (CPT 23758) Y/N Notes Not performed   CRT         NEURO RE-ED  (CPT 03993) Y/N Notes 38-52  Minutes   BPPV ASSESSMENT N No subjective c/o dizziness   Vestibulo oculomotor exam    (6/8) See objective section   VOR CANCELLATION                       Standing H/VVOR-C no HVOR SSV 30 sec x 2  VVOR SSV 30 sec x 2 looking up pt has retinal slip   Ambulation w/ H/VVOR-C       VOR / GAZE STABILITY       Seated H/VVOR   Retinal slip during L head turn at SSV   Standing H/VVOR no                    no                    no Standing FA 5' from 1\" B on plain background  HVORx1: 120 bpm x60\", no dizziness or blurring, mild balance  VVORx1: 120 bpm x60\", no retinal sleip, no balance deficit  *pt reports 140 bpm for HEP; pt terminates      Standing, semi tandem, 5' from 1\" B on plain background  HVORx1: 105 bpm x60\", increase in lateral sway, no dizziness  VVORx1: 105 bpm x60\",  increase in lateral sway, no dizziness     SSV Standing on airex FA central and peripheral distraction with yellow wands  HVOR: 30 sec x 2 no difficulty   VVOR 30 sec x 2 no difficulty   Ambulation w/H/VVOR no HVOR: Central and peripheral distraction mild balance deficits  VVOR: Cental and peripheral distraction with wands mild balance deficits   H/VVOR on compliant surfaces       Functional VOR       HABITUATION       Ball circles       Repetitive functional movements       BALANCE- STATIC       On floor Y Tandem 30\"x2  Semi-tandem with head turns x10  Semi- tandem with head nods x10  Tandem 4x15\" EO   Airex foam Y NBOS with head turns x10  NBOS with head nods x10  FT with EC 30\"x2  Tandem on 2x30\"  Marching in place-2x10 no UE support   Rockerboard Y AP weight shifting x10- no UE support  ML weight shifting x10-no UE support  AP center balance 2x30\"- light UUE " "support  ML center balance 2x30\"- light UUE support   BALANCE- DYNAMIC       Ambulation-head turns/nods Y Walking with head turns - 2x80 ft  Walking with head nods - 2x80 ft  Walking backwards - 2x80 ft    Ambulation - 180 & 360 degree turns Y Walking with 180* turns - 2x80 ft   Retro ambulation EO       Ambulation with EC       Obstacles       Bosu Step up NV     Single leg marches       Tandem walking nv 3 laps in // bars no UE support   OKS       MSQ  SOT  FGA  DVA         (6/8) 0%     (6/13) 20/30   THER-EX   (CPT 49303) Y/N SETS REPS LOAD Not performed   CARDIOVASCULAR                       THER ACT  (CPT 26895) Y/N   8-22 Minutes    Progress Note Yes     Review pain, meds, falls, vitals, symptoms       *Subjective Measures   ABC            (6/8) 76%   Patient Education/HEP   (6/8) Results of physical exam, central vs peripheral signs, vestibular system anatomy and physiology, VOR. Demonstrates good understanding.      (6/13) Initiated HEP with VORx1, handout provided and reviewed.         Progress note administered today as 30 days have occurred since IE. Goals are assessed, new ABC goals is completed at 70%. FGA increases to 21/30, no significant change noted. Pt subjectively reports feeling more confident in her balance after stating, \"I actually did not think about my balance much over the weekend.\" After goals are all assessed, pt continues to NMRE without an overt loss of balance. PT educates pt on the 3 components to balance and how each one is affected. Pt states that she understands, however further education may be warranted. Pt denies dizziness/pain post session. Pt would continue to benefit from skilled OP PT intervention in order to increase functional mobility and return to prior lifestyle.    "

## 2022-08-01 ENCOUNTER — HOSPITAL ENCOUNTER (OUTPATIENT)
Dept: PHYSICAL THERAPY | Facility: CLINIC | Age: 77
Setting detail: THERAPIES SERIES
Discharge: HOME | End: 2022-08-01
Attending: OTOLARYNGOLOGY
Payer: MEDICARE

## 2022-08-01 ENCOUNTER — APPOINTMENT (OUTPATIENT)
Dept: PREADMISSION TESTING | Facility: HOSPITAL | Age: 77
End: 2022-08-01
Payer: MEDICARE

## 2022-08-01 VITALS — BODY MASS INDEX: 23.32 KG/M2 | WEIGHT: 140 LBS | HEIGHT: 65 IN

## 2022-08-01 DIAGNOSIS — R42 DIZZINESS AND GIDDINESS: Primary | ICD-10-CM

## 2022-08-01 PROCEDURE — 97112 NEUROMUSCULAR REEDUCATION: CPT | Mod: GP

## 2022-08-01 PROCEDURE — 97110 THERAPEUTIC EXERCISES: CPT | Mod: GP

## 2022-08-01 ASSESSMENT — PAIN SCALES - GENERAL: PAINLEVEL: 0-NO PAIN

## 2022-08-01 NOTE — PROGRESS NOTES
PT DAILY NOTE FOR OUTPATIENT THERAPY    Patient: Katheryn Goncalves MRN: [id number]  : 1945 76 y.o.  Referring Physician: Jaydon Childs MD  Date of Visit: 2022    Certification Dates: 22 through 22    Diagnosis:   1. Dizziness and giddiness        Chief Complaints:  Imbalance    Precautions:   Existing Precautions/Restrictions: no known precautions/restrictions     TODAY'S VISIT    Time In Session:  Start Time: 1602  Stop Time: 1657  Time Calculation (min): 55 min   History/Vitals/Pain/Encounter Info - 22 1608        Injury History/Precautions/Daily Required Info    Document Type daily treatment     Primary Therapist Rodger Hardy, [place]     Chief Complaint/Reason for Visit  Imbalance     Referring Physician Jaydon Childs MD     Existing Precautions/Restrictions no known precautions/restrictions     History of present illness/functional impairment Patient presents with intermittent imbalance during functional activit. April of last year had ENT evaluation with Dr. Cole following onset of vertigo s/p COVID vaccine, and was noted to have decreased hearing and will be getting hearing aids. Was felt that she had labyrinthitis which resolved within a month. Patient noted onset of imabalnce within the last 2 months after having an upper respiratory infection which she thinks she acquired from her grandchildren. Was COVID-negative. Returned to see Dr. Cole, who given the recurrent nature of her imbalance, wants her to go to vestibular therapy.  MRI within normal limits within the past year. Patient denies dizziness or vertigo since onset of imbalance. Notes veering laterally when walking with friends for recreation. Presents to OPPT with goals of normalizing balance to optimize function and safety,     Patient reported fall since last visit No        Pain Assessment    Currently in pain No/Denies        Pain Intervention    Intervention  none     Post Intervention Comments none      "           Daily Treatment Assessment and Plan - 08/01/22 1608        Daily Treatment Assessment and Plan    Progress toward goals Progressing     Daily Outcome Summary Pt presents to session today without pain, prepared for OP PT. She states that she is scheduled for surgery next week, discharge scheduled for 8/8/2022 and appears appropriate for discharge on that today. Today's focus on preparing finalized HEP. Pt begins on rec bike for an active warm up, followed by NMRE exercises. Finalized HEP is created, reviewed with pt, questions are asked and addressed. Pt reports verbally that HEP can be completed independently at home. Additional exercise of obstacle course completed today with verbal instructions from PT to have pt enter/exit obstacle through different directions via different surfaces; pt completes without an overt loss of balance. Pt denies pain post session. Pt would continue to benefit from skilled OP PT intervention in order to increase functional mobility and return to prior lifestyle.     Plan and Recommendations d/c next session                     OBJECTIVE DATA TAKEN TODAY:    None taken    Today's Treatment:               P.T. TREATMENT LOG   Precautions:    Eval Date: 6/8/22 Progress Note: 7/25/2022   POC expires: 8/7/22 Insurance Limits:   Treatment Current Session Time   CANALITH  REPOSITIONING TREATMENT  (CPT 89663) Y/N Notes Not performed   CRT         NEURO RE-ED  (CPT 84808) Y/N Notes 38-52  Minutes   BPPV ASSESSMENT N No subjective c/o dizziness   Vestibulo oculomotor exam    (6/8) See objective section   VOR CANCELLATION                       Standing H/VVOR-C no HVOR SSV 30 sec x 2  VVOR SSV 30 sec x 2 looking up pt has retinal slip   Ambulation w/ H/VVOR-C       VOR / GAZE STABILITY       Seated H/VVOR   Retinal slip during L head turn at SSV   Standing H/VVOR no                    no                    no Standing FA 5' from 1\" B on plain background  HVORx1: 120 bpm x60\", no " "dizziness or blurring, mild balance  VVORx1: 120 bpm x60\", no retinal sleip, no balance deficit  *pt reports 140 bpm for HEP; pt terminates      Standing, semi tandem, 5' from 1\" B on plain background  HVORx1: 105 bpm x60\", increase in lateral sway, no dizziness  VVORx1: 105 bpm x60\",  increase in lateral sway, no dizziness     SSV Standing on airex FA central and peripheral distraction with yellow wands  HVOR: 30 sec x 2 no difficulty   VVOR 30 sec x 2 no difficulty   Ambulation w/H/VVOR no HVOR: Central and peripheral distraction mild balance deficits  VVOR: Cental and peripheral distraction with wands mild balance deficits   H/VVOR on compliant surfaces       Functional VOR       HABITUATION       Ball circles       Repetitive functional movements       BALANCE- STATIC       On floor Y Tandem 30\"x2  Semi-tandem with head turns x10  Semi- tandem with head nods x10  Tandem 4x15\" EO   Airex foam Y NBOS with head turns x10  NBOS with head nods x10  FT with EC 30\"x2  Tandem on 2x30\"  Marching in place-2x10 no UE support   Rockerboard Y AP weight shifting x10- no UE support  ML weight shifting x10-no UE support  AP center balance 2x30\"- light UUE support  ML center balance 2x30\"- light UUE support   BALANCE- DYNAMIC       Ambulation-head turns/nods Y Walking with head turns - 2x80 ft  Walking with head nods - 2x80 ft  Walking backwards - 2x80 ft    Ambulation - 180 & 360 degree turns Y Walking with 180* turns - 2x80 ft   Retro ambulation EO       Ambulation with EC       Obstacles yes Obstacle course consisting air ex pads, river stones, air pads and small hurdles  x8 laps navigating via various surfaces with minimal firm surface contact  -close supervision throughout the session.   Bosu Step up NV     Single leg marches       Tandem walking yes 3 laps in // bars no UE support   OKS       MSQ  SOT  FGA  DVA         (6/8) 0%     (6/13) 20/30   THER-EX   (CPT 27107) Y/N SETS REPS LOAD 8-22 Minutes    CARDIOVASCULAR     "   Rec bike yes x8 min           THER ACT  (CPT 89910) Y/N   Not performed     Progress Note no    Review pain, meds, falls, vitals, symptoms       *Subjective Measures   ABC            (6/8) 76%   Patient Education/HEP   (6/8) Results of physical exam, central vs peripheral signs, vestibular system anatomy and physiology, VOR. Demonstrates good understanding.      (6/13) Initiated HEP with VORx1, handout provided and reviewed.

## 2022-08-01 NOTE — OP PT TREATMENT LOG
"  P.T. TREATMENT LOG   Precautions:    Eval Date: 6/8/22 Progress Note: 7/25/2022   POC expires: 8/7/22 Insurance Limits:   Treatment Current Session Time   CANALITH  REPOSITIONING TREATMENT  (CPT 26534) Y/N Notes Not performed   CRT         NEURO RE-ED  (CPT 65481) Y/N Notes 38-52  Minutes   BPPV ASSESSMENT N No subjective c/o dizziness   Vestibulo oculomotor exam    (6/8) See objective section   VOR CANCELLATION                       Standing H/VVOR-C no HVOR SSV 30 sec x 2  VVOR SSV 30 sec x 2 looking up pt has retinal slip   Ambulation w/ H/VVOR-C       VOR / GAZE STABILITY       Seated H/VVOR   Retinal slip during L head turn at SSV   Standing H/VVOR no                    no                    no Standing FA 5' from 1\" B on plain background  HVORx1: 120 bpm x60\", no dizziness or blurring, mild balance  VVORx1: 120 bpm x60\", no retinal sleip, no balance deficit  *pt reports 140 bpm for HEP; pt terminates      Standing, semi tandem, 5' from 1\" B on plain background  HVORx1: 105 bpm x60\", increase in lateral sway, no dizziness  VVORx1: 105 bpm x60\",  increase in lateral sway, no dizziness     SSV Standing on airex FA central and peripheral distraction with yellow wands  HVOR: 30 sec x 2 no difficulty   VVOR 30 sec x 2 no difficulty   Ambulation w/H/VVOR no HVOR: Central and peripheral distraction mild balance deficits  VVOR: Cental and peripheral distraction with wands mild balance deficits   H/VVOR on compliant surfaces       Functional VOR       HABITUATION       Ball circles       Repetitive functional movements       BALANCE- STATIC       On floor Y Tandem 30\"x2  Semi-tandem with head turns x10  Semi- tandem with head nods x10  Tandem 4x15\" EO   Airex foam Y NBOS with head turns x10  NBOS with head nods x10  FT with EC 30\"x2  Tandem on 2x30\"  Marching in place-2x10 no UE support   Rockerboard Y AP weight shifting x10- no UE support  ML weight shifting x10-no UE support  AP center balance 2x30\"- light UUE " "support  ML center balance 2x30\"- light UUE support   BALANCE- DYNAMIC       Ambulation-head turns/nods Y Walking with head turns - 2x80 ft  Walking with head nods - 2x80 ft  Walking backwards - 2x80 ft    Ambulation - 180 & 360 degree turns Y Walking with 180* turns - 2x80 ft   Retro ambulation EO       Ambulation with EC       Obstacles yes Obstacle course consisting air ex pads, river stones, air pads and small hurdles  x8 laps navigating via various surfaces with minimal firm surface contact  -close supervision throughout the session.   Bosu Step up NV     Single leg marches       Tandem walking yes 3 laps in // bars no UE support   OKS       MSQ  SOT  FGA  DVA         (6/8) 0%     (6/13) 20/30   THER-EX   (CPT 44389) Y/N SETS REPS LOAD 8-22 Minutes    CARDIOVASCULAR       Rec bike yes x8 min           THER ACT  (CPT 94471) Y/N   Not performed     Progress Note no    Review pain, meds, falls, vitals, symptoms       *Subjective Measures   ABC            (6/8) 76%   Patient Education/HEP   (6/8) Results of physical exam, central vs peripheral signs, vestibular system anatomy and physiology, VOR. Demonstrates good understanding.      (6/13) Initiated HEP with VORx1, handout provided and reviewed.           "

## 2022-08-01 NOTE — PRE-PROCEDURE INSTRUCTIONS
1.      You will be called between 3pm -7pm one business day before your procedure  August 9, 2022   to tell you where and when to report.  If you do not receive a phone call by 7pm, please call the Admissions office at [phone] to determine the arrival time for your procedure.      2.        Please report to Admissions or Short Procedure Unit , park in lot A, on the day of your procedure.  Detailed directions will be given to you when you are called with arrival time.      3.      No solid food for EIGHT HOURS prior to surgery.  Unlimited CLEAR liquids, meaning water or PLAIN black coffee (WITHOUT any milk, cream, sugar, or sweetener) are permitted up to TWO HOURS prior to arrival at the hospital.      4.      Early on the morning of the procedure please take your usual dose of the listed medications with a sip of water:  Nexium and Levothyroxine     5.      Other Instructions: You may brush your teeth the morning of the procedure. Rinse and spit, do not swallow.  Bring a list of your medications with dosages with you.  Use surgical wash as directed.  STOP all Motrin, Aleve, Ibuprofen, Advil,Aspirin, Vitamins and herbal supplements. Tylenol is OK   6.      If you develop a cold, cough, fever, rash, or other symptom prior to the data of the procedure, please report it to your physician immediately.     7.      If you need to cancel the procedure for any reason, please contact your physician.     8.      Make arrangements to have someone drive you home from the procedure. If you have not arranged for transportation home, your surgery may be cancelled.      9.      You may not take public transportation unless you are accompanied by a responsible person.     10.      You may not drive a car or operate complex or potentially dangerous machinery for 24 hours following anesthesia and/or sedation.      11.      12.      If it is medically necessary for you to have a longer stay, you will be informed as soon as the  decision is made.              Only bring essential items to the hospital.  Do not wear or bring anything of value to the hospital including jewelry of any kind, money, or wallet. Do not wear make-up or contact lenses. Do not BRING MEDICATIONS FROM HOME unless instructed to do so.  DO bring your hearing aids, glasses, and a case.        13.      No lotion, creams, powders, or oils on skin the morning of procedure        14.      Dress in comfortable clothes.     15.      If instructed, please bring a copy of your Advanced Directive (Living Will/Durable Power of ) on the day of your procedure.      16.      17.            18.       19.       Patients need to quarantine from the time of PAT COVID test to day of surgery, regardless of COVID vaccine status.         Ensuring your safety at all times is a very important part of out [facility] Culture of Safety . After having surgery and sedation, you are at risk for falling and balance issues.  Although you may feel awake, the effects of the medication can last up to 24 hours after anesthesia.  If you need to use the bathroom during your recovery period, nursing staff will escort you there and stay with you to ensure your safety.          Refrain from drinking alcohol and smoking cigarettes for 24 hours prior to surgery.         Shower with antibacterial soap (DIAL) the night before and morning of your procedure.  If your procedure indicates the need for CHG antiseptic was (Bactoshield or Hibiclens), please use this instead and follow instructions as discussed at the time of your Pre-Admission Testing visit or phone interview.     Above instructions reviewed with patient and patient acknowledges understanding.

## 2022-08-02 ENCOUNTER — HOSPITAL ENCOUNTER (OUTPATIENT)
Dept: CARDIOLOGY | Facility: HOSPITAL | Age: 77
Discharge: HOME | End: 2022-08-02
Attending: SURGERY
Payer: MEDICARE

## 2022-08-02 ENCOUNTER — CONSULT (OUTPATIENT)
Dept: SURGERY | Facility: CLINIC | Age: 77
End: 2022-08-02
Payer: MEDICARE

## 2022-08-02 VITALS
WEIGHT: 140 LBS | HEART RATE: 59 BPM | BODY MASS INDEX: 23.32 KG/M2 | DIASTOLIC BLOOD PRESSURE: 68 MMHG | HEIGHT: 65 IN | SYSTOLIC BLOOD PRESSURE: 137 MMHG

## 2022-08-02 DIAGNOSIS — Z01.818 PRE-OP EXAMINATION: ICD-10-CM

## 2022-08-02 DIAGNOSIS — Z01.818 PRE-OP EXAMINATION: Primary | ICD-10-CM

## 2022-08-02 LAB
ATRIAL RATE: 50
P AXIS: 54
PR INTERVAL: 160
QRS DURATION: 98
QT INTERVAL: 486
QTC CALCULATION(BAZETT): 443
R AXIS: -27
T WAVE AXIS: 52
VENTRICULAR RATE: 50

## 2022-08-02 PROCEDURE — 93010 ELECTROCARDIOGRAM REPORT: CPT | Performed by: INTERNAL MEDICINE

## 2022-08-02 PROCEDURE — 93005 ELECTROCARDIOGRAM TRACING: CPT

## 2022-08-02 PROCEDURE — 99214 OFFICE O/P EST MOD 30 MIN: CPT | Performed by: SURGERY

## 2022-08-02 NOTE — H&P (VIEW-ONLY)
Patient ID: Katheryn Goncalves                              : 1945  MRN: [id number]                                            Visit Date: 2022  Encounter Provider: James Bragg    Chief Complaint: Pre-op Exam    Katheryn Goncalves is returning for a preoperative visit.  She presented 1 year ago with a recurrent right femoral hernia.  At that time, I recommended a laparoscopic repair with mesh under general anesthesia.  She did not follow-up to schedule surgery.  She has now scheduled surgery for August 10.  She has no new complaints regarding the hernia.  She is aware of the bulge, but it does not bother her.    Prior note from 8/3/2021:  Katheryn Goncalves is a 75 y.o. female presenting for evaluation of a recurrent right femoral hernia.  She had an open right femoral hernia repair by Dr. Green in .  This was for an incarcerated hernia causing a bowel obstruction.  She presented to her PCP in  of this year complaining of left inguinal pain after carrying her grandchild.  She was found to have tenderness over the left trochanteric bursa.  She was also found to have a recurrent hernia on the right side and was referred for evaluation.  She says her right inguinal region has not changed since the surgery in 2018.  She denies pain in the right inguinal region, but she is aware of some discomfort at times.  She says she favors the right side when she carries a grandchild or groceries.    Past Medical History:  has a past medical history of Marshall esophagus, High cholesterol, Hypothyroidism, and Osteoporosis.  Past Surgical History:  has a past surgical history that includes Cholecystectomy; Open right femoral hernia repair  without mesh by Dr. Green () during which the right inguinal ligament was incised; trans-vaginal hysterectomy (); vaginal sling, and vaginal sling revision .  Social History:  reports that she has never smoked. She has never used smokeless tobacco. She reports that  "she does not drink alcohol and does not use drugs. She is a retired teacher.  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    Family History: family history includes Colon cancer in her biological mother; Heart disease in her biological father.  Bilateral inguinal hernias in her father and her son.  Allergies: has No Known Allergies.     Medications:   Current Outpatient Medications:   •  acetaminophen (TYLENOL) 500 mg tablet, Take 500 mg by mouth every 6 (six) hours as needed for mild pain (Takes PRN only)., Disp: , Rfl:   •  atorvastatin (LIPITOR) 20 mg tablet, TAKE 1 TABLET BY MOUTH EVERY DAY (Patient taking differently: Take 20 mg by mouth every evening.), Disp: 90 tablet, Rfl: 3  •  denosumab (PROLIA) 60 mg/mL syringe, Inject 60 mg under the skin once. Injection 2 x yearly, Disp: , Rfl:   •  esomeprazole (NexIUM) 40 mg capsule, Take 1 capsule (40 mg total) by mouth daily., Disp: 90 capsule, Rfl: 0  •  estradioL (ESTRACE) 0.01 % (0.1 mg/gram) vaginal cream, Insert 2 g into the vagina 2 (two) times a week (Mon, Thu)., Disp: , Rfl:   •  levothyroxine 88 mcg tablet, TAKE 1 TABLET BY MOUTH EVERY DAY (Patient taking differently: Take 88 mcg by mouth daily.), Disp: 90 tablet, Rfl: 3    Physical Exam  Visit Vitals  /68   Pulse (!) 59   Ht 1.651 m (5' 5\")   Wt 63.5 kg (140 lb)   BMI 23.30 kg/m²     General appearance: Very pleasant, no acute distress. Alert, responds appropriately.   HEENT: Normocephalic, without obvious abnormality. Conjunctiva clear. Sclerae anicteric. Isolation mask in place. No cough.   Abdomen: Soft, nontender and nondistended.  Well-healed right inguinal scar.  There is a moderate sized right inguinal/femoral hernia that is nontender and easily reducible.  No other visible scars.  Extremities: Warm and well perfused. No edema.  Neurologic:  Grossly normal. Normal mood and affect.    Assessment and Plan:  Recurrent right femoral/inguinal hernia.  We again discussed the risks and benefits of " surgery.  Laparoscopic right inguinal hernia repair with mesh under general anesthesia is scheduled for next week.    James Bragg MD

## 2022-08-02 NOTE — PROGRESS NOTES
Patient ID: Katheryn Goncalves                              : 1945  MRN: [id number]                                            Visit Date: 2022  Encounter Provider: James Bragg    Chief Complaint: Pre-op Exam    Katheryn Goncalves is returning for a preoperative visit.  She presented 1 year ago with a recurrent right femoral hernia.  At that time, I recommended a laparoscopic repair with mesh under general anesthesia.  She did not follow-up to schedule surgery.  She has now scheduled surgery for August 10.  She has no new complaints regarding the hernia.  She is aware of the bulge, but it does not bother her.    Prior note from 8/3/2021:  Katheryn Goncalves is a 75 y.o. female presenting for evaluation of a recurrent right femoral hernia.  She had an open right femoral hernia repair by Dr. Green in .  This was for an incarcerated hernia causing a bowel obstruction.  She presented to her PCP in  of this year complaining of left inguinal pain after carrying her grandchild.  She was found to have tenderness over the left trochanteric bursa.  She was also found to have a recurrent hernia on the right side and was referred for evaluation.  She says her right inguinal region has not changed since the surgery in 2018.  She denies pain in the right inguinal region, but she is aware of some discomfort at times.  She says she favors the right side when she carries a grandchild or groceries.    Past Medical History:  has a past medical history of Marshall esophagus, High cholesterol, Hypothyroidism, and Osteoporosis.  Past Surgical History:  has a past surgical history that includes Cholecystectomy; Open right femoral hernia repair  without mesh by Dr. Green () during which the right inguinal ligament was incised; trans-vaginal hysterectomy (); vaginal sling, and vaginal sling revision .  Social History:  reports that she has never smoked. She has never used smokeless tobacco. She reports that  "she does not drink alcohol and does not use drugs. She is a retired teacher.  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    Family History: family history includes Colon cancer in her biological mother; Heart disease in her biological father.  Bilateral inguinal hernias in her father and her son.  Allergies: has No Known Allergies.     Medications:   Current Outpatient Medications:   •  acetaminophen (TYLENOL) 500 mg tablet, Take 500 mg by mouth every 6 (six) hours as needed for mild pain (Takes PRN only)., Disp: , Rfl:   •  atorvastatin (LIPITOR) 20 mg tablet, TAKE 1 TABLET BY MOUTH EVERY DAY (Patient taking differently: Take 20 mg by mouth every evening.), Disp: 90 tablet, Rfl: 3  •  denosumab (PROLIA) 60 mg/mL syringe, Inject 60 mg under the skin once. Injection 2 x yearly, Disp: , Rfl:   •  esomeprazole (NexIUM) 40 mg capsule, Take 1 capsule (40 mg total) by mouth daily., Disp: 90 capsule, Rfl: 0  •  estradioL (ESTRACE) 0.01 % (0.1 mg/gram) vaginal cream, Insert 2 g into the vagina 2 (two) times a week (Mon, Thu)., Disp: , Rfl:   •  levothyroxine 88 mcg tablet, TAKE 1 TABLET BY MOUTH EVERY DAY (Patient taking differently: Take 88 mcg by mouth daily.), Disp: 90 tablet, Rfl: 3    Physical Exam  Visit Vitals  /68   Pulse (!) 59   Ht 1.651 m (5' 5\")   Wt 63.5 kg (140 lb)   BMI 23.30 kg/m²     General appearance: Very pleasant, no acute distress. Alert, responds appropriately.   HEENT: Normocephalic, without obvious abnormality. Conjunctiva clear. Sclerae anicteric. Isolation mask in place. No cough.   Abdomen: Soft, nontender and nondistended.  Well-healed right inguinal scar.  There is a moderate sized right inguinal/femoral hernia that is nontender and easily reducible.  No other visible scars.  Extremities: Warm and well perfused. No edema.  Neurologic:  Grossly normal. Normal mood and affect.    Assessment and Plan:  Recurrent right femoral/inguinal hernia.  We again discussed the risks and benefits of " surgery.  Laparoscopic right inguinal hernia repair with mesh under general anesthesia is scheduled for next week.    James Bragg MD

## 2022-08-08 ENCOUNTER — APPOINTMENT (OUTPATIENT)
Dept: LAB | Facility: HOSPITAL | Age: 77
End: 2022-08-08
Attending: SURGERY
Payer: MEDICARE

## 2022-08-08 ENCOUNTER — HOSPITAL ENCOUNTER (OUTPATIENT)
Dept: PHYSICAL THERAPY | Facility: CLINIC | Age: 77
Setting detail: THERAPIES SERIES
Discharge: HOME | End: 2022-08-08
Attending: OTOLARYNGOLOGY
Payer: MEDICARE

## 2022-08-08 DIAGNOSIS — R42 DIZZINESS AND GIDDINESS: Primary | ICD-10-CM

## 2022-08-08 DIAGNOSIS — Z01.818 PRE-OP EXAMINATION: ICD-10-CM

## 2022-08-08 DIAGNOSIS — H92.03 OTALGIA, BILATERAL: ICD-10-CM

## 2022-08-08 DIAGNOSIS — H83.02 LABYRINTHITIS OF LEFT EAR: ICD-10-CM

## 2022-08-08 LAB
ANION GAP SERPL CALC-SCNC: 5 MEQ/L (ref 3–15)
BUN SERPL-MCNC: 11 MG/DL (ref 8–20)
CALCIUM SERPL-MCNC: 9.5 MG/DL (ref 8.9–10.3)
CHLORIDE SERPL-SCNC: 102 MEQ/L (ref 98–109)
CO2 SERPL-SCNC: 28 MEQ/L (ref 22–32)
CREAT SERPL-MCNC: 0.6 MG/DL (ref 0.6–1.1)
ERYTHROCYTE [DISTWIDTH] IN BLOOD BY AUTOMATED COUNT: 11.8 % (ref 11.7–14.4)
GFR SERPL CREATININE-BSD FRML MDRD: >60 ML/MIN/1.73M*2
GLUCOSE SERPL-MCNC: 80 MG/DL (ref 70–99)
HCT VFR BLDCO AUTO: 38.2 % (ref 35–45)
HGB BLD-MCNC: 13.4 G/DL (ref 11.8–15.7)
MCH RBC QN AUTO: 32.6 PG (ref 28–33.2)
MCHC RBC AUTO-ENTMCNC: 35.1 G/DL (ref 32.2–35.5)
MCV RBC AUTO: 92.9 FL (ref 83–98)
PDW BLD AUTO: 9.5 FL (ref 9.4–12.3)
PLATELET # BLD AUTO: 197 K/UL (ref 150–369)
POTASSIUM SERPL-SCNC: 5.2 MEQ/L (ref 3.6–5.1)
RBC # BLD AUTO: 4.11 M/UL (ref 3.93–5.22)
SARS-COV-2 RNA RESP QL NAA+PROBE: NEGATIVE
SODIUM SERPL-SCNC: 135 MEQ/L (ref 136–144)
WBC # BLD AUTO: 4.88 K/UL (ref 3.8–10.5)

## 2022-08-08 PROCEDURE — 97530 THERAPEUTIC ACTIVITIES: CPT | Mod: GP

## 2022-08-08 PROCEDURE — 97112 NEUROMUSCULAR REEDUCATION: CPT | Mod: GP

## 2022-08-08 PROCEDURE — 36415 COLL VENOUS BLD VENIPUNCTURE: CPT

## 2022-08-08 PROCEDURE — 80048 BASIC METABOLIC PNL TOTAL CA: CPT

## 2022-08-08 PROCEDURE — U0003 INFECTIOUS AGENT DETECTION BY NUCLEIC ACID (DNA OR RNA); SEVERE ACUTE RESPIRATORY SYNDROME CORONAVIRUS 2 (SARS-COV-2) (CORONAVIRUS DISEASE [COVID-19]), AMPLIFIED PROBE TECHNIQUE, MAKING USE OF HIGH THROUGHPUT TECHNOLOGIES AS DESCRIBED BY CMS-2020-01-R: HCPCS

## 2022-08-08 PROCEDURE — 85027 COMPLETE CBC AUTOMATED: CPT

## 2022-08-08 PROCEDURE — C9803 HOPD COVID-19 SPEC COLLECT: HCPCS

## 2022-08-08 NOTE — PROGRESS NOTES
PT DISCHARGE NOTE FOR OUTPATIENT THERAPY    Patient: Katheryn Goncalves MRN: [id number]  : 1945 76 y.o.  Referring Physician: Jaydon Childs MD  Date of Visit: 2022      Certification Dates: 22 through 22      Chief Complaints:  No chief complaint on file.      Precautions:  Existing Precautions/Restrictions: no known precautions/restrictions     TODAY'S VISIT:    Time In Session:  Start Time: 1800  Stop Time: 1830  Time Calculation (min): 30 min   General Information - 22 1808        Session Details    Document Type discharge evaluation     Mode of Treatment physical therapy;individual therapy     Patient/Family/Caregiver Comments/Observations Pt arrives to PT today reporting feeling she has improved in her ability to complete all exercises and that balance exercises have gotten easier. She feels that she walks better than before she started therapy. She is able to complete 100% of her normal activities.        General Information    Referring Physician Jaydon Childs MD     History of present illness/functional impairment Patient presents with intermittent imbalance during functional activit. April of last year had ENT evaluation with Dr. Cole following onset of vertigo s/p COVID vaccine, and was noted to have decreased hearing and will be getting hearing aids. Was felt that she had labyrinthitis which resolved within a month. Patient noted onset of imabalnce within the last 2 months after having an upper respiratory infection which she thinks she acquired from her grandchildren. Was COVID-negative. Returned to see Dr. Cole, who given the recurrent nature of her imbalance, wants her to go to vestibular therapy.  MRI within normal limits within the past year. Patient denies dizziness or vertigo since onset of imbalance. Notes veering laterally when walking with friends for recreation. Presents to OPPT with goals of normalizing balance to optimize function and safety,      Existing Precautions/Restrictions no known precautions/restrictions                Daily Falls Screen - 08/08/22 1808        Daily Falls Assessment    Patient reported fall since last visit No                Pain/Vitals - 08/08/22 1808        Pain Assessment    Currently in pain No/Denies        Pain Intervention    Intervention  na     Post Intervention Comments na                PT - 08/08/22 1808        Physical Therapy    Physical Therapy Specialty Vestibular Rehab        PT Plan    Frequency of treatment 1 time/week     PT Duration 8 weeks     PT Cert From 06/08/22     PT Cert To 08/07/22     Date PT POC was sent to provider 06/08/22     Signed PT Plan of Care received?  Yes                Assessment and Plan - 08/08/22 1808        Assessment    Plan of Care reviewed and patient/family in agreement Yes     Clinical Assessment Katheryn has participated in 6 weeks of PT to address impairments. ABC score and FGA score have improved significantly and are above falls risk cutoff. Pt has been issued an HEP to maintain treatment gains that she is independent in completing. She has met all short and long term goals except for goal related to pt drifting while walking as pt does demonstrate occasional drift during ambulation. Pt is undergoing hernia repair later this week and will be unable to continue with therapy at this point as a result. She is prepared for D/C and was instructed to call clinic with any questions or concerns.     Plan and Recommendations D/C with independent HEP. Pt instructed to call clinic with any questions or concerns.                 OBJECTIVE MEASUREMENTS/DATA:    None taken (see goals section)      Outcome Measures    PT OBJECTIVE Outcome Measures 6/13/22 7/25/22   FGA 20 21      PT SUBJECTIVE Outcome Measures 6/13/22 7/25/22   ABC  70%             Today's Treatment:    Education provided:  None/NA               P.T. TREATMENT LOG   Precautions:    Eval Date: 6/8/22 Progress Note: 7/25/2022   POC  "expires: 8/7/22 Insurance Limits:   Treatment Current Session Time   CANALITH  REPOSITIONING TREATMENT  (CPT 39314) Y/N Notes Not performed   CRT         NEURO RE-ED  (CPT 04917) Y/N Notes 8-22 Minutes   Reassessed FGA, ABC Y    BPPV ASSESSMENT N No subjective c/o dizziness   Vestibulo oculomotor exam    (6/8) See objective section   VOR CANCELLATION                       Standing H/VVOR-C no HVOR SSV 30 sec x 2  VVOR SSV 30 sec x 2 looking up pt has retinal slip   Ambulation w/ H/VVOR-C       VOR / GAZE STABILITY       Seated H/VVOR   Retinal slip during L head turn at SSV   Standing H/VVOR no                    no                    no Standing FA 5' from 1\" B on plain background  HVORx1: 120 bpm x60\", no dizziness or blurring, mild balance  VVORx1: 120 bpm x60\", no retinal sleip, no balance deficit  *pt reports 140 bpm for HEP; pt terminates      Standing, semi tandem, 5' from 1\" B on plain background  HVORx1: 105 bpm x60\", increase in lateral sway, no dizziness  VVORx1: 105 bpm x60\",  increase in lateral sway, no dizziness     SSV Standing on airex FA central and peripheral distraction with yellow wands  HVOR: 30 sec x 2 no difficulty   VVOR 30 sec x 2 no difficulty   Ambulation w/H/VVOR no HVOR: Central and peripheral distraction mild balance deficits  VVOR: Cental and peripheral distraction with wands mild balance deficits   H/VVOR on compliant surfaces       Functional VOR       HABITUATION       Ball circles       Repetitive functional movements       BALANCE- STATIC       On floor  Tandem 30\"x2  Semi-tandem with head turns x10  Semi- tandem with head nods x10  Tandem 4x15\" EO   Airex foam  NBOS with head turns x10  NBOS with head nods x10  FT with EC 30\"x2  Tandem on 2x30\"  Marching in place-2x10 no UE support   Rockerboard  AP weight shifting x10- no UE support  ML weight shifting x10-no UE support  AP center balance 2x30\"- light UUE support  ML center balance 2x30\"- light UUE support   BALANCE- DYNAMIC   " "    Ambulation-head turns/nods  Walking with head turns - 2x80 ft  Walking with head nods - 2x80 ft  Walking backwards - 2x80 ft    Ambulation - 180 & 360 degree turns  Walking with 180* turns - 2x80 ft   Retro ambulation EO       Ambulation with EC       Obstacles  Obstacle course consisting air ex pads, river stones, air pads and small hurdles  x8 laps navigating via various surfaces with minimal firm surface contact  -close supervision throughout the session.   Bosu Step up      Single leg marches       Tandem walking  3 laps in // bars no UE support   OKS       MSQ  SOT  FGA  DVA         (6/8) 0%     (6/13) 20/30   THER-EX   (CPT 83108) Y/N SETS REPS LOAD Not performed    CARDIOVASCULAR       Rec bike  x8 min           THER ACT  (CPT 48333) Y/N   8-22 MInutes     Progress Note no    Review pain, meds, falls, vitals, symptoms       *Subjective Measures   ABC            (6/8) 76%   Patient Education/HEP  Y Educated on goals met, progress made, HEP. Pt verbalized understanding               Goals Addressed                    This Visit's Progress       Patient Stated    •  COMPLETED: patient stated goal (pt-stated)         \"I want to walk without drifting.\"--improved not fully met             Other    •  COMPLETED: [facility] PT Vestibular Goals         Vestibular PT Goals   Long Term Goals Time Frame Result Comment/Progress   Patient will increase FGA score to >/= 28/30 for increased gait stability and balance.   6-8 weeks Met IE - 20/30 7/25 - 21/30 8/8: 27/30   Patient will perform home exercise program independently.   6-8 weeks Met     Patient will demonstrate independence with managing any residual symptoms. 6-8 weeks Met     Patient will be able to walk recreationally without \"drifting\".  6-8 weeks Not met Per subjective report, \"I have not noticed it as much.\"   Patient will improve score on ABC to 87% for decreased report of symptoms with daily activities. 6-8 weeks Not met 7/25 - 70%  8/8: 84%   Patient will " have no increased symptoms with challenging VOR activities for symptom free high level activities.   6-8 weeks Met  No longer primary complaint            •  COMPLETED: Mutually agreed upon pain goal         Mutually agreed upon pain goal: n/a

## 2022-08-09 ENCOUNTER — ANESTHESIA EVENT (OUTPATIENT)
Dept: SURGERY | Facility: HOSPITAL | Age: 77
Setting detail: HOSPITAL OUTPATIENT SURGERY
End: 2022-08-09
Payer: MEDICARE

## 2022-08-09 NOTE — ANESTHESIA PREPROCEDURE EVALUATION
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   (+) Vertigo      Other   (+) COVID-19   (+) Hypothyroidism   (+) Recurrent UTI   (+) Upper respiratory tract infection       Anesthesia ROS/MED HX    Anesthesia History    Previous anesthetics  No history of anesthetic complications  Cardiovascular   dyslipidemia   Echocardiogram reviewed and ECG reviewed  Hematological    anemia  GI/Hepatic   GERD  Endo/Other   Hypothyroidism       Past Surgical History:   Procedure Laterality Date   • BLADDER SUSPENSION     • CHOLECYSTECTOMY     • FEMORAL HERNIA REPAIR Right     bowel dusky but OK - no resection   • TOTAL VAGINAL HYSTERECTOMY  1997       Physical Exam    Airway   Mallampati: II   TM distance: >3 FB   Neck ROM: full  Cardiovascular - normal   Rhythm: regular   Rate: normalPulmonary - normal   clear to auscultation        Anesthesia Plan    Plan: general   ASA 2  Anesthetic plan and risks discussed with: patient  Comments:    Plan: Discussed anesthesia with patient in detail. Consent was signed.

## 2022-08-10 ENCOUNTER — ANESTHESIA (OUTPATIENT)
Dept: SURGERY | Facility: HOSPITAL | Age: 77
Setting detail: HOSPITAL OUTPATIENT SURGERY
End: 2022-08-10
Payer: MEDICARE

## 2022-08-10 ENCOUNTER — HOSPITAL ENCOUNTER (OUTPATIENT)
Facility: HOSPITAL | Age: 77
Setting detail: HOSPITAL OUTPATIENT SURGERY
Discharge: HOME | End: 2022-08-10
Attending: SURGERY | Admitting: SURGERY
Payer: MEDICARE

## 2022-08-10 VITALS
RESPIRATION RATE: 16 BRPM | WEIGHT: 142 LBS | DIASTOLIC BLOOD PRESSURE: 64 MMHG | HEART RATE: 52 BPM | BODY MASS INDEX: 23.66 KG/M2 | OXYGEN SATURATION: 98 % | HEIGHT: 65 IN | TEMPERATURE: 97.6 F | SYSTOLIC BLOOD PRESSURE: 131 MMHG

## 2022-08-10 PROCEDURE — C1726 CATH, BAL DIL, NON-VASCULAR: HCPCS | Performed by: SURGERY

## 2022-08-10 PROCEDURE — 63600000 HC DRUGS/DETAIL CODE

## 2022-08-10 PROCEDURE — 27200000 HC STERILE SUPPLY: Performed by: SURGERY

## 2022-08-10 PROCEDURE — 71000011 HC PACU PHASE 1 EA ADDL MIN: Performed by: SURGERY

## 2022-08-10 PROCEDURE — 36000014 HC OR LEVEL 4 EA ADDL MIN: Performed by: SURGERY

## 2022-08-10 PROCEDURE — 25800000 HC PHARMACY IV SOLUTIONS: Performed by: NURSE ANESTHETIST, CERTIFIED REGISTERED

## 2022-08-10 PROCEDURE — 25000000 HC PHARMACY GENERAL: Performed by: NURSE ANESTHETIST, CERTIFIED REGISTERED

## 2022-08-10 PROCEDURE — C1781 MESH (IMPLANTABLE): HCPCS | Performed by: SURGERY

## 2022-08-10 PROCEDURE — 37000001 HC ANESTHESIA GENERAL: Performed by: SURGERY

## 2022-08-10 PROCEDURE — 71000001 HC PACU PHASE 1 INITIAL 30MIN: Performed by: SURGERY

## 2022-08-10 PROCEDURE — 63700000 HC SELF-ADMINISTRABLE DRUG

## 2022-08-10 PROCEDURE — 71000002 HC PACU PHASE 2 INITIAL 30MIN: Performed by: SURGERY

## 2022-08-10 PROCEDURE — 0YU74JZ SUPPLEMENT RIGHT FEMORAL REGION WITH SYNTHETIC SUBSTITUTE, PERCUTANEOUS ENDOSCOPIC APPROACH: ICD-10-PCS | Performed by: SURGERY

## 2022-08-10 PROCEDURE — 25000000 HC PHARMACY GENERAL: Performed by: SURGERY

## 2022-08-10 PROCEDURE — 63600000 HC DRUGS/DETAIL CODE: Mod: JW | Performed by: NURSE ANESTHETIST, CERTIFIED REGISTERED

## 2022-08-10 PROCEDURE — 25000000 HC PHARMACY GENERAL: Performed by: ANESTHESIOLOGY

## 2022-08-10 PROCEDURE — 49659 UNLSTD LAPS PX HRNAP HRNRPHY: CPT | Mod: RT | Performed by: SURGERY

## 2022-08-10 PROCEDURE — 63600000 HC DRUGS/DETAIL CODE: Performed by: NURSE ANESTHETIST, CERTIFIED REGISTERED

## 2022-08-10 PROCEDURE — 71000012 HC PACU PHASE 2 EA ADDL MIN: Performed by: SURGERY

## 2022-08-10 PROCEDURE — 36000004 HC OR LEVEL 4 INITIAL 30MIN: Performed by: SURGERY

## 2022-08-10 DEVICE — LAPROSCOPIC PROGRIP RIGHT MESH 10X15: Type: IMPLANTABLE DEVICE | Site: GROIN | Status: FUNCTIONAL

## 2022-08-10 RX ORDER — FENTANYL CITRATE 50 UG/ML
INJECTION, SOLUTION INTRAMUSCULAR; INTRAVENOUS AS NEEDED
Status: DISCONTINUED | OUTPATIENT
Start: 2022-08-10 | End: 2022-08-10 | Stop reason: SURG

## 2022-08-10 RX ORDER — BUPIVACAINE HYDROCHLORIDE 5 MG/ML
INJECTION, SOLUTION PERINEURAL
Status: DISCONTINUED | OUTPATIENT
Start: 2022-08-10 | End: 2022-08-10 | Stop reason: HOSPADM

## 2022-08-10 RX ORDER — ACETAMINOPHEN 325 MG/1
650 TABLET ORAL ONCE
Status: COMPLETED | OUTPATIENT
Start: 2022-08-10 | End: 2022-08-10

## 2022-08-10 RX ORDER — TRAMADOL HYDROCHLORIDE 50 MG/1
50 TABLET ORAL ONCE
Status: DISCONTINUED | OUTPATIENT
Start: 2022-08-10 | End: 2022-08-10 | Stop reason: HOSPADM

## 2022-08-10 RX ORDER — EPHEDRINE SULFATE 50 MG/ML
INJECTION, SOLUTION INTRAVENOUS AS NEEDED
Status: DISCONTINUED | OUTPATIENT
Start: 2022-08-10 | End: 2022-08-10 | Stop reason: SURG

## 2022-08-10 RX ORDER — KETOROLAC TROMETHAMINE 30 MG/ML
30 INJECTION, SOLUTION INTRAMUSCULAR; INTRAVENOUS ONCE
Status: COMPLETED | OUTPATIENT
Start: 2022-08-10 | End: 2022-08-10

## 2022-08-10 RX ORDER — TRAMADOL HYDROCHLORIDE 50 MG/1
50 TABLET ORAL EVERY 6 HOURS PRN
Qty: 10 TABLET | Refills: 0 | Status: SHIPPED | OUTPATIENT
Start: 2022-08-10 | End: 2023-09-12

## 2022-08-10 RX ORDER — ROCURONIUM BROMIDE 10 MG/ML
INJECTION, SOLUTION INTRAVENOUS AS NEEDED
Status: DISCONTINUED | OUTPATIENT
Start: 2022-08-10 | End: 2022-08-10 | Stop reason: SURG

## 2022-08-10 RX ORDER — ONDANSETRON HYDROCHLORIDE 2 MG/ML
4 INJECTION, SOLUTION INTRAVENOUS
Status: DISCONTINUED | OUTPATIENT
Start: 2022-08-10 | End: 2022-08-10 | Stop reason: HOSPADM

## 2022-08-10 RX ORDER — FENTANYL CITRATE 50 UG/ML
50 INJECTION, SOLUTION INTRAMUSCULAR; INTRAVENOUS
Status: DISCONTINUED | OUTPATIENT
Start: 2022-08-10 | End: 2022-08-10 | Stop reason: HOSPADM

## 2022-08-10 RX ORDER — SODIUM CHLORIDE 9 MG/ML
INJECTION, SOLUTION INTRAVENOUS CONTINUOUS PRN
Status: DISCONTINUED | OUTPATIENT
Start: 2022-08-10 | End: 2022-08-10 | Stop reason: SURG

## 2022-08-10 RX ORDER — LIDOCAINE HYDROCHLORIDE 10 MG/ML
INJECTION, SOLUTION INFILTRATION; PERINEURAL AS NEEDED
Status: DISCONTINUED | OUTPATIENT
Start: 2022-08-10 | End: 2022-08-10 | Stop reason: SURG

## 2022-08-10 RX ORDER — HYDROMORPHONE HYDROCHLORIDE 1 MG/ML
0.5 INJECTION, SOLUTION INTRAMUSCULAR; INTRAVENOUS; SUBCUTANEOUS
Status: DISCONTINUED | OUTPATIENT
Start: 2022-08-10 | End: 2022-08-10 | Stop reason: HOSPADM

## 2022-08-10 RX ORDER — CEFAZOLIN SODIUM 2 G/100ML
INJECTION, SOLUTION INTRAVENOUS AS NEEDED
Status: DISCONTINUED | OUTPATIENT
Start: 2022-08-10 | End: 2022-08-10 | Stop reason: SURG

## 2022-08-10 RX ORDER — PROPOFOL 10 MG/ML
INJECTION, EMULSION INTRAVENOUS AS NEEDED
Status: DISCONTINUED | OUTPATIENT
Start: 2022-08-10 | End: 2022-08-10 | Stop reason: SURG

## 2022-08-10 RX ADMIN — FENTANYL CITRATE 100 MCG: 50 INJECTION, SOLUTION INTRAMUSCULAR; INTRAVENOUS at 11:52

## 2022-08-10 RX ADMIN — ACETAMINOPHEN 650 MG: 325 TABLET ORAL at 15:26

## 2022-08-10 RX ADMIN — CEFAZOLIN SODIUM 2 G: 2 INJECTION, SOLUTION INTRAVENOUS at 11:44

## 2022-08-10 RX ADMIN — LIDOCAINE HYDROCHLORIDE 5 ML: 10 INJECTION, SOLUTION INFILTRATION; PERINEURAL at 11:52

## 2022-08-10 RX ADMIN — PROPOFOL 40 MG: 10 INJECTION, EMULSION INTRAVENOUS at 11:56

## 2022-08-10 RX ADMIN — SODIUM CHLORIDE: 9 INJECTION, SOLUTION INTRAVENOUS at 11:46

## 2022-08-10 RX ADMIN — EPHEDRINE SULFATE 5 MG: 50 INJECTION, SOLUTION INTRAVENOUS at 12:09

## 2022-08-10 RX ADMIN — PROPOFOL 50 MG: 10 INJECTION, EMULSION INTRAVENOUS at 11:52

## 2022-08-10 RX ADMIN — ROCURONIUM BROMIDE 50 MG: 10 INJECTION, SOLUTION INTRAVENOUS at 11:55

## 2022-08-10 RX ADMIN — SUGAMMADEX 200 MG: 100 INJECTION, SOLUTION INTRAVENOUS at 13:16

## 2022-08-10 RX ADMIN — KETOROLAC TROMETHAMINE 30 MG: 30 INJECTION, SOLUTION INTRAMUSCULAR; INTRAVENOUS at 14:49

## 2022-08-10 RX ADMIN — PROPOFOL 30 MG: 10 INJECTION, EMULSION INTRAVENOUS at 11:54

## 2022-08-10 RX ADMIN — PROPOFOL 50 MG: 10 INJECTION, EMULSION INTRAVENOUS at 11:53

## 2022-08-10 NOTE — DISCHARGE INSTRUCTIONS
Dr. Bragg’s Post-Operative Instructions  Laparoscopic inguinal hernia repair    Leave surgical glue in place on the incisions, and they will peel off by themselves. Don't pick the glue off.     You may shower starting the day after surgery.      Resume your normal diet as tolerated.    Bruising and mild swelling at the incisions are normal. There may be a small, firm lump under each incision. Men may also experience bruising and swelling in the scrotum and penis. Women may experience some bruising and swelling in the vulva. This is all normal and will disappear within a few weeks.    You are likely to have some constipation for the first few days after surgery, due to the anesthesia and pain medication. Straining to move your bowels can be painful at the surgical site.  To make it easier, you may drink 4-6 oz of prune juice each morning. If there is still no bowel movement after the first 4 days, take a laxative such as Dulcolax or Milk of Magnesia in the morning. If there is still no bowel movement by the following morning after breakfast, please call the office.    You may resume light activity that is comfortable, including walking, climbing stairs, and going outdoors.  Don’t do anything more strenuous until you return for your follow-up visit. Sexual activity may be resumed cautiously whenever comfortable. You may ride in a car but do not drive until you are completely comfortable doing so and no longer taking narcotic pain medication.    Take Tylenol every 6 hours for pain control. This will provide low-level pain relief. Take the prescribed narcotic for breakthrough pain that is not controlled with Tylenol alone.      You may resume your usual medications, including aspirin, the day after surgery. If you are on a blood thinner, discuss with Dr. Bragg the timing to re-start it.    Call the office for a postoperative appointment 10-14 days after your surgery.     Call the office to report any of the following  symptoms:  Fever greater than 101.  Nausea and vomiting.  Increasing redness or tenderness, or drainage from your incisions.  Difficulty urinating.    Please call the office at [phone] with any questions or concerns.  You may speak with the nurse (Manda Lewis RN) or leave a message for Dr. Bragg.

## 2022-08-10 NOTE — ANESTHESIA PROCEDURE NOTES
Airway  Urgency: elective    Start Time: 8/10/2022 11:56 AM    General Information and Staff    Patient location during procedure: OR  Anesthesiologist: Tim Arroyo MD  Resident/CRNA: Sarah Hansen CRNA  Performed: resident/CRNA     Indications and Patient Condition  Indications for airway management: anesthesia  Sedation level: deep  Preoxygenated: yes  Patient position: sniffing  Mask difficulty assessment: 1 - vent by mask    Final Airway Details  Final airway type: endotracheal airway      Successful airway: ETT     Successful intubation technique: direct laryngoscopy  Facilitating devices/methods: intubating stylet  Endotracheal tube insertion site: oral  Blade: Mary  Blade size: #3  ETT size (mm): 7.0  Cormack-Lehane Classification: grade IIb - view of arytenoids or posterior of glottis only  Placement verified by: chest auscultation and capnometry   Measured from: lips  ETT to lips (cm): 23  Number of attempts at approach: 1  Number of other approaches attempted: 0  Atraumatic airway insertion

## 2022-08-10 NOTE — OP NOTE
Laparoscopic right femoral and inguinal hernia (recurrent) repair with mesh (R) Procedure Note     Procedure date: 8/10/2022    Procedure:    Laparoscopic right femoral and inguinal  hernia (recurrent) repair with mesh  CPT(R) Code:  54253 - OR LAP,INGUINAL HERNIA REPR,RECUR      Pre-op Diagnosis     * Unilateral recurrent femoral hernia without obstruction or gangrene [K41.91]    Post-op Diagnosis     * Unilateral recurrent femoral hernia without obstruction or gangrene [K41.91]    Surgeon(s) and Role:     * James Bragg MD - Primary     * Raeann Damian MD - Resident - Assisting    Anesthesia: General    Staff:   Circulator: Alexia Evans RN; Tanisha Rincon RN  Scrub Person: Deyanira Whittington RN    Findings:  Moderate sized indirect hernia containing peritoneum and preperitoneal fat.  Small recurrent femoral hernia containing fatty tissue.    Procedure Details   A small transverse incision was made on the right side of the umbilicus.  The anterior rectus fascia was incised vertically.  The preperitoneal space was accessed with the balloon dissector and insufflated to 12 mmHg.  Two 5 mm ports were placed in the left lower quadrant.  The right preperitoneal space was dissected.  The hernias were fully reduced.  The round ligament was divided with electrocautery to facilitate mobilization of the peritoneum away from the inguinal canal.  The hernias were repaired with a right anatomical laparoscopic ProGrip mesh.  It fit snugly with wide overlap in all directions.  Hemostasis was excellent.  After final inspection, the ports were removed under laparoscopic vision.  The umbilical fascia was closed with a figure-of-eight 0 Vicryl suture.  Skin was closed with 4-0 Monocryl subcuticular suture.    Mesh size:  10cm x 15cm    Estimated Blood Loss: Minimal.    Specimens:                No specimens collected during this procedure.      Drains: * No LDAs found *    Implants:   Implant Name Type Inv. Item  Serial No.  Lot No. LRB No. Used Action   LAPROSCOPIC PROGRIP RIGHT MESH 10X15 - [id number] - [id number] Mesh Surgical LAPROSCOPIC PROGRIP RIGHT MESH 10x15 [id number] [facility] [id number] Right 1 Implanted                    Disposition: PACU - hemodynamically stable.           James Bragg MD  Phone Number: [phone]

## 2022-08-10 NOTE — ANESTHESIA POSTPROCEDURE EVALUATION
Patient: Katheryn Goncalves    Procedure Summary     Date: 08/10/22 Room / Location: [facility]    Anesthesia Start: 1146 Anesthesia Stop: 1342    Procedure: Laparoscopic right femoral and inguinal  hernia (recurrent) repair with mesh (Right Groin) Diagnosis:       Unilateral recurrent femoral hernia without obstruction or gangrene      (Unilateral recurrent femoral hernia without obstruction or gangrene [K41.91])    Surgeons: James Bragg MD Responsible Provider: Tim Arroyo MD    Anesthesia Type: general ASA Status: 2          Anesthesia Type: general  PACU Vitals  8/10/2022 1336 - 8/10/2022 1436      8/10/2022  1339 8/10/2022  1353 8/10/2022  1400 8/10/2022  1415    BP: 118/62 -- 138/60 143/62    Temp: -- 36.3 °C (97.3 °F) -- --    Pulse: 56 -- 47 49    Resp: 13 -- 15 19    SpO2: 97 % -- 100 % 100 %              8/10/2022  1430             BP: 135/65       Temp: --       Pulse: 46       Resp: 15       SpO2: 99 %               Anesthesia Post Evaluation    Pain management: satisfactory to patient  Patient location during evaluation: PACU  Patient participation: complete - patient participated  Level of consciousness: awake and alert  Cardiovascular status: acceptable  Airway Patency: adequate and patent  Respiratory status: nonlabored ventilation and spontaneous ventilation  Anesthetic complications: no

## 2022-08-10 NOTE — ANESTHESIOLOGIST PRE-PROCEDURE ATTESTATION
Pre-Procedure Patient Identification:  I am the Primary Anesthesiologist and have identified the patient on 08/10/22 at 11:18 AM.   I have confirmed the procedure(s) will be performed by the following surgeon/proceduralist James Bragg MD.

## 2022-08-10 NOTE — OR SURGEON
Pre-Procedure patient identification:  I am the primary operating surgeon/proceduralist and I have identified the patient and confirmed laterality is right on 08/10/22 at 11:11 AM James Bragg MD  Phone Number: [phone]

## 2022-08-10 NOTE — PERIOPERATIVE NURSING NOTE
Pt. D/c'd from Stage 2 to home as per Dr. Bragg's orders. VSS. Pt. Voided prior to d/c. Lap sites x3 are CDI and PIPPA. Pt. Was given Tylenol prior to d/c for 5/10 abdominal pain.

## 2022-08-23 ENCOUNTER — OFFICE VISIT (OUTPATIENT)
Dept: SURGERY | Facility: CLINIC | Age: 77
End: 2022-08-23
Payer: MEDICARE

## 2022-08-23 DIAGNOSIS — K40.90 NON-RECURRENT INGUINAL HERNIA OF RIGHT SIDE WITHOUT OBSTRUCTION OR GANGRENE: Primary | ICD-10-CM

## 2022-08-23 DIAGNOSIS — K41.91 UNILATERAL RECURRENT FEMORAL HERNIA WITHOUT OBSTRUCTION OR GANGRENE: ICD-10-CM

## 2022-08-23 PROCEDURE — 99024 POSTOP FOLLOW-UP VISIT: CPT | Performed by: SURGERY

## 2022-08-23 NOTE — PROGRESS NOTES
Status post laparoscopic repair of a right inguinal and recurrent right femoral femoral hernia with mesh on 8/10/2022.    On follow-up today, she reports that the surgical pain has mostly resolved.  She is eating well, urinating well, and having normal bowel movements.  On exam, her abdomen is soft and nontender.  The incisions are healing very well.  There is no significant swelling.  The hernia repair is intact.    Follow-up in 6 weeks if needed.

## 2022-08-23 NOTE — LETTER
August 23, 2022     Sugar Means MD  [address] [facility] [address]    Patient: Katheryn Goncalves  YOB: 1945  Date of Visit: 8/23/2022      Dear Dr. Eva Means:    Katheryn Goncalves returned for a postop visit today.  Below are my progress notes.    If you have questions, please do not hesitate to call me.  My mobile phone number is [phone], and my office number is [phone].  Thank you again for the opportunity to care for this patient.      Sincerely,        James Bragg MD        CC: No James Dave MD  8/23/2022 11:43 AM  Signed  Status post laparoscopic repair of a right inguinal and recurrent right femoral femoral hernia with mesh on 8/10/2022.    On follow-up today, she reports that the surgical pain has mostly resolved.  She is eating well, urinating well, and having normal bowel movements.  On exam, her abdomen is soft and nontender.  The incisions are healing very well.  There is no significant swelling.  The hernia repair is intact.    Follow-up in 6 weeks if needed.

## 2022-09-28 ENCOUNTER — OFFICE VISIT (OUTPATIENT)
Dept: INTERNAL MEDICINE | Facility: CLINIC | Age: 77
End: 2022-09-28
Payer: MEDICARE

## 2022-09-28 VITALS
WEIGHT: 144 LBS | BODY MASS INDEX: 23.99 KG/M2 | HEART RATE: 52 BPM | DIASTOLIC BLOOD PRESSURE: 60 MMHG | OXYGEN SATURATION: 97 % | TEMPERATURE: 98.4 F | HEIGHT: 65 IN | SYSTOLIC BLOOD PRESSURE: 114 MMHG

## 2022-09-28 VITALS
WEIGHT: 144 LBS | DIASTOLIC BLOOD PRESSURE: 60 MMHG | TEMPERATURE: 98.4 F | HEIGHT: 65 IN | BODY MASS INDEX: 23.99 KG/M2 | HEART RATE: 52 BPM | OXYGEN SATURATION: 97 % | SYSTOLIC BLOOD PRESSURE: 114 MMHG

## 2022-09-28 DIAGNOSIS — K40.90 RIGHT INGUINAL HERNIA: ICD-10-CM

## 2022-09-28 DIAGNOSIS — R68.89 HEAT INTOLERANCE: ICD-10-CM

## 2022-09-28 DIAGNOSIS — Z00.00 MEDICARE ANNUAL WELLNESS VISIT, SUBSEQUENT: Primary | ICD-10-CM

## 2022-09-28 DIAGNOSIS — N30.00 ACUTE CYSTITIS WITHOUT HEMATURIA: ICD-10-CM

## 2022-09-28 DIAGNOSIS — R42 VERTIGO: ICD-10-CM

## 2022-09-28 DIAGNOSIS — R35.1 NOCTURIA: Primary | ICD-10-CM

## 2022-09-28 DIAGNOSIS — K41.91: ICD-10-CM

## 2022-09-28 DIAGNOSIS — E03.9 HYPOTHYROIDISM, UNSPECIFIED TYPE: ICD-10-CM

## 2022-09-28 DIAGNOSIS — Z12.11 SCREENING FOR COLON CANCER: ICD-10-CM

## 2022-09-28 DIAGNOSIS — R61 NIGHT SWEATS: ICD-10-CM

## 2022-09-28 DIAGNOSIS — Z23 NEED FOR INFLUENZA VACCINATION: ICD-10-CM

## 2022-09-28 DIAGNOSIS — E78.5 HYPERLIPIDEMIA, UNSPECIFIED HYPERLIPIDEMIA TYPE: ICD-10-CM

## 2022-09-28 DIAGNOSIS — E53.8 VITAMIN B12 DEFICIENCY: ICD-10-CM

## 2022-09-28 PROCEDURE — G0439 PPPS, SUBSEQ VISIT: HCPCS | Performed by: INTERNAL MEDICINE

## 2022-09-28 PROCEDURE — G0008 ADMIN INFLUENZA VIRUS VAC: HCPCS | Performed by: INTERNAL MEDICINE

## 2022-09-28 PROCEDURE — 99213 OFFICE O/P EST LOW 20 MIN: CPT | Mod: 25 | Performed by: INTERNAL MEDICINE

## 2022-09-28 PROCEDURE — 90694 VACC AIIV4 NO PRSRV 0.5ML IM: CPT | Performed by: INTERNAL MEDICINE

## 2022-09-28 RX ORDER — OXYBUTYNIN CHLORIDE 5 MG/1
5 TABLET ORAL NIGHTLY PRN
Qty: 30 TABLET | Refills: 1 | Status: SHIPPED | OUTPATIENT
Start: 2022-09-28 | End: 2023-07-27 | Stop reason: ALTCHOICE

## 2022-09-28 ASSESSMENT — ENCOUNTER SYMPTOMS
DYSURIA: 0
DIARRHEA: 0
CHEST TIGHTNESS: 0
UNEXPECTED WEIGHT CHANGE: 0
SHORTNESS OF BREATH: 0
ABDOMINAL PAIN: 0
DIAPHORESIS: 1
PALPITATIONS: 0
FREQUENCY: 1
CONSTIPATION: 0
LIGHT-HEADEDNESS: 0

## 2022-09-28 ASSESSMENT — PATIENT HEALTH QUESTIONNAIRE - PHQ9: SUM OF ALL RESPONSES TO PHQ9 QUESTIONS 1 & 2: 0

## 2022-09-28 NOTE — PROGRESS NOTES
Subjective      Patient ID: Katheryn Goncalves is a 76 y.o. female.    HPI She is here for follow up - had her scopic surgery on August 10 with DR. Bragg ( right femoral and inguinal hernia repair with mesh). Also went for vestibular therapy for BPPV- some improvement-has not had a recurrence recently.  Denies any tinnitus or hearing loss.  Also- Had a recent UTI September 7 and will be following up with Dr. Raquel Faust her urogynecologist.  Currently has no dysuria or urgency.  She does have frequency nocturia approximately 4 times per night which interrupts her sleep significantly.- Finally,complains that she perspires frequently - with minimal exertion - no associated chest pain, palpitations or shortness of breath.    The following have been reviewed and updated as appropriate in this visit:   Allergies  Meds  Problems         Past Medical History:   Diagnosis Date    Marshall esophagus     High cholesterol     Hypothyroidism     Osteoporosis      Past Surgical History:   Procedure Laterality Date    BLADDER SUSPENSION      CHOLECYSTECTOMY      FEMORAL HERNIA REPAIR Right     bowel dusky but OK - no resection    TOTAL VAGINAL HYSTERECTOMY  1997     Family History   Problem Relation Age of Onset    Colon cancer Biological Mother     Heart disease Biological Father     Atrial fibrillation Biological Brother     Cancer Biological Brother      Social History     Socioeconomic History    Marital status:      Spouse name: Not on file    Number of children: Not on file    Years of education: Not on file    Highest education level: Not on file   Occupational History    Not on file   Tobacco Use    Smoking status: Never Smoker    Smokeless tobacco: Never Used   Vaping Use    Vaping Use: Never used   Substance and Sexual Activity    Alcohol use: No    Drug use: No    Sexual activity: Never   Other Topics Concern    Not on file   Social History Narrative    Not on file     Social Determinants  of Health     Financial Resource Strain: Not on file   Food Insecurity: Not on file   Transportation Needs: Not on file   Physical Activity: Not on file   Stress: Not on file   Social Connections: Not on file   Intimate Partner Violence: Not on file   Housing Stability: Not on file       Review of Systems   Constitutional: Positive for diaphoresis. Negative for unexpected weight change.   Respiratory: Negative for chest tightness and shortness of breath.    Cardiovascular: Negative for chest pain and palpitations.   Gastrointestinal: Negative for abdominal pain, constipation and diarrhea.   Endocrine: Positive for heat intolerance. Negative for cold intolerance.   Genitourinary: Positive for frequency. Negative for dysuria and urgency.   Neurological: Negative for light-headedness.       No Known Allergies  Current Outpatient Medications   Medication Sig Dispense Refill    oxybutynin (DITROPAN) 5 mg tablet Take 1 tablet (5 mg total) by mouth nightly as needed (urinary frrequency). 30 tablet 1    acetaminophen (TYLENOL) 500 mg tablet Take 500 mg by mouth every 6 (six) hours as needed for mild pain (Takes PRN only).      atorvastatin (LIPITOR) 20 mg tablet TAKE 1 TABLET BY MOUTH EVERY DAY (Patient taking differently: Take 20 mg by mouth every evening.) 90 tablet 3    denosumab (PROLIA) 60 mg/mL syringe Inject 60 mg under the skin once. Injection 2 x yearly      esomeprazole (NexIUM) 40 mg capsule Take 1 capsule (40 mg total) by mouth daily. 90 capsule 0    estradioL (ESTRACE) 0.01 % (0.1 mg/gram) vaginal cream Insert 2 g into the vagina 2 (two) times a week (Mon, Thu).      levothyroxine 88 mcg tablet TAKE 1 TABLET BY MOUTH EVERY DAY (Patient taking differently: Take 88 mcg by mouth daily.) 90 tablet 3    traMADoL (ULTRAM) 50 mg tablet Take 1 tablet (50 mg total) by mouth every 6 (six) hours as needed for moderate pain or severe pain (For post-op pain. Do not drive while taking). 10 tablet 0     No current  "facility-administered medications for this visit.       Objective   Vitals:    09/28/22 1119   BP: 114/60   Pulse: (!) 52   Temp: 36.9 °C (98.4 °F)   SpO2: 97%   Weight: 65.3 kg (144 lb)   Height: 1.651 m (5' 5\")       Physical Exam  Vitals and nursing note reviewed.   Constitutional:       Appearance: She is well-developed.   HENT:      Head: Normocephalic and atraumatic.   Neck:      Thyroid: No thyromegaly.      Vascular: No carotid bruit.   Cardiovascular:      Rate and Rhythm: Normal rate and regular rhythm.      Pulses: Normal pulses.      Heart sounds: Normal heart sounds. No murmur heard.  Pulmonary:      Effort: Pulmonary effort is normal.      Breath sounds: Normal breath sounds.   Abdominal:      General: Bowel sounds are normal.      Palpations: Abdomen is soft. There is no mass.      Tenderness: There is no abdominal tenderness.   Musculoskeletal:      Cervical back: Normal range of motion and neck supple.   Skin:     General: Skin is warm and dry.   Neurological:      Mental Status: She is alert and oriented to person, place, and time.   Psychiatric:         Behavior: Behavior normal.         Thought Content: Thought content normal.         Judgment: Judgment normal.         Assessment/Plan   Problem List Items Addressed This Visit     Hypothyroidism     Continue levothyroxine.  We will check TSH           Hyperlipidemia     But profile at goal on her last blood work will follow every 6 months           Vitamin B12 deficiency     On replacement.  We will check a follow-up level           Vertigo     Likely positional vertigo.  Did very improve with some vestibular therapy.  She knows how to do the exercises if necessary.  No hearing loss or tinnitus to suggest Ménière's.  Previous MRI with attention to the internal auditory canals was negative           Recurrent right femoral hernia     Well status post surgery August 10           Nocturia - Primary    Right inguinal hernia     She has done well " following her laparoscopic repair with Dr. Christiansen in August           Acute cystitis without hematuria     Resolved following treatment.  We will check a follow-up urinalysis and culture.  Previous culture was contaminated.  She will follow with urogynecology Dr. Raquel Faust           Relevant Medications    oxybutynin (DITROPAN) 5 mg tablet    Other Relevant Orders    Urinalysis with Reflex Culture (ED and Outpatient only)    Heat intolerance     Possibly related to her thyroid function.  Last TSH was on the lower end although still normal.  We will check a repeat may need to adjust her thyroid hormone dose.  No symptoms suggestive of cardiac ischemia             Other Visit Diagnoses     Night sweats        Relevant Orders    Comprehensive metabolic panel    TSH 3rd Generation    Urinalysis with Reflex Culture (ED and Outpatient only)    CBC    Screening for colon cancer        Relevant Orders    FIT Medicare          Sugar Means MD    9/28/2022

## 2022-09-28 NOTE — ASSESSMENT & PLAN NOTE
Possibly related to her thyroid function.  Last TSH was on the lower end although still normal.  We will check a repeat may need to adjust her thyroid hormone dose.  No symptoms suggestive of cardiac ischemia

## 2022-09-28 NOTE — ASSESSMENT & PLAN NOTE
Likely positional vertigo.  Did very improve with some vestibular therapy.  She knows how to do the exercises if necessary.  No hearing loss or tinnitus to suggest Ménière's.  Previous MRI with attention to the internal auditory canals was negative

## 2022-09-28 NOTE — PATIENT INSTRUCTIONS
Consider uquora   do a FIT test   follow up blood work for B12 , thyroid , CBC   recheck urinalysis   LVM informing patient's daughter, Lydia, [facility].  Fax: [phone].    Princess LETTY Suh CMA

## 2022-09-28 NOTE — ASSESSMENT & PLAN NOTE
Annual wellness visit performed. All relevant screenings and immunizations discussed, medications reviewed and reconciled. No safety issues. Cognitive and depression screen performed.End of life planning discussed. Other providers reviewed and listed.  Advised shingrix at her convenience  Flu vaccine today  Had bivalent covid booster last week   Given FIT test

## 2022-09-28 NOTE — PROGRESS NOTES
Subjective     Katheryn Goncalves is a 76 y.o. female who presents for a subsequent annual wellness visit.     Patient Care Team:  Sugar Mathews MD as PCP - General  Rl Roland MD as Urogynecologist (Urogynecology)  Raquel Moore MD as Referring Physician  Mily Ortiz MD as Referring Physician  Mily Ortiz MD as Referring Physician  James Bragg MD as Surgeon (General Surgery)    Comprehensive Medical and Social History  Patient Active Problem List   Diagnosis    Irreducible right femoral hernia    Anemia    Barretts esophagus    Esophageal reflux    Hypothyroidism    Vaginal vault prolapse, posthysterectomy    Pre-operative clearance    Abnormal EKG    Hyperlipidemia    Medicare annual wellness visit, subsequent    Calcification of right breast    History of sacrocolpopexy    Age-related osteoporosis without current pathological fracture    COVID-19    Recurrent UTI    Vitamin B12 deficiency    Borderline glaucoma    Nuclear senile cataract    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    Vaginal prolapse    Abdominal bloating    Rib pain on left side    Dyslipidemia    Tinnitus of right ear    Vertigo    Left hip pain    Recurrent right femoral hernia    Upper respiratory tract infection    Nocturia    Lightheadedness    Imbalance    Right inguinal hernia    Proteins serum plasma low    Acute cystitis without hematuria    Heat intolerance     Past Medical History:   Diagnosis Date    Marshall esophagus     High cholesterol     Hypothyroidism     Osteoporosis      Past Surgical History:   Procedure Laterality Date    BLADDER SUSPENSION      CHOLECYSTECTOMY      FEMORAL HERNIA REPAIR Right     bowel dusky but OK - no resection    TOTAL VAGINAL HYSTERECTOMY  1997     No Known Allergies  Current Outpatient Medications   Medication Sig Dispense Refill    acetaminophen (TYLENOL) 500 mg tablet Take 500 mg by mouth every 6 (six) hours as needed for  "mild pain (Takes PRN only).      atorvastatin (LIPITOR) 20 mg tablet TAKE 1 TABLET BY MOUTH EVERY DAY (Patient taking differently: Take 20 mg by mouth every evening.) 90 tablet 3    denosumab (PROLIA) 60 mg/mL syringe Inject 60 mg under the skin once. Injection 2 x yearly      esomeprazole (NexIUM) 40 mg capsule Take 1 capsule (40 mg total) by mouth daily. 90 capsule 0    estradioL (ESTRACE) 0.01 % (0.1 mg/gram) vaginal cream Insert 2 g into the vagina 2 (two) times a week (Mon, Thu).      levothyroxine 88 mcg tablet TAKE 1 TABLET BY MOUTH EVERY DAY (Patient taking differently: Take 88 mcg by mouth daily.) 90 tablet 3    oxybutynin (DITROPAN) 5 mg tablet Take 1 tablet (5 mg total) by mouth nightly as needed (urinary frrequency). 30 tablet 1    traMADoL (ULTRAM) 50 mg tablet Take 1 tablet (50 mg total) by mouth every 6 (six) hours as needed for moderate pain or severe pain (For post-op pain. Do not drive while taking). 10 tablet 0     No current facility-administered medications for this visit.     Social History     Tobacco Use    Smoking status: Never Smoker    Smokeless tobacco: Never Used   Vaping Use    Vaping Use: Never used   Substance Use Topics    Alcohol use: No    Drug use: No     Family History   Problem Relation Age of Onset    Colon cancer Biological Mother     Heart disease Biological Father     Atrial fibrillation Biological Brother     Cancer Biological Brother        Objective   Vitals  Vitals:    09/28/22 1106   BP: 114/60   Pulse: (!) 52   Temp: 36.9 °C (98.4 °F)   SpO2: 97%   Weight: 65.3 kg (144 lb)   Height: 1.651 m (5' 5\")     Body mass index is 23.96 kg/m².    Advanced Care Plan                                        PHQ  Will the patient answer the depression questions?: Yes   Little interest or pleasure in doing things: Not at all   Feeling down, depressed, or hopeless: Not at all   Depression Risk: 0                                             Mini Cog         Get Up and " Go  Result: Pass    STEADI Falls Risk  One or more falls in the last year: No           Has trouble stepping up onto a curb: No   Advised to use a cane or walker to get around safely: No   Often has to rush to the toilet: No   Feels unsteady when walking: Yes   Has lost some feeling in feet: Yes   Often feels sad or depressed: No   Steadies self on furniture while walking at home: No   Takes medication that makes him/her feel lightheaded or more tired than usual: No   Worried about falling: No   Takes medicine to sleep or improve mood: No   Needs to push with hands when rising from a chair: No   Falls screen completed: Yes     Hearing and Vision Screening  No exam data present  See HRA for relevant hearing screening response.    Assessment/Plan   Diagnoses and all orders for this visit:    Medicare annual wellness visit, subsequent (Primary)  Assessment & Plan:  Annual wellness visit performed. All relevant screenings and immunizations discussed, medications reviewed and reconciled. No safety issues. Cognitive and depression screen performed.End of life planning discussed. Other providers reviewed and listed.  Advised shingrix at her convenience  Flu vaccine today  Had bivalent covid booster last week   Given FIT test        See Patient Instructions (the written plan) which was given to the patient for PPPS and health risk factors with interventions.

## 2022-09-28 NOTE — ASSESSMENT & PLAN NOTE
Resolved following treatment.  We will check a follow-up urinalysis and culture.  Previous culture was contaminated.  She will follow with urogynecology Dr. Raquel Faust

## 2022-10-11 ENCOUNTER — TELEPHONE (OUTPATIENT)
Dept: INTERNAL MEDICINE | Facility: CLINIC | Age: 77
End: 2022-10-11
Payer: MEDICARE

## 2022-10-11 ENCOUNTER — APPOINTMENT (OUTPATIENT)
Dept: LAB | Facility: CLINIC | Age: 77
End: 2022-10-11
Attending: INTERNAL MEDICINE
Payer: MEDICARE

## 2022-10-11 DIAGNOSIS — N30.00 ACUTE CYSTITIS WITHOUT HEMATURIA: ICD-10-CM

## 2022-10-11 DIAGNOSIS — Z12.11 SCREENING FOR COLON CANCER: ICD-10-CM

## 2022-10-11 DIAGNOSIS — R61 NIGHT SWEATS: ICD-10-CM

## 2022-10-11 DIAGNOSIS — E53.8 VITAMIN B12 DEFICIENCY: ICD-10-CM

## 2022-10-11 DIAGNOSIS — E53.8 VITAMIN B12 DEFICIENCY: Primary | ICD-10-CM

## 2022-10-11 LAB
ALBUMIN SERPL-MCNC: 3.9 G/DL (ref 3.4–5)
ALP SERPL-CCNC: 63 IU/L (ref 35–126)
ALT SERPL-CCNC: 19 IU/L (ref 11–54)
ANION GAP SERPL CALC-SCNC: 14 MEQ/L (ref 3–15)
AST SERPL-CCNC: 23 IU/L (ref 15–41)
BILIRUB SERPL-MCNC: 0.7 MG/DL (ref 0.3–1.2)
BILIRUB UR QL STRIP.AUTO: NEGATIVE MG/DL
BUN SERPL-MCNC: 9 MG/DL (ref 8–20)
CALCIUM SERPL-MCNC: 8.8 MG/DL (ref 8.9–10.3)
CHLORIDE SERPL-SCNC: 95 MEQ/L (ref 98–109)
CLARITY UR REFRACT.AUTO: CLEAR
CO2 SERPL-SCNC: 25 MEQ/L (ref 22–32)
COLOR UR AUTO: YELLOW
CREAT SERPL-MCNC: 0.7 MG/DL (ref 0.6–1.1)
ERYTHROCYTE [DISTWIDTH] IN BLOOD BY AUTOMATED COUNT: 11.9 % (ref 11.7–14.4)
GFR SERPL CREATININE-BSD FRML MDRD: >60 ML/MIN/1.73M*2
GLUCOSE SERPL-MCNC: 78 MG/DL (ref 70–99)
GLUCOSE UR STRIP.AUTO-MCNC: NEGATIVE MG/DL
HCT VFR BLDCO AUTO: 38.6 % (ref 35–45)
HGB BLD-MCNC: 13.2 G/DL (ref 11.8–15.7)
HGB UR QL STRIP.AUTO: NEGATIVE
KETONES UR STRIP.AUTO-MCNC: NEGATIVE MG/DL
LEUKOCYTE ESTERASE UR QL STRIP.AUTO: NEGATIVE
MCH RBC QN AUTO: 33 PG (ref 28–33.2)
MCHC RBC AUTO-ENTMCNC: 34.2 G/DL (ref 32.2–35.5)
MCV RBC AUTO: 96.5 FL (ref 83–98)
NITRITE UR QL STRIP.AUTO: NEGATIVE
PDW BLD AUTO: 9.7 FL (ref 9.4–12.3)
PH UR STRIP.AUTO: 6.5 [PH]
PLATELET # BLD AUTO: 209 K/UL (ref 150–369)
POTASSIUM SERPL-SCNC: 3.7 MEQ/L (ref 3.6–5.1)
PROT SERPL-MCNC: 6 G/DL (ref 6–8.2)
PROT UR QL STRIP.AUTO: NEGATIVE
RBC # BLD AUTO: 4 M/UL (ref 3.93–5.22)
SODIUM SERPL-SCNC: 134 MEQ/L (ref 136–144)
SP GR UR REFRACT.AUTO: 1.02
TSH SERPL DL<=0.05 MIU/L-ACNC: 1.19 MIU/L (ref 0.34–5.6)
UROBILINOGEN UR STRIP-ACNC: 0.2 EU/DL
VIT B12 SERPL-MCNC: 742 PG/ML (ref 180–914)
WBC # BLD AUTO: 6.84 K/UL (ref 3.8–10.5)

## 2022-10-11 PROCEDURE — 82607 VITAMIN B-12: CPT

## 2022-10-11 PROCEDURE — 36415 COLL VENOUS BLD VENIPUNCTURE: CPT

## 2022-10-11 PROCEDURE — 81003 URINALYSIS AUTO W/O SCOPE: CPT

## 2022-10-11 PROCEDURE — 80053 COMPREHEN METABOLIC PANEL: CPT

## 2022-10-11 PROCEDURE — 85027 COMPLETE CBC AUTOMATED: CPT

## 2022-10-11 PROCEDURE — 84443 ASSAY THYROID STIM HORMONE: CPT

## 2022-10-11 NOTE — TELEPHONE ENCOUNTER
Patient is requesting to add B-12 to her labs. She forgot to ask Dr. Velasquez when she was in. She will be going to [facility].

## 2022-10-18 ENCOUNTER — TELEPHONE (OUTPATIENT)
Dept: INTERNAL MEDICINE | Facility: CLINIC | Age: 77
End: 2022-10-18
Payer: MEDICARE

## 2022-10-18 DIAGNOSIS — E78.5 HYPERLIPIDEMIA, UNSPECIFIED HYPERLIPIDEMIA TYPE: Primary | ICD-10-CM

## 2022-10-18 NOTE — TELEPHONE ENCOUNTER
Please let Ms Goncalves know that her blood work did not reveal any signficant abnormality ( Sodium slightly off - mild 135 vs 135)( B12 and thyroid normal as well) I did not see a lipid profile and she should do this at her convenience ( fasting)

## 2022-10-18 NOTE — TELEPHONE ENCOUNTER
Spoke with patient about her lab results.  Advised her to complete lipid profile at her convenience. Lipid profile ordered.

## 2022-11-03 ENCOUNTER — APPOINTMENT (OUTPATIENT)
Dept: LAB | Facility: CLINIC | Age: 77
End: 2022-11-03
Attending: INTERNAL MEDICINE
Payer: MEDICARE

## 2022-11-03 DIAGNOSIS — E78.5 HYPERLIPIDEMIA, UNSPECIFIED HYPERLIPIDEMIA TYPE: ICD-10-CM

## 2022-11-03 LAB
CHOLEST SERPL-MCNC: 159 MG/DL
HDLC SERPL-MCNC: 66 MG/DL
HDLC SERPL: 2.4 {RATIO}
LDLC SERPL CALC-MCNC: 83 MG/DL
NONHDLC SERPL-MCNC: 93 MG/DL
TRIGL SERPL-MCNC: 50 MG/DL (ref 30–149)

## 2022-11-03 PROCEDURE — 80061 LIPID PANEL: CPT

## 2022-11-03 PROCEDURE — 36415 COLL VENOUS BLD VENIPUNCTURE: CPT

## 2022-11-07 ENCOUNTER — TRANSCRIBE ORDERS (OUTPATIENT)
Dept: OPHTHALMOLOGY | Facility: HOSPITAL | Age: 77
End: 2022-11-07

## 2022-11-07 ENCOUNTER — TELEPHONE (OUTPATIENT)
Dept: INTERNAL MEDICINE | Facility: CLINIC | Age: 77
End: 2022-11-07
Payer: MEDICARE

## 2022-11-07 DIAGNOSIS — H40.053 BILATERAL OCULAR HYPERTENSION: Primary | ICD-10-CM

## 2022-11-17 ENCOUNTER — APPOINTMENT (OUTPATIENT)
Dept: OPHTHALMOLOGY | Facility: HOSPITAL | Age: 77
End: 2022-11-17
Attending: OPHTHALMOLOGY
Payer: MEDICARE

## 2022-11-17 DIAGNOSIS — H40.053 BILATERAL OCULAR HYPERTENSION: ICD-10-CM

## 2022-11-17 PROCEDURE — 92133 CPTRZD OPH DX IMG PST SGM ON: CPT

## 2022-12-14 ENCOUNTER — TELEMEDICINE (OUTPATIENT)
Dept: INTERNAL MEDICINE | Facility: CLINIC | Age: 77
End: 2022-12-14
Payer: MEDICARE

## 2022-12-14 DIAGNOSIS — J06.9 ACUTE URI: Primary | ICD-10-CM

## 2022-12-14 PROCEDURE — 99214 OFFICE O/P EST MOD 30 MIN: CPT | Mod: 95 | Performed by: NURSE PRACTITIONER

## 2022-12-14 RX ORDER — AZITHROMYCIN 250 MG/1
TABLET, FILM COATED ORAL
Qty: 6 TABLET | Refills: 0 | Status: SHIPPED | OUTPATIENT
Start: 2022-12-14 | End: 2022-12-19

## 2022-12-14 ASSESSMENT — ENCOUNTER SYMPTOMS
DIARRHEA: 0
CHILLS: 0
CHEST TIGHTNESS: 0
VOICE CHANGE: 1
RHINORRHEA: 1
WHEEZING: 0
SORE THROAT: 0
ABDOMINAL PAIN: 0
ABDOMINAL DISTENTION: 0
SINUS PRESSURE: 0
SHORTNESS OF BREATH: 0
EYE PAIN: 0
HEADACHES: 1
EYE REDNESS: 0
COUGH: 1
EYE DISCHARGE: 0
EYE ITCHING: 0
SINUS PAIN: 0
NAUSEA: 0
VOMITING: 0
FATIGUE: 0
PALPITATIONS: 0
FEVER: 0

## 2022-12-14 NOTE — PROGRESS NOTES
Verification of Patient Location:  The patient affirms they are currently located in the following state: [place]    Request for Consent:    Audio and Video Encounter   [place], my name is NOE Torres.  Before we proceed, can you please verify your identification by telling me your full name and date of birth?  Can you tell me who is in the room with you?    You and I are about to have a telemedicine check-in or visit because you have requested it.  This is a live video-conference.  I am a real person, speaking to you in real time.  There is no one else with me on the video-conference. I am not recording this conversation and I am asking you not to record it.  This telemedicine visit will be billed to your health insurance or you, if you are self-insured.  You understand you will be responsible for any copayments or coinsurances that apply to your telemedicine visit.  Communication platform used for this encounter:  [employer] Video Visit (Epic Video Client)       Before starting our telemedicine visit, I am required to get your consent for this virtual check-in or visit by telemedicine. Do you consent?      Patient Response to Request for Consent:  Yes      Visit Documentation:  Subjective     Patient ID: Katheryn Goncalves is a 77 y.o. female.  1945      Patient states she started with her upper respiratory symptoms 7 to 10 days ago.  She went to the drugstore and the pharmacist gave her Sudafed which she has been taking as needed primarily at night.  She states during the day she did especially after she takes a steamy shower she is at her status quo.  Has watched her grandchildren during the day and at night she is complaining of stuffy nose she denies fever and had a sore throat that resolved.  she feels as though the symptoms are moving into her chest.  She states she has a cough for white to yellow phlegm.  She had at PCR test that was negative.  Her son was present in the room for part of the  visit.      The following have been reviewed and updated as appropriate in this visit:   Allergies  Meds  Problems       Review of Systems   Constitutional: Negative for chills, fatigue and fever.   HENT: Positive for congestion, postnasal drip, rhinorrhea (clear to white) and voice change. Negative for ear pain, sinus pressure, sinus pain and sore throat (resolved).    Eyes: Negative for pain, discharge, redness and itching.   Respiratory: Positive for cough. Negative for chest tightness, shortness of breath and wheezing.    Cardiovascular: Negative for chest pain and palpitations.   Gastrointestinal: Negative for abdominal distention, abdominal pain, diarrhea, nausea and vomiting.   Neurological: Positive for headaches.         Assessment/Plan   Diagnoses and all orders for this visit:    Acute URI (Primary)  Assessment & Plan:  Patient will start a Z-Sebastián as directed.  Continue with her Sudafed on an as-needed basis.  Instructed patient to increase her fluids, rest and she may use Tylenol or Advil as needed.  Notify the office if her symptoms are not improving in 7 to 10 days.      Other orders  -     azithromycin (ZITHROMAX) 250 mg tablet; Take 2 tablets the first day, then 1 tablet daily for 4 days.      Time Spent:  I spent 18 minutes on this date of service performing the following activities: obtaining history reviewing medications, reviewing allergies, reviewing past medical history, ordering prescriptions and documentation..

## 2022-12-14 NOTE — ASSESSMENT & PLAN NOTE
Patient will start a Z-Sebastián as directed.  Continue with her Sudafed on an as-needed basis.  Instructed patient to increase her fluids, rest and she may use Tylenol or Advil as needed.  Notify the office if her symptoms are not improving in 7 to 10 days.

## 2022-12-22 ENCOUNTER — TELEPHONE (OUTPATIENT)
Dept: INTERNAL MEDICINE | Facility: CLINIC | Age: 77
End: 2022-12-22
Payer: MEDICARE

## 2022-12-22 NOTE — TELEPHONE ENCOUNTER
Patient had a telemedicine appointment with you on 12/14/2022.  She still is bringing up mucus and wants to know if she can use mucinex.

## 2022-12-23 NOTE — TELEPHONE ENCOUNTER
Pt states she finished  the z pack and stopped sudafed. She is still coughing and wanted to know if she could take mucinex .She also states she had hives yesterday and took a Claritin.  Instructed patient that she could take the Mucinex twice a day and if she was still coughing that she should come into the office on Tuesday when we reopen.

## 2023-01-27 RX ORDER — LEVOTHYROXINE SODIUM 88 UG/1
TABLET ORAL
Qty: 90 TABLET | Refills: 3 | Status: SHIPPED | OUTPATIENT
Start: 2023-01-27 | End: 2024-01-22

## 2023-01-29 ENCOUNTER — NURSE TRIAGE (OUTPATIENT)
Dept: INTERNAL MEDICINE | Facility: CLINIC | Age: 78
End: 2023-01-29
Payer: MEDICARE

## 2023-01-29 NOTE — TELEPHONE ENCOUNTER
Synopsis: Developed nasal congestion and body aches yesterday. This morning feeling the same and took a Covid test. Positive Covid. Discussed at home care for viral illness and Paxlovid. Would like Paxlovid, aware to stop Atorvastatin while on medication. Continue with OTC medication. Reviewed quarantine and mask wearing after quarantine. To call if any change/worsening in symptoms.     Disposition:  Home Care  Care Advice:  Care Advice Given     Given By Given At Modified    Mary Branch CRNP 1/29/2023  9:31 AM No    HOME CARE:   * You should be able to treat this at home.    Mary Branch CRNP 1/29/2023  9:31 AM No    REASSURANCE AND EDUCATION - POSITIVE COVID-19 LAB TEST AND MILD SYMPTOMS:  * You had a recent lab test for COVID-19 and it came back positive.  * A positive result on a PCR or rapid self-test kit is highly accurate for diagnosing COVID-19. It is highly likely that you have COVID-19.  * From what you have told me, your symptoms are mild. That is reassuring.  * Here's some care advice to help you and to help prevent others from getting sick.    Mary Branch CRNP 1/29/2023  9:31 AM No    GENERAL CARE ADVICE FOR COVID-19 SYMPTOMS:  * The symptoms are generally treated the same whether you have COVID-19, influenza or some other respiratory virus.  * Cough: Use cough drops.  * Feeling dehydrated: Drink extra liquids. If the air in your home is dry, use a humidifier.  * Fever: For fever over 101 F (38.3 C), take acetaminophen every 4 to 6 hours (Adults 650 mg) OR ibuprofen every 6 to 8 hours (Adults 400 mg). Before taking any medicine, read all the instructions on the package. Do not take aspirin unless your doctor has prescribed it for you.  * Muscle aches, headache, and other pains: Often this comes and goes with the fever. Take acetaminophen every 4 to 6 hours (Adults 650 mg) OR ibuprofen every 6 to 8 hours (Adults 400 mg). Before taking any medicine, read all the  instructions on the package.  * Sore throat: Try throat lozenges, hard candy or warm chicken broth.    Mary Branch CRNP 1/29/2023  9:31 AM No    COUGH MEDICINES:   * COUGH DROPS: Over-the-counter cough drops can help a lot, especially for mild coughs. They soothe an irritated throat and remove the tickle sensation in the back of the throat. Cough drops are easy to carry with you.  * COUGH SYRUP WITH DEXTROMETHORPHAN: An over-the-counter cough syrup can help your cough. The most common cough suppressant in over-the-counter cough medicines is dextromethorphan.  * HOME REMEDY - HARD CANDY: Hard candy works just as well as over-the-counter cough drops. People who have diabetes should use sugar-free candy.  * HOME REMEDY - HONEY: This old home remedy has been shown to help decrease coughing at night. The adult dosage is 2 teaspoons (10 ml) at bedtime.    Mary Branch CRNP 1/29/2023  9:31 AM No    COUGH SYRUP WITH DEXTROMETHORPHAN - EXTRA NOTES AND WARNINGS:  * Do not try to completely stop coughs that produce mucus and phlegm.  * Coughing is helpful. It brings up the mucus from the lungs and helps prevent pneumonia.  * RESEARCH: Some research studies show that dextromethorphan reduces the frequency and severity of cough in those 18 years and older without significant adverse effects. Other studies suggest that dextromethorphan is no better than placebo at reducing a cough.  * DRUG ABUSE: It should be noted that dextromethorphan has become a drug of abuse. This problem is seen most often in teenagers. Overdose symptoms can range from giggling and feeling high to hallucinations and coma.  * WARNING: Do not take dextromethorphan if you are taking a monoamine oxidase (MAO) inhibitor now or in the past 2 weeks. Examples of MAO inhibitors include isocarboxazid (Marplan), phenelzine (Nardil), selegiline (Eldepryl, Emsam, Zelapar), and tranylcypromine (Parnate).  * WARNING: Do not take dextromethorphan  "if you are taking venlafaxine (Effexor).  * Before taking any medicine, read all the instructions on the package.    Mary Branch CRNP 1/29/2023  9:31 AM No    CALL BACK IF:   * Fever over 103 F (39.4 C)  * Fever lasts over 3 days  * Fever returns after being gone for 24 hours  * Chest pain or difficulty breathing occurs  * You become worse    Mary Branch CRNP 1/29/2023  9:31 AM No    CARE ADVICE given per COVID-19 - DIAGNOSED OR SUSPECTED (Adult) guideline.       Patient/Caregiver understands and will follow care advice?  Yes, plans to follow advice        Orders Placed This Encounter:  Orders Placed This Encounter   • nirmatrelvir-ritonavir (PAXLOVID) tablets,dose pack dose package     Sig: Take 3 tablets by mouth 2 (two) times a day for 5 days. Take nirmatrelvir 300 mg (TWO 150mg tablets) PO PLUS ritonavir 100 mg (ONE 100mg tablet) by mouth, with all three tablets taken together twice daily     Dispense:  30 tablet     Refill:  0             Reason for Disposition  • [1] COVID-19 diagnosed by positive lab test (e.g., PCR, rapid self-test kit) AND [2] mild symptoms (e.g., cough, fever, others) AND [3] no complications or SOB    Answer Assessment - Initial Assessment Questions  1. COVID-19 DIAGNOSIS: \"Who made your COVID-19 diagnosis?\" \"Was it confirmed by a positive lab test or self-test?\" If not diagnosed by a doctor (or NP/PA), ask \"Are there lots of cases (community spread) where you live?\" Note: See public health department website, if unsure.      Self test   2. COVID-19 EXPOSURE: \"Was there any known exposure to COVID before the symptoms began?\" CDC Definition of close contact: within 6 feet (2 meters) for a total of 15 minutes or more over a 24-hour period.       No known   3. ONSET: \"When did the COVID-19 symptoms start?\"       Yesterday  4. WORST SYMPTOM: \"What is your worst symptom?\" (e.g., cough, fever, shortness of breath, muscle aches)      Nasal congestion   5. COUGH: \"Do you " "have a cough?\" If Yes, ask: \"How bad is the cough?\"        Slight dry   6. FEVER: \"Do you have a fever?\" If Yes, ask: \"What is your temperature, how was it measured, and when did it start?\"      afebrile  7. RESPIRATORY STATUS: \"Describe your breathing?\" (e.g., shortness of breath, wheezing, unable to speak)       normal  8. BETTER-SAME-WORSE: \"Are you getting better, staying the same or getting worse compared to yesterday?\"  If getting worse, ask, \"In what way?\"      same  9. HIGH RISK DISEASE: \"Do you have any chronic medical problems?\" (e.g., asthma, heart or lung disease, weak immune system, obesity, etc.)      HLD  10. VACCINE: \"Have you had the COVID-19 vaccine?\" If Yes, ask: \"Which one, how many shots, when did you get it?\"        yes  11. BOOSTER: \"Have you received your COVID-19 booster?\" If Yes, ask: \"Which one and when did you get it?\"        yes  12. PREGNANCY: \"Is there any chance you are pregnant?\" \"When was your last menstrual period?\"        no  13. OTHER SYMPTOMS: \"Do you have any other symptoms?\"  (e.g., chills, fatigue, headache, loss of smell or taste, muscle pain, sore throat)       Body aches   14. O2 SATURATION MONITOR:  \"Do you use an oxygen saturation monitor (pulse oximeter) at home?\" If Yes, ask \"What is your reading (oxygen level) today?\" \"What is your usual oxygen saturation reading?\" (e.g., 95%)        na    Protocols used: CORONAVIRUS (COVID-19) DIAGNOSED OR SUSPECTED-ADULT-AH      "

## 2023-02-11 ENCOUNTER — NURSE TRIAGE (OUTPATIENT)
Dept: INTERNAL MEDICINE | Facility: CLINIC | Age: 78
End: 2023-02-11
Payer: MEDICARE

## 2023-02-11 NOTE — TELEPHONE ENCOUNTER
Synopsis:  On call - patient called and reported she was tested positive for covid on 1/29. She finished paxlovid. A week later on 2/9 started to have cold symptoms again. She tested on 2/10 positive but with faint line. Having scratchy throat. PND. Not much cough. Some congestion. Fatigue. No fever. No chest pain or shortness of breath.     Possible rebound covid. Continue supportive care. Can take mucinex and flonase. Stay hydrated. Call if symptoms worsen.     Disposition:  Home Care  Care Advice:  Care Advice Given     No Care Advice given for this encounter.        Orders Placed This Encounter:  No orders of the defined types were placed in this encounter.

## 2023-02-16 RX ORDER — ATORVASTATIN CALCIUM 20 MG/1
20 TABLET, FILM COATED ORAL DAILY
Qty: 90 TABLET | Refills: 3 | Status: SHIPPED | OUTPATIENT
Start: 2023-02-16 | End: 2024-03-21 | Stop reason: SDUPTHER

## 2023-02-16 NOTE — TELEPHONE ENCOUNTER
Medicine Refill Request    Last Office: 9/28/2022   Last Consult Visit: 5/30/2019  Last Telemedicine Visit: 12/14/2022 Jovanna Hardy CRNP    Next Appointment: 10/5/2023      Current Outpatient Medications:   •  acetaminophen (TYLENOL) 500 mg tablet, Take 500 mg by mouth every 6 (six) hours as needed for mild pain (Takes PRN only)., Disp: , Rfl:   •  atorvastatin (LIPITOR) 20 mg tablet, TAKE 1 TABLET BY MOUTH EVERY DAY (Patient taking differently: Take 20 mg by mouth every evening.), Disp: 90 tablet, Rfl: 3  •  denosumab (PROLIA) 60 mg/mL syringe, Inject 60 mg under the skin once. Injection 2 x yearly, Disp: , Rfl:   •  esomeprazole (NexIUM) 40 mg capsule, Take 1 capsule (40 mg total) by mouth daily., Disp: 90 capsule, Rfl: 0  •  estradioL (ESTRACE) 0.01 % (0.1 mg/gram) vaginal cream, Insert 2 g into the vagina 2 (two) times a week (Mon, Thu)., Disp: , Rfl:   •  levothyroxine (SYNTHROID) 88 mcg tablet, TAKE 1 TABLET BY MOUTH EVERY DAY, Disp: 90 tablet, Rfl: 3  •  oxybutynin (DITROPAN) 5 mg tablet, Take 1 tablet (5 mg total) by mouth nightly as needed (urinary frrequency)., Disp: 30 tablet, Rfl: 1  •  traMADoL (ULTRAM) 50 mg tablet, Take 1 tablet (50 mg total) by mouth every 6 (six) hours as needed for moderate pain or severe pain (For post-op pain. Do not drive while taking)., Disp: 10 tablet, Rfl: 0      BP Readings from Last 3 Encounters:   09/28/22 114/60   09/28/22 114/60   08/10/22 131/64       Recent Lab results:  Lab Results   Component Value Date    CHOL 159 11/03/2022   ,   Lab Results   Component Value Date    HDL 66 11/03/2022   ,   Lab Results   Component Value Date    LDLCALC 83 11/03/2022   ,   Lab Results   Component Value Date    TRIG 50 11/03/2022        Lab Results   Component Value Date    GLUCOSE 78 10/11/2022   , No results found for: HGBA1C      Lab Results   Component Value Date    CREATININE 0.7 10/11/2022       Lab Results   Component Value Date    TSH 1.19 10/11/2022

## 2023-03-13 ENCOUNTER — TRANSCRIBE ORDERS (OUTPATIENT)
Dept: SCHEDULING | Age: 78
End: 2023-03-13

## 2023-03-13 DIAGNOSIS — M81.0 AGE-RELATED OSTEOPOROSIS WITHOUT CURRENT PATHOLOGICAL FRACTURE: Primary | ICD-10-CM

## 2023-04-07 ENCOUNTER — HOSPITAL ENCOUNTER (OUTPATIENT)
Dept: RADIOLOGY | Age: 78
Discharge: HOME | End: 2023-04-07
Attending: INTERNAL MEDICINE
Payer: MEDICARE

## 2023-04-07 DIAGNOSIS — M81.0 AGE-RELATED OSTEOPOROSIS WITHOUT CURRENT PATHOLOGICAL FRACTURE: ICD-10-CM

## 2023-04-07 PROCEDURE — 77080 DXA BONE DENSITY AXIAL: CPT

## 2023-07-24 ENCOUNTER — APPOINTMENT (OUTPATIENT)
Dept: LAB | Facility: CLINIC | Age: 78
End: 2023-07-24
Attending: INTERNAL MEDICINE
Payer: MEDICARE

## 2023-07-24 ENCOUNTER — TRANSCRIBE ORDERS (OUTPATIENT)
Dept: LAB | Facility: CLINIC | Age: 78
End: 2023-07-24

## 2023-07-24 DIAGNOSIS — E55.9 VITAMIN D DEFICIENCY, UNSPECIFIED: ICD-10-CM

## 2023-07-24 DIAGNOSIS — E55.9 VITAMIN D DEFICIENCY, UNSPECIFIED: Primary | ICD-10-CM

## 2023-07-24 PROCEDURE — 82306 VITAMIN D 25 HYDROXY: CPT

## 2023-07-24 PROCEDURE — 36415 COLL VENOUS BLD VENIPUNCTURE: CPT

## 2023-07-25 LAB — 25(OH)D3 SERPL-MCNC: 59 NG/ML (ref 30–100)

## 2023-07-27 ENCOUNTER — OFFICE VISIT (OUTPATIENT)
Dept: INTERNAL MEDICINE | Facility: CLINIC | Age: 78
End: 2023-07-27
Payer: MEDICARE

## 2023-07-27 ENCOUNTER — TELEPHONE (OUTPATIENT)
Dept: SCHEDULING | Facility: CLINIC | Age: 78
End: 2023-07-27
Payer: MEDICARE

## 2023-07-27 ENCOUNTER — APPOINTMENT (OUTPATIENT)
Dept: LAB | Facility: CLINIC | Age: 78
End: 2023-07-27
Attending: INTERNAL MEDICINE
Payer: MEDICARE

## 2023-07-27 VITALS
OXYGEN SATURATION: 98 % | DIASTOLIC BLOOD PRESSURE: 76 MMHG | TEMPERATURE: 98.6 F | WEIGHT: 138 LBS | HEIGHT: 65 IN | HEART RATE: 54 BPM | SYSTOLIC BLOOD PRESSURE: 116 MMHG | BODY MASS INDEX: 22.99 KG/M2

## 2023-07-27 DIAGNOSIS — L29.9 GENERALIZED PRURITUS: ICD-10-CM

## 2023-07-27 DIAGNOSIS — R21 RASH: Primary | ICD-10-CM

## 2023-07-27 LAB
ALBUMIN SERPL-MCNC: 4.2 G/DL (ref 3.5–5.7)
ALP SERPL-CCNC: 61 IU/L (ref 34–125)
ALT SERPL-CCNC: 15 IU/L (ref 7–52)
ANION GAP SERPL CALC-SCNC: 9 MEQ/L (ref 3–15)
AST SERPL-CCNC: 19 IU/L (ref 13–39)
BASOPHILS # BLD: 0.06 K/UL (ref 0.01–0.1)
BASOPHILS NFR BLD: 1.1 %
BILIRUB SERPL-MCNC: 0.5 MG/DL (ref 0.3–1.2)
BUN SERPL-MCNC: 12 MG/DL (ref 7–25)
CALCIUM SERPL-MCNC: 9.2 MG/DL (ref 8.6–10.3)
CHLORIDE SERPL-SCNC: 99 MEQ/L (ref 98–107)
CO2 SERPL-SCNC: 27 MEQ/L (ref 21–31)
CREAT SERPL-MCNC: 0.6 MG/DL (ref 0.6–1.2)
DIFFERENTIAL METHOD BLD: NORMAL
EOSINOPHIL # BLD: 0.24 K/UL (ref 0.04–0.36)
EOSINOPHIL NFR BLD: 4.3 %
ERYTHROCYTE [DISTWIDTH] IN BLOOD BY AUTOMATED COUNT: 12 % (ref 11.7–14.4)
GFR SERPL CREATININE-BSD FRML MDRD: >60 ML/MIN/1.73M*2
GLUCOSE SERPL-MCNC: 86 MG/DL (ref 70–99)
HCT VFR BLDCO AUTO: 39.6 % (ref 35–45)
HGB BLD-MCNC: 13.4 G/DL (ref 11.8–15.7)
IMM GRANULOCYTES # BLD AUTO: 0.01 K/UL (ref 0–0.08)
IMM GRANULOCYTES NFR BLD AUTO: 0.2 %
LYMPHOCYTES # BLD: 1.67 K/UL (ref 1.2–3.5)
LYMPHOCYTES NFR BLD: 30.1 %
MCH RBC QN AUTO: 32.3 PG (ref 28–33.2)
MCHC RBC AUTO-ENTMCNC: 33.8 G/DL (ref 32.2–35.5)
MCV RBC AUTO: 95.4 FL (ref 83–98)
MONOCYTES # BLD: 0.46 K/UL (ref 0.28–0.8)
MONOCYTES NFR BLD: 8.3 %
NEUTROPHILS # BLD: 3.11 K/UL (ref 1.7–7)
NEUTS SEG NFR BLD: 56 %
NRBC BLD-RTO: 0 %
PDW BLD AUTO: 10.2 FL (ref 9.4–12.3)
PLATELET # BLD AUTO: 204 K/UL (ref 150–369)
POTASSIUM SERPL-SCNC: 3.9 MEQ/L (ref 3.5–5.1)
PROT SERPL-MCNC: 6.4 G/DL (ref 6–8.2)
RBC # BLD AUTO: 4.15 M/UL (ref 3.93–5.22)
SODIUM SERPL-SCNC: 135 MEQ/L (ref 136–145)
WBC # BLD AUTO: 5.55 K/UL (ref 3.8–10.5)

## 2023-07-27 PROCEDURE — 80053 COMPREHEN METABOLIC PANEL: CPT

## 2023-07-27 PROCEDURE — 36415 COLL VENOUS BLD VENIPUNCTURE: CPT

## 2023-07-27 PROCEDURE — 99213 OFFICE O/P EST LOW 20 MIN: CPT | Performed by: INTERNAL MEDICINE

## 2023-07-27 PROCEDURE — 85025 COMPLETE CBC W/AUTO DIFF WBC: CPT

## 2023-07-27 PROCEDURE — 84443 ASSAY THYROID STIM HORMONE: CPT

## 2023-07-27 NOTE — PROGRESS NOTES
Subjective      Patient ID: Katheryn Goncalves is a 77 y.o. female.    HPI  She is here with itching all over - has been using D mannose and estrace cream for her recurrent UTIs- was itching in the perineum - was given clotrimazole/betametasone externally and vagisil which helped.  No has itching on the lower buttocks - with a rash. No fever or chills. No pain in the area. Seems worse when she sweats.     The following have been reviewed and updated as appropriate in this visit:   Allergies  Meds  Problems       Past Medical History:   Diagnosis Date   • Marshall esophagus    • High cholesterol    • Hypothyroidism    • Osteoporosis      Past Surgical History:   Procedure Laterality Date   • BLADDER SUSPENSION     • CHOLECYSTECTOMY     • FEMORAL HERNIA REPAIR Right     bowel dusky but OK - no resection   • TOTAL VAGINAL HYSTERECTOMY  1997     Family History   Problem Relation Age of Onset   • Colon cancer Biological Mother    • Heart disease Biological Father    • Atrial fibrillation Biological Brother    • Cancer Biological Brother      Social History     Socioeconomic History   • Marital status:      Spouse name: Not on file   • Number of children: Not on file   • Years of education: Not on file   • Highest education level: Not on file   Occupational History   • Not on file   Tobacco Use   • Smoking status: Never   • Smokeless tobacco: Never   Vaping Use   • Vaping Use: Never used   Substance and Sexual Activity   • Alcohol use: No   • Drug use: No   • Sexual activity: Never   Other Topics Concern   • Not on file   Social History Narrative   • Not on file     Social Determinants of Health     Financial Resource Strain: Not on file   Food Insecurity: Not on file   Transportation Needs: Not on file   Physical Activity: Not on file   Stress: Not on file   Social Connections: Not on file   Intimate Partner Violence: Not on file   Housing Stability: Not on file       Review of Systems   Constitutional: Negative for  "chills and fever.   Genitourinary: Negative for genital sores and vaginal discharge.   Skin: Positive for rash.       No Known Allergies  Current Outpatient Medications   Medication Sig Dispense Refill   • acetaminophen (TYLENOL) 500 mg tablet Take 500 mg by mouth every 6 (six) hours as needed for mild pain (Takes PRN only).     • atorvastatin (LIPITOR) 20 mg tablet Take 1 tablet (20 mg total) by mouth daily. In the evening 90 tablet 3   • denosumab (PROLIA) 60 mg/mL syringe Inject 60 mg under the skin once. Injection 2 x yearly     • esomeprazole (NexIUM) 40 mg capsule Take 1 capsule (40 mg total) by mouth daily. 90 capsule 0   • estradioL (ESTRACE) 0.01 % (0.1 mg/gram) vaginal cream Insert 2 g into the vagina 2 (two) times a week (Mon, Thu).     • levothyroxine (SYNTHROID) 88 mcg tablet TAKE 1 TABLET BY MOUTH EVERY DAY 90 tablet 3   • traMADoL (ULTRAM) 50 mg tablet Take 1 tablet (50 mg total) by mouth every 6 (six) hours as needed for moderate pain or severe pain (For post-op pain. Do not drive while taking). 10 tablet 0     No current facility-administered medications for this visit.       Objective   Vitals:    07/27/23 1004   BP: 116/76   Pulse: (!) 54   Temp: 37 °C (98.6 °F)   SpO2: 98%   Weight: 62.6 kg (138 lb)   Height: 1.651 m (5' 5\")       Physical Exam  Vitals and nursing note reviewed.   Constitutional:       Appearance: She is well-developed.   HENT:      Head: Normocephalic and atraumatic.   Neck:      Thyroid: No thyromegaly.      Vascular: No carotid bruit.   Cardiovascular:      Rate and Rhythm: Normal rate and regular rhythm.      Pulses: Normal pulses.      Heart sounds: Normal heart sounds. No murmur heard.  Pulmonary:      Effort: Pulmonary effort is normal.      Breath sounds: Normal breath sounds.   Abdominal:      General: Bowel sounds are normal.      Palpations: Abdomen is soft. There is no mass.      Tenderness: There is no abdominal tenderness.   Musculoskeletal:      Cervical back: " Normal range of motion and neck supple.   Skin:     General: Skin is warm and dry.      Comments: Scattered erythematous patches  One oval with distinct margins and several areas of excoriation   No ulcerations    Skin is generally dry and flaky   Neurological:      Mental Status: She is alert and oriented to person, place, and time.   Psychiatric:         Behavior: Behavior normal.         Thought Content: Thought content normal.         Judgment: Judgment normal.         Assessment/Plan   Problem List Items Addressed This Visit     Rash - Primary     Looks like a candidal infection.  Advised to keep the area dry.  Use clove Triamazole betamethasone twice daily for 2 weeks.  She is to follow-up with me if not improved.         Generalized pruritus     I think her skin is generally dry which creates the pruritus.  We will however screen for any underlying systemic problems check CBC with differential CMP and thyroid function.  Advise she use a gentle moisturizer.  Shower less frequently.  Suggested Eucerin as a good moisturizer.         Relevant Orders    CBC and differential (Completed)    Comprehensive metabolic panel (Completed)    TSH 3rd Generation (Completed)       Sugar Means MD    7/28/2023

## 2023-07-27 NOTE — TELEPHONE ENCOUNTER
[facility] Appointment Request   Provider: Dr. Vásquez  Appointment Type: New Patient  Reason for Visit: establish care / referred by Dr. Eva Means  Available Day and Time: pt states she is okay to wait a few months since she recently saw Dr. Velasquez; pt can be flexible  Best Contact Number: [phone]    The practice will reach out to schedule your appointment within the next 2 business days.

## 2023-07-28 PROBLEM — R21 RASH: Status: ACTIVE | Noted: 2023-07-28

## 2023-07-28 PROBLEM — L29.9 GENERALIZED PRURITUS: Status: ACTIVE | Noted: 2023-07-28

## 2023-07-28 LAB — TSH SERPL DL<=0.05 MIU/L-ACNC: 1.13 MIU/L (ref 0.34–5.6)

## 2023-07-28 ASSESSMENT — ENCOUNTER SYMPTOMS
CHILLS: 0
FEVER: 0

## 2023-07-29 NOTE — ASSESSMENT & PLAN NOTE
Looks like a candidal infection.  Advised to keep the area dry.  Use clove Triamazole betamethasone twice daily for 2 weeks.  She is to follow-up with me if not improved.

## 2023-07-29 NOTE — ASSESSMENT & PLAN NOTE
I think her skin is generally dry which creates the pruritus.  We will however screen for any underlying systemic problems check CBC with differential CMP and thyroid function.  Advise she use a gentle moisturizer.  Shower less frequently.  Suggested Eucerin as a good moisturizer.

## 2023-08-08 ENCOUNTER — TELEPHONE (OUTPATIENT)
Dept: INTERNAL MEDICINE | Facility: CLINIC | Age: 78
End: 2023-08-08
Payer: MEDICARE

## 2023-08-24 ENCOUNTER — TELEPHONE (OUTPATIENT)
Dept: INTERNAL MEDICINE | Facility: CLINIC | Age: 78
End: 2023-08-24
Payer: MEDICARE

## 2023-08-24 NOTE — TELEPHONE ENCOUNTER
Patient is calling for her lab results.  She also wants to know if you contacted Dr. Vásquez on her behalf to accept her as a patient.

## 2023-08-25 NOTE — TELEPHONE ENCOUNTER
I left a message for the pt - Dr. Vásquez is not taking new pts - her labs did not reveal significant abnormalities

## 2023-09-11 ENCOUNTER — TELEPHONE (OUTPATIENT)
Dept: INTERNAL MEDICINE | Facility: CLINIC | Age: 78
End: 2023-09-11

## 2023-09-11 NOTE — TELEPHONE ENCOUNTER
Patient called with complaints of allergies of dog and is babysitting patient is concerned about the itchy nose and throat turning into athsma with difficulty breathing. Patient was offered appointment today but can not come in today. Ok to schedule for tomorrow by [facility].

## 2023-09-12 ENCOUNTER — OFFICE VISIT (OUTPATIENT)
Dept: INTERNAL MEDICINE | Facility: CLINIC | Age: 78
End: 2023-09-12
Payer: MEDICARE

## 2023-09-12 VITALS
HEIGHT: 65 IN | WEIGHT: 139.4 LBS | SYSTOLIC BLOOD PRESSURE: 126 MMHG | DIASTOLIC BLOOD PRESSURE: 64 MMHG | RESPIRATION RATE: 18 BRPM | BODY MASS INDEX: 23.22 KG/M2 | OXYGEN SATURATION: 96 % | HEART RATE: 65 BPM | TEMPERATURE: 98.2 F

## 2023-09-12 DIAGNOSIS — J45.20 MILD INTERMITTENT REACTIVE AIRWAY DISEASE WITHOUT COMPLICATION: Primary | ICD-10-CM

## 2023-09-12 PROBLEM — J45.909 REACTIVE AIRWAY DISEASE: Status: ACTIVE | Noted: 2023-09-12

## 2023-09-12 PROCEDURE — 99213 OFFICE O/P EST LOW 20 MIN: CPT | Performed by: NURSE PRACTITIONER

## 2023-09-12 RX ORDER — ALBUTEROL SULFATE 90 UG/1
2 INHALANT RESPIRATORY (INHALATION) EVERY 4 HOURS PRN
Qty: 18 G | Refills: 0 | Status: SHIPPED | OUTPATIENT
Start: 2023-09-12 | End: 2023-12-26 | Stop reason: SDUPTHER

## 2023-09-12 RX ORDER — FLUTICASONE PROPIONATE 50 MCG
1 SPRAY, SUSPENSION (ML) NASAL DAILY
Qty: 16 G | Refills: 1 | Status: SHIPPED | OUTPATIENT
Start: 2023-09-12

## 2023-09-12 ASSESSMENT — ENCOUNTER SYMPTOMS
SINUS PRESSURE: 0
FEVER: 0
CHILLS: 0
DIARRHEA: 0
RHINORRHEA: 1
EYE REDNESS: 1
ABDOMINAL PAIN: 0
NAUSEA: 0
EYE DISCHARGE: 0
WHEEZING: 1
ABDOMINAL DISTENTION: 0
SINUS PAIN: 0
PALPITATIONS: 0
COUGH: 1
EYE PAIN: 0
CHEST TIGHTNESS: 1
VOMITING: 0
EYE ITCHING: 0
SORE THROAT: 0
SHORTNESS OF BREATH: 1

## 2023-09-12 NOTE — PROGRESS NOTES
Internal Medicine Note       Reason for visit: Allergies (Patient is exposed to dogs, hx of allergies to dogs, C/O wheezing, denies CP, occurring for two weeks )       MADDIE Goncalves is a 77 y.o. female who presents with allergies. 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 She started taking claratin for her sx but they are getting worse. Her allergies have moved to her chest with wheezing and chest tightness. She states if she doesn't babysit for a few days she feels better. She states the baby is going to day care in the morning and she will pick her up and take her to her own house. She has been using claratin and flonase with relief. She took decongestant and zyrtec but they were too much for her. She did babysit yesterday        Past Medical History:   Diagnosis Date    Marshall esophagus     High cholesterol     Hypothyroidism     Osteoporosis      Past Surgical History:   Procedure Laterality Date    BLADDER SUSPENSION      CHOLECYSTECTOMY      FEMORAL HERNIA REPAIR Right     bowel dusky but OK - no resection    TOTAL VAGINAL HYSTERECTOMY  1997     Social History     Social History Narrative    Not on file     Family History   Problem Relation Age of Onset    Colon cancer Biological Mother     Heart disease Biological Father     Atrial fibrillation Biological Brother     Cancer Biological Brother      Patient has no known allergies.  Current Outpatient Medications   Medication Sig Dispense Refill    acetaminophen (TYLENOL) 500 mg tablet Take 500 mg by mouth every 6 (six) hours as needed for mild pain (Takes PRN only).      albuterol HFA 90 mcg/actuation inhaler Inhale 2 puffs every 4 (four) hours as needed for wheezing. 18 g 0    atorvastatin (LIPITOR) 20 mg tablet Take 1 tablet (20 mg total) by mouth daily. In the evening 90 tablet 3    esomeprazole (NexIUM) 40 mg capsule Take 1 capsule (40 mg total) by mouth daily. 90 capsule 0    estradioL  (ESTRACE) 0.01 % (0.1 mg/gram) vaginal cream Insert 2 g into the vagina 2 (two) times a week (Mon, Thu).      fluticasone propionate (FLONASE) 50 mcg/actuation nasal spray Administer 1 spray into each nostril daily. 16 g 1    levothyroxine (SYNTHROID) 88 mcg tablet TAKE 1 TABLET BY MOUTH EVERY DAY 90 tablet 3    denosumab (PROLIA) 60 mg/mL syringe Inject 60 mg under the skin once. Injection 2 x yearly       No current facility-administered medications for this visit.       Review of Systems   Constitutional: Negative for chills and fever.   HENT: Positive for postnasal drip and rhinorrhea (clear to white). Negative for congestion, ear discharge, ear pain, sinus pressure, sinus pain and sore throat.    Eyes: Positive for redness. Negative for pain, discharge and itching.   Respiratory: Positive for cough (white minimal ), chest tightness, shortness of breath and wheezing.    Cardiovascular: Negative for chest pain and palpitations.   Gastrointestinal: Negative for abdominal distention, abdominal pain, diarrhea, nausea and vomiting.   Allergic/Immunologic: Positive for environmental allergies.       Objective   Vitals:    09/12/23 1002   BP: 126/64   Pulse: 65   Resp: 18   Temp: 36.8 °C (98.2 °F)   SpO2: 96%       Physical Exam  Constitutional:       Appearance: Normal appearance.   HENT:      Right Ear: Tympanic membrane and ear canal normal.      Left Ear: Tympanic membrane and ear canal normal.      Nose: Nose normal.      Mouth/Throat:      Mouth: Mucous membranes are moist.      Pharynx: Posterior oropharyngeal erythema present. No oropharyngeal exudate.   Eyes:      Conjunctiva/sclera: Conjunctivae normal.   Cardiovascular:      Rate and Rhythm: Normal rate and regular rhythm.      Heart sounds: Normal heart sounds.   Pulmonary:      Effort: Pulmonary effort is normal.      Breath sounds: Wheezing (very mild in right upper lobe. cleared with cough) present.   Abdominal:      General: Bowel sounds are normal.  There is no distension.      Palpations: Abdomen is soft.      Tenderness: There is no abdominal tenderness.   Musculoskeletal:      Cervical back: Neck supple.   Skin:     General: Skin is warm and dry.   Neurological:      Mental Status: She is alert.         Lab Results   Component Value Date    WBC 5.55 07/27/2023    HGB 13.4 07/27/2023     07/27/2023    CHOL 159 11/03/2022    TRIG 50 11/03/2022    HDL 66 11/03/2022    ALT 15 07/27/2023    AST 19 07/27/2023     (L) 07/27/2023    K 3.9 07/27/2023    CREATININE 0.6 07/27/2023    TSH 1.13 07/27/2023    INR 1.0 03/18/2018          Current Outpatient Medications:     acetaminophen (TYLENOL) 500 mg tablet, Take 500 mg by mouth every 6 (six) hours as needed for mild pain (Takes PRN only)., Disp: , Rfl:     albuterol HFA 90 mcg/actuation inhaler, Inhale 2 puffs every 4 (four) hours as needed for wheezing., Disp: 18 g, Rfl: 0    atorvastatin (LIPITOR) 20 mg tablet, Take 1 tablet (20 mg total) by mouth daily. In the evening, Disp: 90 tablet, Rfl: 3    esomeprazole (NexIUM) 40 mg capsule, Take 1 capsule (40 mg total) by mouth daily., Disp: 90 capsule, Rfl: 0    estradioL (ESTRACE) 0.01 % (0.1 mg/gram) vaginal cream, Insert 2 g into the vagina 2 (two) times a week (Mon, Thu)., Disp: , Rfl:     fluticasone propionate (FLONASE) 50 mcg/actuation nasal spray, Administer 1 spray into each nostril daily., Disp: 16 g, Rfl: 1    levothyroxine (SYNTHROID) 88 mcg tablet, TAKE 1 TABLET BY MOUTH EVERY DAY, Disp: 90 tablet, Rfl: 3    denosumab (PROLIA) 60 mg/mL syringe, Inject 60 mg under the skin once. Injection 2 x yearly, Disp: , Rfl:       Assessment   Diagnoses and all orders for this visit:    Mild intermittent reactive airway disease without complication (Primary)  Assessment & Plan:  intermittant and Related to dog allergy. Pt will be changing her routine so she is not going to her daughter's house to babysit due to the dogs. She will use claratin and  flonase for sx as well as mucinex to loosen phelgm. She was provided with an inhaler to try when she is wheezing. Pt will rto if sx persist.       Other orders  -     albuterol HFA 90 mcg/actuation inhaler; Inhale 2 puffs every 4 (four) hours as needed for wheezing.  -     fluticasone propionate (FLONASE) 50 mcg/actuation nasal spray; Administer 1 spray into each nostril daily.          NOE Torres  9/12/2023  10:15 AM

## 2023-09-27 ENCOUNTER — TRANSCRIBE ORDERS (OUTPATIENT)
Dept: SCHEDULING | Age: 78
End: 2023-09-27

## 2023-09-27 DIAGNOSIS — Z12.31 ENCOUNTER FOR SCREENING MAMMOGRAM FOR MALIGNANT NEOPLASM OF BREAST: Primary | ICD-10-CM

## 2023-09-28 ENCOUNTER — HOSPITAL ENCOUNTER (OUTPATIENT)
Dept: RADIOLOGY | Facility: CLINIC | Age: 78
Discharge: HOME | End: 2023-09-28
Attending: OBSTETRICS & GYNECOLOGY
Payer: MEDICARE

## 2023-09-28 DIAGNOSIS — Z12.31 ENCOUNTER FOR SCREENING MAMMOGRAM FOR MALIGNANT NEOPLASM OF BREAST: ICD-10-CM

## 2023-09-28 PROCEDURE — 77063 BREAST TOMOSYNTHESIS BI: CPT

## 2023-10-05 ENCOUNTER — OFFICE VISIT (OUTPATIENT)
Dept: INTERNAL MEDICINE | Facility: CLINIC | Age: 78
End: 2023-10-05
Payer: MEDICARE

## 2023-10-05 VITALS
OXYGEN SATURATION: 98 % | SYSTOLIC BLOOD PRESSURE: 128 MMHG | TEMPERATURE: 98.1 F | HEART RATE: 53 BPM | WEIGHT: 139 LBS | BODY MASS INDEX: 23.16 KG/M2 | HEIGHT: 65 IN | DIASTOLIC BLOOD PRESSURE: 84 MMHG

## 2023-10-05 DIAGNOSIS — Z98.890 HISTORY OF SACROCOLPOPEXY: ICD-10-CM

## 2023-10-05 DIAGNOSIS — E03.9 HYPOTHYROIDISM, UNSPECIFIED TYPE: ICD-10-CM

## 2023-10-05 DIAGNOSIS — Z00.00 MEDICARE ANNUAL WELLNESS VISIT, SUBSEQUENT: ICD-10-CM

## 2023-10-05 DIAGNOSIS — J45.20 MILD INTERMITTENT REACTIVE AIRWAY DISEASE WITHOUT COMPLICATION: ICD-10-CM

## 2023-10-05 DIAGNOSIS — K22.70 BARRETT'S ESOPHAGUS WITHOUT DYSPLASIA: ICD-10-CM

## 2023-10-05 DIAGNOSIS — M81.0 AGE-RELATED OSTEOPOROSIS WITHOUT CURRENT PATHOLOGICAL FRACTURE: ICD-10-CM

## 2023-10-05 DIAGNOSIS — E78.5 HYPERLIPIDEMIA, UNSPECIFIED HYPERLIPIDEMIA TYPE: Primary | ICD-10-CM

## 2023-10-05 PROCEDURE — G0008 ADMIN INFLUENZA VIRUS VAC: HCPCS | Performed by: INTERNAL MEDICINE

## 2023-10-05 PROCEDURE — G0439 PPPS, SUBSEQ VISIT: HCPCS | Performed by: INTERNAL MEDICINE

## 2023-10-05 PROCEDURE — 99213 OFFICE O/P EST LOW 20 MIN: CPT | Mod: 25 | Performed by: INTERNAL MEDICINE

## 2023-10-05 PROCEDURE — 90694 VACC AIIV4 NO PRSRV 0.5ML IM: CPT | Performed by: INTERNAL MEDICINE

## 2023-10-05 ASSESSMENT — PATIENT HEALTH QUESTIONNAIRE - PHQ9: SUM OF ALL RESPONSES TO PHQ9 QUESTIONS 1 & 2: 0

## 2023-10-05 ASSESSMENT — MINI COG: TOTAL SCORE: 5

## 2023-10-05 NOTE — PROGRESS NOTES
Subjective      Patient ID: Katheryn Goncalves is a 77 y.o. female.    HPI    She is here for follow up - has been minding her grandchildren and is near their dogs ( fide nguyen)  - she was wheezing when near them  - was given an inhaler and claritin and is now improved - has significant allergy to horses , dog and cats -   The following have been reviewed and updated as appropriate in this visit:        Past Medical History:   Diagnosis Date    Marshall esophagus     High cholesterol     Hypothyroidism     Osteoporosis      Past Surgical History:   Procedure Laterality Date    BLADDER SUSPENSION      CHOLECYSTECTOMY      FEMORAL HERNIA REPAIR Right     bowel dusky but OK - no resection    TOTAL VAGINAL HYSTERECTOMY  1997     Family History   Problem Relation Age of Onset    Colon cancer Biological Mother     Heart disease Biological Father     Atrial fibrillation Biological Brother     Cancer Biological Brother      Social History     Socioeconomic History    Marital status:      Spouse name: Not on file    Number of children: Not on file    Years of education: Not on file    Highest education level: Not on file   Occupational History    Not on file   Tobacco Use    Smoking status: Never    Smokeless tobacco: Never   Vaping Use    Vaping Use: Never used   Substance and Sexual Activity    Alcohol use: No    Drug use: No    Sexual activity: Never   Other Topics Concern    Not on file   Social History Narrative    Not on file     Social Determinants of Health     Financial Resource Strain: Not on file   Food Insecurity: Not on file   Transportation Needs: Not on file   Physical Activity: Not on file   Stress: Not on file   Social Connections: Not on file   Intimate Partner Violence: Not on file   Housing Stability: Not on file       Review of Systems    No Known Allergies  Current Outpatient Medications   Medication Sig Dispense Refill    acetaminophen (TYLENOL) 500 mg tablet Take 500  "mg by mouth every 6 (six) hours as needed for mild pain (Takes PRN only).      albuterol HFA 90 mcg/actuation inhaler Inhale 2 puffs every 4 (four) hours as needed for wheezing. 18 g 0    atorvastatin (LIPITOR) 20 mg tablet Take 1 tablet (20 mg total) by mouth daily. In the evening 90 tablet 3    denosumab (PROLIA) 60 mg/mL syringe Inject 60 mg under the skin once. Injection 2 x yearly      esomeprazole (NexIUM) 40 mg capsule Take 1 capsule (40 mg total) by mouth daily. 90 capsule 0    estradioL (ESTRACE) 0.01 % (0.1 mg/gram) vaginal cream Insert 2 g into the vagina 2 (two) times a week (Mon, Thu).      fluticasone propionate (FLONASE) 50 mcg/actuation nasal spray Administer 1 spray into each nostril daily. 16 g 1    levothyroxine (SYNTHROID) 88 mcg tablet TAKE 1 TABLET BY MOUTH EVERY DAY 90 tablet 3     No current facility-administered medications for this visit.       Objective   Vitals:    10/05/23 1109   BP: 128/84   Pulse: (!) 53   Temp: 36.7 °C (98.1 °F)   SpO2: 98%   Weight: 63 kg (139 lb)   Height: 1.651 m (5' 5\")       Physical Exam    Assessment/Plan   Problem List Items Addressed This Visit    None      Sugra Means MD    10/5/2023  "

## 2023-10-06 PROBLEM — N81.10 VAGINAL PROLAPSE: Status: RESOLVED | Noted: 2019-06-11 | Resolved: 2023-10-06

## 2023-10-06 ASSESSMENT — ENCOUNTER SYMPTOMS
CHEST TIGHTNESS: 0
PALPITATIONS: 0
DIARRHEA: 0
SHORTNESS OF BREATH: 0
LIGHT-HEADEDNESS: 0
CONSTIPATION: 0
ABDOMINAL PAIN: 0

## 2023-10-06 NOTE — PROGRESS NOTES
Subjective      Patient ID: Ktaheryn Goncalves is a 77 y.o. female.    HPI  Ms Goncalves returns for follow up - she had an episode of significant wheezing when exposed to her daughters dogs ( fide santana dogs) - she required albuterol inhaler for relief.  With avoidance she is better. Has know allergy to dogs , horse and cats. No hx anaphylaxis or urticaria.  She otherwise feels well. Is active.No chest pain , palpitations, shortness of breath or lightheadedness.On atorvastatin - due for her FLP.  Follows regularly with gi for her hx Barretts esophagus. On PPI.  On prolia for osteoporosis - following with Dr. Sergio Olivas - bone density done in April no change - follow up in 18 months.Vitamin D level normal in July.     The following have been reviewed and updated as appropriate in this visit:   Allergies  Meds  Problems       Past Medical History:   Diagnosis Date    Marshall esophagus     High cholesterol     Hypothyroidism     Osteoporosis      Past Surgical History:   Procedure Laterality Date    BLADDER SUSPENSION      CHOLECYSTECTOMY      FEMORAL HERNIA REPAIR Right     bowel dusky but OK - no resection    TOTAL VAGINAL HYSTERECTOMY  1997     Family History   Problem Relation Age of Onset    Colon cancer Biological Mother     Heart disease Biological Father     Atrial fibrillation Biological Brother     Cancer Biological Brother      Social History     Socioeconomic History    Marital status:      Spouse name: Not on file    Number of children: Not on file    Years of education: Not on file    Highest education level: Not on file   Occupational History    Not on file   Tobacco Use    Smoking status: Never    Smokeless tobacco: Never   Vaping Use    Vaping Use: Never used   Substance and Sexual Activity    Alcohol use: No    Drug use: No    Sexual activity: Never   Other Topics Concern    Not on file   Social History Narrative    Not on file     Social Determinants of Health  "    Financial Resource Strain: Not on file   Food Insecurity: Not on file   Transportation Needs: Not on file   Physical Activity: Not on file   Stress: Not on file   Social Connections: Not on file   Intimate Partner Violence: Not on file   Housing Stability: Not on file       Review of Systems   Respiratory: Negative for chest tightness and shortness of breath.    Cardiovascular: Negative for chest pain and palpitations.   Gastrointestinal: Negative for abdominal pain, constipation and diarrhea.   Neurological: Negative for light-headedness.       No Known Allergies  Current Outpatient Medications   Medication Sig Dispense Refill    acetaminophen (TYLENOL) 500 mg tablet Take 500 mg by mouth every 6 (six) hours as needed for mild pain (Takes PRN only).      albuterol HFA 90 mcg/actuation inhaler Inhale 2 puffs every 4 (four) hours as needed for wheezing. 18 g 0    atorvastatin (LIPITOR) 20 mg tablet Take 1 tablet (20 mg total) by mouth daily. In the evening 90 tablet 3    denosumab (PROLIA) 60 mg/mL syringe Inject 60 mg under the skin once. Injection 2 x yearly      esomeprazole (NexIUM) 40 mg capsule Take 1 capsule (40 mg total) by mouth daily. 90 capsule 0    estradioL (ESTRACE) 0.01 % (0.1 mg/gram) vaginal cream Insert 2 g into the vagina 2 (two) times a week (Mon, Thu).      fluticasone propionate (FLONASE) 50 mcg/actuation nasal spray Administer 1 spray into each nostril daily. 16 g 1    levothyroxine (SYNTHROID) 88 mcg tablet TAKE 1 TABLET BY MOUTH EVERY DAY 90 tablet 3     No current facility-administered medications for this visit.       Objective   Vitals:    10/05/23 1109   BP: 128/84   Pulse: (!) 53   Temp: 36.7 °C (98.1 °F)   SpO2: 98%   Weight: 63 kg (139 lb)   Height: 1.651 m (5' 5\")       Physical Exam  Vitals and nursing note reviewed.   Constitutional:       Appearance: She is well-developed.   HENT:      Head: Normocephalic and atraumatic.   Neck:      Thyroid: No thyromegaly.      " Vascular: No carotid bruit.   Cardiovascular:      Rate and Rhythm: Normal rate and regular rhythm.      Pulses: Normal pulses.      Heart sounds: Normal heart sounds. No murmur heard.  Pulmonary:      Effort: Pulmonary effort is normal.      Breath sounds: Normal breath sounds.   Abdominal:      General: Bowel sounds are normal.      Palpations: Abdomen is soft. There is no mass.      Tenderness: There is no abdominal tenderness.   Musculoskeletal:      Cervical back: Normal range of motion and neck supple.   Skin:     General: Skin is warm and dry.   Neurological:      Mental Status: She is alert and oriented to person, place, and time.   Psychiatric:         Behavior: Behavior normal.         Thought Content: Thought content normal.         Judgment: Judgment normal.         Assessment/Plan   Problem List Items Addressed This Visit     Barretts esophagus     Continue PPI  Endoscopy due caroune 2024  Dr. Katie Talbert ( 3 year basis)         Hypothyroidism     Clinically and biochemically euthyroid  Continue levothyroxine         Hyperlipidemia - Primary    Relevant Orders    Lipid panel    Medicare annual wellness visit, subsequent     Annual wellness visit performed. All relevant screenings and immunizations discussed, medications reviewed and reconciled. No safety issues. Cognitive and depression screen performed.End of life planning discussed. Other providers reviewed and listed.         History of sacrocolpopexy    Age-related osteoporosis without current pathological fracture     Continue prolia and vitamin D dietary calcium  Bone density 2025 - weight bearing exercise         Reactive airway disease     Back to baseline now with changing her routine and avoiding known allergens - does not feel she wants to see an allergist - will use albuterol inhaler prn- advised flu vaccine, covid booster and RSV vaccine - separate by two weeks for each            Sugar Means MD    10/6/2023

## 2023-10-06 NOTE — PROGRESS NOTES
Subjective     Katheryn Goncalves is a 77 y.o. female who presents for a subsequent annual wellness visit. See office note for discussion of medical problems    Patient Care Team:  Sugar Mathews MD as PCP - General  Rl Roland MD as Urogynecologist (Urogynecology)  Raquel Moore MD as Referring Physician  Mily Ortiz MD as Referring Physician  Mily Ortiz MD as Referring Physician  James Bragg MD as Surgeon (General Surgery)  Anyi Waterman MD as Obstetrician (Obstetrics and Gynecology)    Comprehensive Medical and Social History  Patient Active Problem List   Diagnosis    Irreducible right femoral hernia    Anemia    Barretts esophagus    Esophageal reflux    Hypothyroidism    Vaginal vault prolapse, posthysterectomy    Pre-operative clearance    Abnormal EKG    Hyperlipidemia    Medicare annual wellness visit, subsequent    Calcification of right breast    History of sacrocolpopexy    Age-related osteoporosis without current pathological fracture    COVID-19    Recurrent UTI    Vitamin B12 deficiency    Borderline glaucoma    Nuclear senile cataract    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    Abdominal bloating    Rib pain on left side    Dyslipidemia    Tinnitus of right ear    Vertigo    Left hip pain    Recurrent right femoral hernia    Acute URI    Nocturia    Lightheadedness    Imbalance    Right inguinal hernia    Proteins serum plasma low    Acute cystitis without hematuria    Heat intolerance    Rash    Generalized pruritus    Reactive airway disease     Past Medical History:   Diagnosis Date    Marshall esophagus     High cholesterol     Hypothyroidism     Osteoporosis      Past Surgical History:   Procedure Laterality Date    BLADDER SUSPENSION      CHOLECYSTECTOMY      FEMORAL HERNIA REPAIR Right     bowel dusky but OK - no resection    TOTAL VAGINAL HYSTERECTOMY  1997     No Known Allergies  Current Outpatient  "Medications   Medication Sig Dispense Refill    acetaminophen (TYLENOL) 500 mg tablet Take 500 mg by mouth every 6 (six) hours as needed for mild pain (Takes PRN only).      albuterol HFA 90 mcg/actuation inhaler Inhale 2 puffs every 4 (four) hours as needed for wheezing. 18 g 0    atorvastatin (LIPITOR) 20 mg tablet Take 1 tablet (20 mg total) by mouth daily. In the evening 90 tablet 3    denosumab (PROLIA) 60 mg/mL syringe Inject 60 mg under the skin once. Injection 2 x yearly      esomeprazole (NexIUM) 40 mg capsule Take 1 capsule (40 mg total) by mouth daily. 90 capsule 0    estradioL (ESTRACE) 0.01 % (0.1 mg/gram) vaginal cream Insert 2 g into the vagina 2 (two) times a week (Mon, Thu).      fluticasone propionate (FLONASE) 50 mcg/actuation nasal spray Administer 1 spray into each nostril daily. 16 g 1    levothyroxine (SYNTHROID) 88 mcg tablet TAKE 1 TABLET BY MOUTH EVERY DAY 90 tablet 3     No current facility-administered medications for this visit.     Social History     Tobacco Use    Smoking status: Never    Smokeless tobacco: Never   Vaping Use    Vaping Use: Never used   Substance Use Topics    Alcohol use: No    Drug use: No     Family History   Problem Relation Age of Onset    Colon cancer Biological Mother     Heart disease Biological Father     Atrial fibrillation Biological Brother     Cancer Biological Brother        Objective   Vitals  Vitals:    10/05/23 1109   BP: 128/84   Pulse: (!) 53   Temp: 36.7 °C (98.1 °F)   SpO2: 98%   Weight: 63 kg (139 lb)   Height: 1.651 m (5' 5\")     Body mass index is 23.13 kg/m².    Advanced Care Plan                                        PHQ  Will the patient answer the depression questions?: Yes   Little interest or pleasure in doing things: Not at all   Feeling down, depressed, or hopeless: Not at all   Depression Risk: 0                                             Mini Cog  Score: 5      Get Up and Go  Result: Pass    STEADI Falls Risk  One or " more falls in the last year: No           Has trouble stepping up onto a curb: No   Advised to use a cane or walker to get around safely: No   Often has to rush to the toilet: No   Feels unsteady when walking: No   Has lost some feeling in feet: No   Often feels sad or depressed: No   Steadies self on furniture while walking at home: No   Takes medication that makes him/her feel lightheaded or more tired than usual: No   Worried about falling: No   Takes medicine to sleep or improve mood: No   Needs to push with hands when rising from a chair: No   Falls screen completed: Yes     Hearing and Vision Screening  No results found.  See HRA for relevant hearing screening response.    Assessment/Plan   Diagnoses and all orders for this visit:    Hyperlipidemia, unspecified hyperlipidemia type (Primary)  -     Lipid panel; Future    Mild intermittent reactive airway disease without complication    Marshall's esophagus without dysplasia    Age-related osteoporosis without current pathological fracture    Hypothyroidism, unspecified type    History of sacrocolpopexy    Medicare annual wellness visit, subsequent  Assessment & Plan:  Annual wellness visit performed. All relevant screenings and immunizations discussed, medications reviewed and reconciled. No safety issues. Cognitive and depression screen performed.End of life planning discussed. Other providers reviewed and listed.      Other orders  -     Influenza vaccine Quad Adjuvanted 65 and Older (FluAd Quad)      See Patient Instructions (the written plan) which was given to the patient for PPPS and health risk factors with interventions.

## 2023-10-06 NOTE — ASSESSMENT & PLAN NOTE
Back to baseline now with changing her routine and avoiding known allergens - does not feel she wants to see an allergist - will use albuterol inhaler prn- advised flu vaccine, covid booster and RSV vaccine - separate by two weeks for each

## 2023-11-14 ENCOUNTER — TELEPHONE (OUTPATIENT)
Dept: INTERNAL MEDICINE | Facility: CLINIC | Age: 78
End: 2023-11-14
Payer: MEDICARE

## 2023-11-14 NOTE — TELEPHONE ENCOUNTER
Patient wants to know if she should get covid and rsv shots.  Also, she would like to know which one she should get first.

## 2023-12-01 NOTE — PATIENT INSTRUCTIONS
Obtain shingrix at [facility] a month after covid   at the pharmacy   obtain a mammogram and the urinalysis and urine protein /creatinine ratio  Begin vitamin B12 1000 mcg daily  obtain a vitamin D level check B12 level in two months   Spoke with patient regarding labs needed to be drawn prior to appointment. Indicated the scripts are in the system, they are non-fasting and patient can go to any [facility] facility to have the labs drawn. Patient verbalized understanding, indicated getting labs Thursday morning.

## 2023-12-26 ENCOUNTER — TELEPHONE (OUTPATIENT)
Dept: INTERNAL MEDICINE | Facility: CLINIC | Age: 78
End: 2023-12-26
Payer: MEDICARE

## 2023-12-26 RX ORDER — ALBUTEROL SULFATE 90 UG/1
2 INHALANT RESPIRATORY (INHALATION) EVERY 4 HOURS PRN
Qty: 18 G | Refills: 3 | Status: SHIPPED | OUTPATIENT
Start: 2023-12-26

## 2023-12-26 NOTE — TELEPHONE ENCOUNTER
Patient called and had an allergic reaction from being in a home with a dog. She stated that she always knew she would react to this but this time she said it affected her breathing and she had to use her albuterol inhaler. She wants to know your opinion on this and if you think that she should see an allergist since it is progressively getting worse. She was referred to Dr. Goode at [facility] by a friend. She would also like to know who you would refer her to see.

## 2023-12-26 NOTE — TELEPHONE ENCOUNTER
"Medicine Refill Request    Last Office Visit: 10/5/2023   Last Consult Visit: Visit date not found  Last Telemedicine Visit: 12/14/2022 Jovanna Hardy CRNP    Next Appointment: Visit date not found      Current Outpatient Medications:   •  acetaminophen (TYLENOL) 500 mg tablet, Take 500 mg by mouth every 6 (six) hours as needed for mild pain (Takes PRN only)., Disp: , Rfl:   •  albuterol HFA 90 mcg/actuation inhaler, Inhale 2 puffs every 4 (four) hours as needed for wheezing., Disp: 18 g, Rfl: 0  •  atorvastatin (LIPITOR) 20 mg tablet, Take 1 tablet (20 mg total) by mouth daily. In the evening, Disp: 90 tablet, Rfl: 3  •  denosumab (PROLIA) 60 mg/mL syringe, Inject 60 mg under the skin once. Injection 2 x yearly, Disp: , Rfl:   •  esomeprazole (NexIUM) 40 mg capsule, Take 1 capsule (40 mg total) by mouth daily., Disp: 90 capsule, Rfl: 0  •  estradioL (ESTRACE) 0.01 % (0.1 mg/gram) vaginal cream, Insert 2 g into the vagina 2 (two) times a week (Mon, Thu)., Disp: , Rfl:   •  fluticasone propionate (FLONASE) 50 mcg/actuation nasal spray, Administer 1 spray into each nostril daily., Disp: 16 g, Rfl: 1  •  levothyroxine (SYNTHROID) 88 mcg tablet, TAKE 1 TABLET BY MOUTH EVERY DAY, Disp: 90 tablet, Rfl: 3      BP Readings from Last 3 Encounters:   10/05/23 128/84   09/12/23 126/64   07/27/23 116/76       Recent Lab results:  Lab Results   Component Value Date    CHOL 159 11/03/2022   ,   Lab Results   Component Value Date    HDL 66 11/03/2022   ,   Lab Results   Component Value Date    LDLCALC 83 11/03/2022   ,   Lab Results   Component Value Date    TRIG 50 11/03/2022        Lab Results   Component Value Date    GLUCOSE 86 07/27/2023   , No results found for: \"HGBA1C\"      Lab Results   Component Value Date    CREATININE 0.6 07/27/2023       Lab Results   Component Value Date    TSH 1.13 07/27/2023         No results found for: \"HGBA1C\"    "

## 2023-12-28 ENCOUNTER — TELEPHONE (OUTPATIENT)
Dept: INTERNAL MEDICINE | Facility: CLINIC | Age: 78
End: 2023-12-28
Payer: MEDICARE

## 2023-12-28 NOTE — TELEPHONE ENCOUNTER
Patient called and stated that Dr. Pereira does not taker her secondary insurance. She would like to see someone in [facility]\ if you can referral her.

## 2023-12-29 ENCOUNTER — TELEPHONE (OUTPATIENT)
Dept: INTERNAL MEDICINE | Facility: CLINIC | Age: 78
End: 2023-12-29
Payer: MEDICARE

## 2024-01-22 RX ORDER — LEVOTHYROXINE SODIUM 88 UG/1
TABLET ORAL
Qty: 90 TABLET | Refills: 0 | Status: SHIPPED | OUTPATIENT
Start: 2024-01-22 | End: 2024-03-21 | Stop reason: SDUPTHER

## 2024-02-08 ENCOUNTER — TELEMEDICINE (OUTPATIENT)
Dept: INTERNAL MEDICINE | Facility: CLINIC | Age: 79
End: 2024-02-08
Payer: MEDICARE

## 2024-02-08 DIAGNOSIS — B97.89 ACUTE VIRAL SINUSITIS: Primary | ICD-10-CM

## 2024-02-08 DIAGNOSIS — R09.81 NASAL CONGESTION: ICD-10-CM

## 2024-02-08 DIAGNOSIS — J01.90 ACUTE VIRAL SINUSITIS: Primary | ICD-10-CM

## 2024-02-08 PROBLEM — N99.3 VAGINAL VAULT PROLAPSE, POSTHYSTERECTOMY: Status: RESOLVED | Noted: 2018-08-01 | Resolved: 2024-02-08

## 2024-02-08 PROBLEM — I45.10 RIGHT BUNDLE BRANCH BLOCK: Status: ACTIVE | Noted: 2021-04-08

## 2024-02-08 PROBLEM — K41.30 IRREDUCIBLE RIGHT FEMORAL HERNIA: Status: RESOLVED | Noted: 2018-03-18 | Resolved: 2024-02-08

## 2024-02-08 PROCEDURE — 99213 OFFICE O/P EST LOW 20 MIN: CPT | Mod: 95 | Performed by: NURSE PRACTITIONER

## 2024-02-08 RX ORDER — TRIAMCINOLONE ACETONIDE 1 MG/G
CREAM TOPICAL
COMMUNITY
Start: 2024-01-03 | End: 2024-03-07 | Stop reason: ALTCHOICE

## 2024-02-08 RX ORDER — PREDNISONE 10 MG/1
TABLET ORAL
Qty: 24 TABLET | Refills: 0 | Status: SHIPPED | OUTPATIENT
Start: 2024-02-08 | End: 2024-03-07 | Stop reason: ALTCHOICE

## 2024-02-08 ASSESSMENT — ENCOUNTER SYMPTOMS
SINUS PRESSURE: 0
SORE THROAT: 0
COUGH: 0
SINUS PAIN: 0
SHORTNESS OF BREATH: 0

## 2024-02-08 NOTE — PATIENT INSTRUCTIONS
Patient presentation is significant for viral rhinosinusitis infection due to length of symptoms, no fever, and absence of major signs and symptoms consistent with acute bacterial rhinosinusitis.  Discussed antibiotic use without bacterial etiology and patient agrees to forego any antibiotics today.  Patient advised that if signs and symptoms persist, worsen, or if fever occurs with worsening symptoms within the next few days send MyChart message or call office and will progress treatment.

## 2024-02-08 NOTE — PROGRESS NOTES
Verification of Patient Location:  The patient affirms they are currently located in the following state: [place]    Request for Consent:    Audio and Video Encounter   [place], my name is NOE Manning.  Before we proceed, can you please verify your identification by telling me your full name and date of birth?  Can you tell me who is in the room with you?    You and I are about to have a telemedicine check-in or visit because you have requested it.  This is a live video-conference.  I am a real person, speaking to you in real time.  There is no one else with me on the video-conference. I am not recording this conversation and I am asking you not to record it.  This telemedicine visit will be billed to your health insurance or you, if you are self-insured.  You understand you will be responsible for any copayments or coinsurances that apply to your telemedicine visit.  Communication platform used for this encounter:  [employer] Video Visit (Epic Video Client)       Before starting our telemedicine visit, I am required to get your consent for this virtual check-in or visit by telemedicine. Do you consent?      Patient Response to Request for Consent:  Yes      Visit Documentation:  Subjective     Patient ID: Katheryn Goncalves is a 78 y.o. female.  1945      Started with cold symptoms last Wed. States is having a lot of congestion. Has been taking nasal decongestant, some relief. States mucus from nose is slightly yellow. C/o cough, not productive.      The following have been reviewed and updated as appropriate in this visit:   Meds       Review of Systems   HENT: Positive for congestion. Negative for postnasal drip, sinus pressure, sinus pain and sore throat.    Respiratory: Negative for cough and shortness of breath.          Assessment/Plan   Diagnoses and all orders for this visit:    Acute viral sinusitis (Primary)  -     predniSONE (DELTASONE) 10 mg tablet; Take 5 tablets (50 mg total) by mouth  daily for 2 days, THEN 4 tablets (40 mg total) daily for 2 days, THEN 3 tablets (30 mg total) daily for 1 day, THEN 2 tablets (20 mg total) daily for 1 day, THEN 1 tablet (10 mg total) daily for 1 day.    Nasal congestion  -     predniSONE (DELTASONE) 10 mg tablet; Take 5 tablets (50 mg total) by mouth daily for 2 days, THEN 4 tablets (40 mg total) daily for 2 days, THEN 3 tablets (30 mg total) daily for 1 day, THEN 2 tablets (20 mg total) daily for 1 day, THEN 1 tablet (10 mg total) daily for 1 day.      Patient Instructions   Patient presentation is significant for viral rhinosinusitis infection due to length of symptoms, no fever, and absence of major signs and symptoms consistent with acute bacterial rhinosinusitis.  Discussed antibiotic use without bacterial etiology and patient agrees to forego any antibiotics today.  Patient advised that if signs and symptoms persist, worsen, or if fever occurs with worsening symptoms within the next few days send [employer] message or call office and will progress treatment.      Time Spent:  I spent 10 minutes on this date of service performing the following activities: obtaining history, performing examination, entering orders, documenting, preparing for visit and providing counseling and education.

## 2024-03-07 ENCOUNTER — OFFICE VISIT (OUTPATIENT)
Dept: INTERNAL MEDICINE | Facility: CLINIC | Age: 79
End: 2024-03-07
Payer: MEDICARE

## 2024-03-07 VITALS
SYSTOLIC BLOOD PRESSURE: 124 MMHG | RESPIRATION RATE: 18 BRPM | DIASTOLIC BLOOD PRESSURE: 80 MMHG | BODY MASS INDEX: 22.8 KG/M2 | OXYGEN SATURATION: 98 % | WEIGHT: 137 LBS | HEART RATE: 61 BPM

## 2024-03-07 DIAGNOSIS — K59.01 SLOW TRANSIT CONSTIPATION: ICD-10-CM

## 2024-03-07 DIAGNOSIS — J45.990 EXERCISE-INDUCED ASTHMA: Primary | ICD-10-CM

## 2024-03-07 DIAGNOSIS — M81.0 AGE-RELATED OSTEOPOROSIS WITHOUT CURRENT PATHOLOGICAL FRACTURE: ICD-10-CM

## 2024-03-07 DIAGNOSIS — Z00.00 HEALTHCARE MAINTENANCE: ICD-10-CM

## 2024-03-07 DIAGNOSIS — E03.9 HYPOTHYROIDISM, UNSPECIFIED TYPE: ICD-10-CM

## 2024-03-07 DIAGNOSIS — E78.5 HYPERLIPIDEMIA, UNSPECIFIED HYPERLIPIDEMIA TYPE: ICD-10-CM

## 2024-03-07 DIAGNOSIS — K40.90 RIGHT INGUINAL HERNIA: ICD-10-CM

## 2024-03-07 DIAGNOSIS — H91.91 HEARING LOSS OF RIGHT EAR, UNSPECIFIED HEARING LOSS TYPE: ICD-10-CM

## 2024-03-07 DIAGNOSIS — G47.9 TROUBLE IN SLEEPING: ICD-10-CM

## 2024-03-07 DIAGNOSIS — E53.8 VITAMIN B12 DEFICIENCY: ICD-10-CM

## 2024-03-07 DIAGNOSIS — Z98.890 HISTORY OF SACROCOLPOPEXY: ICD-10-CM

## 2024-03-07 DIAGNOSIS — K22.70 BARRETT'S ESOPHAGUS WITHOUT DYSPLASIA: ICD-10-CM

## 2024-03-07 DIAGNOSIS — H40.009 BORDERLINE GLAUCOMA, UNSPECIFIED LATERALITY: ICD-10-CM

## 2024-03-07 DIAGNOSIS — N39.0 RECURRENT UTI: ICD-10-CM

## 2024-03-07 DIAGNOSIS — Z12.31 BREAST CANCER SCREENING BY MAMMOGRAM: ICD-10-CM

## 2024-03-07 DIAGNOSIS — R35.1 NOCTURIA: ICD-10-CM

## 2024-03-07 PROBLEM — J06.9 ACUTE URI: Status: RESOLVED | Noted: 2021-12-14 | Resolved: 2024-03-07

## 2024-03-07 PROBLEM — R21 RASH: Status: RESOLVED | Noted: 2023-07-28 | Resolved: 2024-03-07

## 2024-03-07 PROBLEM — R14.0 ABDOMINAL BLOATING: Status: RESOLVED | Noted: 2020-07-23 | Resolved: 2024-03-07

## 2024-03-07 PROBLEM — L29.9 GENERALIZED PRURITUS: Status: RESOLVED | Noted: 2023-07-28 | Resolved: 2024-03-07

## 2024-03-07 PROBLEM — N30.00 ACUTE CYSTITIS WITHOUT HEMATURIA: Status: RESOLVED | Noted: 2022-09-28 | Resolved: 2024-03-07

## 2024-03-07 PROBLEM — R68.89 HEAT INTOLERANCE: Status: RESOLVED | Noted: 2022-09-28 | Resolved: 2024-03-07

## 2024-03-07 PROBLEM — U07.1 COVID-19: Status: RESOLVED | Noted: 2020-05-04 | Resolved: 2024-03-07

## 2024-03-07 PROBLEM — K41.91: Status: RESOLVED | Noted: 2021-06-22 | Resolved: 2024-03-07

## 2024-03-07 PROBLEM — R42 LIGHTHEADEDNESS: Status: RESOLVED | Noted: 2022-04-26 | Resolved: 2024-03-07

## 2024-03-07 PROBLEM — R07.81 RIB PAIN ON LEFT SIDE: Status: RESOLVED | Noted: 2020-11-16 | Resolved: 2024-03-07

## 2024-03-07 PROBLEM — M25.552 LEFT HIP PAIN: Status: RESOLVED | Noted: 2021-06-22 | Resolved: 2024-03-07

## 2024-03-07 PROBLEM — E88.09 PROTEINS SERUM PLASMA LOW: Status: RESOLVED | Noted: 2022-05-24 | Resolved: 2024-03-07

## 2024-03-07 PROCEDURE — 99214 OFFICE O/P EST MOD 30 MIN: CPT | Performed by: FAMILY MEDICINE

## 2024-03-07 RX ORDER — DOCUSATE SODIUM 100 MG/1
100 CAPSULE, LIQUID FILLED ORAL DAILY
COMMUNITY

## 2024-03-07 NOTE — PROGRESS NOTES
Subjective: Katheryn Goncalves (1945) is a 78 y.o. year old female.  HPI    Patient is here for follow up of chronic medical conditions.    Here to establish care.    Allergy induced asthma - would like to avoid the recommended inhaler by allergists due to cost. Plans to do albuterol instead. Triggered by dogs. Not planning on taking allergy shots. Using claritin as needed. Plans to avoid dogs.    Right ear hearing loss - stable without hearing aid.    barretts - on nexium.    No recent falls.    Constipation - takes colace and prune juice daily.    Wouldn't be surprised if she has sleep apnea. Never been tested. Wouldn't want to wear the equipment.    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 07/23/2020   • Recurrent UTI 05/27/2020   • Calcification of right breast 08/23/2019   • History of sacrocolpopexy 08/23/2019   • Age-related osteoporosis without current pathological fracture 08/23/2019   • Abnormal EKG 06/06/2019   • Hyperlipidemia 06/06/2019   • Esophageal reflux 09/22/2017   • Hypothyroidism 09/22/2017   • Barretts esophagus 10/22/2014     Past Medical History:   Diagnosis Date   • Abdominal bloating 7/23/2020   • Acute cystitis without hematuria 9/28/2022   • Acute URI 12/14/2021   • Marshall esophagus    • COVID-19 5/4/2020   • Generalized pruritus 7/28/2023   • Heat intolerance 9/28/2022   • High cholesterol    • Hypothyroidism    • Left hip pain 6/22/2021   •  Lightheadedness 4/26/2022   • Osteoporosis    • Proteins serum plasma low 5/24/2022   • Rash 7/28/2023   • Recurrent right femoral hernia 6/22/2021   • Rib pain on left side 11/16/2020     Past Surgical History:   Procedure Laterality Date   • BLADDER SUSPENSION     • CHOLECYSTECTOMY     • FEMORAL HERNIA REPAIR Right     bowel dusky but OK - no resection   • TOTAL VAGINAL HYSTERECTOMY  1997     Family History   Problem Relation Age of Onset   • Colon cancer Biological Mother    • Heart disease Biological Father    • Atrial fibrillation Biological Brother    • Cancer Biological Brother      Social History     Socioeconomic History   • Marital status:      Spouse name: None   • Number of children: None   • Years of education: None   • Highest education level: None   Tobacco Use   • Smoking status: Never   • Smokeless tobacco: Never   Vaping Use   • Vaping Use: Never used   Substance and Sexual Activity   • Alcohol use: No   • Drug use: No   • Sexual activity: Never     No Known Allergies    Outpatient Encounter Medications as of 3/7/2024:   •  acetaminophen (TYLENOL) 500 mg tablet, Take 500 mg by mouth every 6 (six) hours as needed for mild pain (Takes PRN only).  •  albuterol HFA 90 mcg/actuation inhaler, Inhale 2 puffs every 4 (four) hours as needed for wheezing.  •  atorvastatin (LIPITOR) 20 mg tablet, Take 1 tablet (20 mg total) by mouth daily. In the evening  •  denosumab (PROLIA) 60 mg/mL syringe, Inject 60 mg under the skin once. Injection 2 x yearly  •  docusate sodium (COLACE) 100 mg capsule, Take 100 mg by mouth daily.  •  esomeprazole (NexIUM) 40 mg capsule, Take 1 capsule (40 mg total) by mouth daily.  •  estradioL (ESTRACE) 0.01 % (0.1 mg/gram) vaginal cream, Insert 2 g into the vagina 2 (two) times a week (Mon, Thu).  •  fluticasone propionate (FLONASE) 50 mcg/actuation nasal spray, Administer 1 spray into each nostril daily.  •  levothyroxine (SYNTHROID) 88 mcg tablet, TAKE 1 TABLET BY MOUTH  EVERY DAY  •  [DISCONTINUED] predniSONE (DELTASONE) 10 mg tablet, Take 5 tablets (50 mg total) by mouth daily for 2 days, THEN 4 tablets (40 mg total) daily for 2 days, THEN 3 tablets (30 mg total) daily for 1 day, THEN 2 tablets (20 mg total) daily for 1 day, THEN 1 tablet (10 mg total) daily for 1 day. (Patient not taking: Reported on 3/7/2024)  •  [DISCONTINUED] triamcinolone (KENALOG) 0.1 % cream, APPLY 1 APPLICATION EXTERNALLY TWICE A DAY    Review of Systems  All other systems reviewed and are negative.    Objective:  Vitals:    03/07/24 1308   BP: 124/80   Pulse: 61   Resp: 18   SpO2: 98%     BP Readings from Last 3 Encounters:   03/07/24 124/80   10/05/23 128/84   09/12/23 126/64       Wt Readings from Last 3 Encounters:   03/07/24 62.1 kg (137 lb)   10/05/23 63 kg (139 lb)   09/12/23 63.2 kg (139 lb 6.4 oz)     Body mass index is 22.8 kg/m².    Physical Exam  Vitals reviewed.   Constitutional:       General: She is not in acute distress.     Appearance: She is well-developed. She is not diaphoretic.   HENT:      Head: Normocephalic and atraumatic.   Eyes:      General: No scleral icterus.        Right eye: No discharge.         Left eye: No discharge.      Conjunctiva/sclera: Conjunctivae normal.   Neck:      Thyroid: No thyromegaly.   Cardiovascular:      Rate and Rhythm: Normal rate and regular rhythm.      Heart sounds: Normal heart sounds. No murmur heard.     No friction rub. No gallop.   Pulmonary:      Effort: Pulmonary effort is normal. No respiratory distress.      Breath sounds: Normal breath sounds. No wheezing or rales.   Abdominal:      General: Bowel sounds are normal. There is no distension.      Palpations: Abdomen is soft.      Tenderness: There is no abdominal tenderness. There is no guarding.   Musculoskeletal:      Cervical back: Neck supple.      Right lower leg: No edema.      Left lower leg: No edema.   Lymphadenopathy:      Cervical: No cervical adenopathy.   Skin:     General: Skin  is warm and dry.   Neurological:      Mental Status: She is alert and oriented to person, place, and time.   Psychiatric:         Mood and Affect: Mood normal.         Assessment/Plan: Katheryn Goncalves is a 78 y.o. female presenting for   Chief Complaint   Patient presents with   • Establish Care   .    Problem List Items Addressed This Visit     Vitamin B12 deficiency    Overview     Stable.         Relevant Orders    CBC    Comprehensive metabolic panel    TSH 3rd Generation    Vitamin D 25 hydroxy    Lipid panel    Trouble in sleeping    Overview     Chronic, does feel rested when she wakes.    Wouldn't be surprised if she has sleep apnea. Never been tested. Wouldn't want to wear the equipment.    Since her 50s (when her  passed), hasn't slept great - waking overnight, and sometimes not going back to sleep.         Relevant Orders    CBC    Comprehensive metabolic panel    TSH 3rd Generation    Vitamin D 25 hydroxy    Lipid panel    Slow transit constipation    Overview     Stable. On colace and prune juice daily.         Relevant Orders    CBC    Comprehensive metabolic panel    TSH 3rd Generation    Vitamin D 25 hydroxy    Lipid panel    Right inguinal hernia    Overview     Status post repair x2.         Relevant Orders    CBC    Comprehensive metabolic panel    TSH 3rd Generation    Vitamin D 25 hydroxy    Lipid panel    Recurrent UTI    Overview     After hysterectomy. Continues on estrace cream 1-2 weeks to prevent recurrent UTIs.         Relevant Orders    CBC    Comprehensive metabolic panel    TSH 3rd Generation    Vitamin D 25 hydroxy    Lipid panel    Nocturia    Overview     Wakes three times overnight to void.         Relevant Orders    CBC    Comprehensive metabolic panel    TSH 3rd Generation    Vitamin D 25 hydroxy    Lipid panel    Hypothyroidism    Overview     Stable on levothyroxine.  Diagnosis around 2005 (symptom was fatigue).          Relevant Orders    CBC    Comprehensive metabolic  panel    TSH 3rd Generation    Vitamin D 25 hydroxy    Lipid panel    Hyperlipidemia    Overview     On statin.         Relevant Orders    CBC    Comprehensive metabolic panel    TSH 3rd Generation    Vitamin D 25 hydroxy    Lipid panel    History of sacrocolpopexy    Overview     Status post Robotic assisted laparoscopic sacrocolpopexy, retropubic mid urethral sling posterior vaginal repair 6/10/2019 Dr. Raquel Faust at [facility].         Relevant Orders    CBC    Comprehensive metabolic panel    TSH 3rd Generation    Vitamin D 25 hydroxy    Lipid panel    Hearing loss of right ear    Overview     Doesn't wear hearing aids because they don't help with the things she needs (eg directionality). Hearing loss in right ear.         Relevant Orders    CBC    Comprehensive metabolic panel    TSH 3rd Generation    Vitamin D 25 hydroxy    Lipid panel    Healthcare maintenance    Overview     Follows with gastrointestinal specialist for routine endoscopy and colonoscopies.  Family history of renal cancer (father) and colon cancer (mother).  Continue annual mammogram.         Relevant Orders    CBC    Comprehensive metabolic panel    TSH 3rd Generation    Vitamin D 25 hydroxy    Lipid panel    Exercise-induced asthma - Primary    Overview     Management per allergist.  Allergy induced asthma - would like to avoid the recommended inhaler by allergists due to cost. Plans to do albuterol instead. Triggered by dogs. Not planning on taking allergy shots. Using claritin as needed. Plans to avoid dogs.         Relevant Orders    CBC    Comprehensive metabolic panel    TSH 3rd Generation    Vitamin D 25 hydroxy    Lipid panel    Borderline glaucoma    Overview     Management per ophthalmology.         Relevant Orders    CBC    Comprehensive metabolic panel    TSH 3rd Generation    Vitamin D 25 hydroxy    Lipid panel    Barretts esophagus    Overview     Continues on nexium which has controlled the Barretts. Follows  with Dr. Talbert, continue with routine endoscopies.         Relevant Orders    CBC    Comprehensive metabolic panel    TSH 3rd Generation    Vitamin D 25 hydroxy    Lipid panel    Age-related osteoporosis without current pathological fracture    Overview     Management per Dr. Olivas. Continues on Prolia.         Relevant Orders    CBC    Comprehensive metabolic panel    TSH 3rd Generation    Vitamin D 25 hydroxy    Lipid panel   Other Visit Diagnoses     Breast cancer screening by mammogram        Relevant Orders    BI SCREENING MAMMOGRAM BILATERAL(TOMOSYNTHESIS)    CBC    Comprehensive metabolic panel    TSH 3rd Generation    Vitamin D 25 hydroxy    Lipid panel          Orders Placed This Encounter   Procedures   • BI SCREENING MAMMOGRAM BILATERAL(TOMOSYNTHESIS)     Standing Status:   Future     Standing Expiration Date:   5/7/2025     Order Specific Question:   Release to patient     Answer:   Immediate [1]   • CBC     Standing Status:   Future     Standing Expiration Date:   3/7/2025     Order Specific Question:   Release to patient     Answer:   Immediate [1]   • Comprehensive metabolic panel     Standing Status:   Future     Standing Expiration Date:   3/7/2025     Order Specific Question:   Release to patient     Answer:   Immediate [1]   • TSH 3rd Generation     Standing Status:   Future     Standing Expiration Date:   3/7/2025     Order Specific Question:   Release to patient     Answer:   Immediate [1]   • Vitamin D 25 hydroxy     Standing Status:   Future     Standing Expiration Date:   3/7/2025     Order Specific Question:   Release to patient     Answer:   Immediate [1]   • Lipid panel     Standing Status:   Future     Standing Expiration Date:   3/7/2025     Order Specific Question:   Release to patient     Answer:   Immediate [1]       There are no Patient Instructions on file for this visit.    Follow up in 6 months for routine labs, mammogram review if after September.       Medication List           Accurate as of March 7, 2024  2:02 PM. If you have any questions, ask your nurse or doctor.            CONTINUE taking these medications    acetaminophen 500 mg tablet  Commonly known as: TYLENOL  Take 500 mg by mouth every 6 (six) hours as needed for mild pain (Takes PRN only).  Dose: 500 mg     albuterol HFA 90 mcg/actuation inhaler  Inhale 2 puffs every 4 (four) hours as needed for wheezing.  Dose: 2 puff     atorvastatin 20 mg tablet  Commonly known as: LIPITOR  Take 1 tablet (20 mg total) by mouth daily. In the evening  Dose: 20 mg     denosumab 60 mg/mL syringe  Commonly known as: PROLIA  Inject 60 mg under the skin once. Injection 2 x yearly  Dose: 60 mg     docusate sodium 100 mg capsule  Commonly known as: COLACE  Take 100 mg by mouth daily.  Dose: 100 mg     esomeprazole 40 mg capsule  Commonly known as: NexIUM  Take 1 capsule (40 mg total) by mouth daily.  Dose: 40 mg     estradioL 0.01 % (0.1 mg/gram) vaginal cream  Commonly known as: ESTRACE  Insert 2 g into the vagina 2 (two) times a week (Mon, Thu).  Dose: 2 g     fluticasone propionate 50 mcg/actuation nasal spray  Commonly known as: FLONASE  Administer 1 spray into each nostril daily.  Dose: 1 spray     levothyroxine 88 mcg tablet  Commonly known as: SYNTHROID  TAKE 1 TABLET BY MOUTH EVERY DAY

## 2024-03-21 RX ORDER — LEVOTHYROXINE SODIUM 88 UG/1
88 TABLET ORAL DAILY
Qty: 90 TABLET | Refills: 0 | Status: SHIPPED | OUTPATIENT
Start: 2024-03-21 | End: 2024-07-16

## 2024-03-21 RX ORDER — ATORVASTATIN CALCIUM 20 MG/1
20 TABLET, FILM COATED ORAL DAILY
Qty: 90 TABLET | Refills: 3 | Status: SHIPPED | OUTPATIENT
Start: 2024-03-21

## 2024-03-21 NOTE — TELEPHONE ENCOUNTER
"Medicine Refill Request    Last Office Visit: 3/7/2024   Last Consult Visit: Visit date not found  Last Telemedicine Visit: Visit date not found    Next Appointment: 9/10/2024      Current Outpatient Medications:   •  acetaminophen (TYLENOL) 500 mg tablet, Take 500 mg by mouth every 6 (six) hours as needed for mild pain (Takes PRN only)., Disp: , Rfl:   •  albuterol HFA 90 mcg/actuation inhaler, Inhale 2 puffs every 4 (four) hours as needed for wheezing., Disp: 18 g, Rfl: 3  •  atorvastatin (LIPITOR) 20 mg tablet, Take 1 tablet (20 mg total) by mouth daily. In the evening, Disp: 90 tablet, Rfl: 3  •  denosumab (PROLIA) 60 mg/mL syringe, Inject 60 mg under the skin once. Injection 2 x yearly, Disp: , Rfl:   •  docusate sodium (COLACE) 100 mg capsule, Take 100 mg by mouth daily., Disp: , Rfl:   •  esomeprazole (NexIUM) 40 mg capsule, Take 1 capsule (40 mg total) by mouth daily., Disp: 90 capsule, Rfl: 0  •  estradioL (ESTRACE) 0.01 % (0.1 mg/gram) vaginal cream, Insert 2 g into the vagina 2 (two) times a week (Mon, Thu)., Disp: , Rfl:   •  fluticasone propionate (FLONASE) 50 mcg/actuation nasal spray, Administer 1 spray into each nostril daily., Disp: 16 g, Rfl: 1  •  levothyroxine (SYNTHROID) 88 mcg tablet, TAKE 1 TABLET BY MOUTH EVERY DAY, Disp: 90 tablet, Rfl: 0      BP Readings from Last 3 Encounters:   03/07/24 124/80   10/05/23 128/84   09/12/23 126/64       Recent Lab results:  Lab Results   Component Value Date    CHOL 159 11/03/2022   ,   Lab Results   Component Value Date    HDL 66 11/03/2022   ,   Lab Results   Component Value Date    LDLCALC 83 11/03/2022   ,   Lab Results   Component Value Date    TRIG 50 11/03/2022        Lab Results   Component Value Date    GLUCOSE 86 07/27/2023   , No results found for: \"HGBA1C\"      Lab Results   Component Value Date    CREATININE 0.6 07/27/2023       Lab Results   Component Value Date    TSH 1.13 07/27/2023         No results found for: \"HGBA1C\"  "

## 2024-04-17 ENCOUNTER — APPOINTMENT (OUTPATIENT)
Dept: LAB | Facility: CLINIC | Age: 79
End: 2024-04-17
Attending: FAMILY MEDICINE
Payer: MEDICARE

## 2024-04-17 DIAGNOSIS — N39.0 RECURRENT UTI: ICD-10-CM

## 2024-04-17 DIAGNOSIS — M81.0 AGE-RELATED OSTEOPOROSIS WITHOUT CURRENT PATHOLOGICAL FRACTURE: ICD-10-CM

## 2024-04-17 DIAGNOSIS — K22.70 BARRETT'S ESOPHAGUS WITHOUT DYSPLASIA: ICD-10-CM

## 2024-04-17 DIAGNOSIS — K40.90 RIGHT INGUINAL HERNIA: ICD-10-CM

## 2024-04-17 DIAGNOSIS — E53.8 VITAMIN B12 DEFICIENCY: ICD-10-CM

## 2024-04-17 DIAGNOSIS — H91.91 HEARING LOSS OF RIGHT EAR, UNSPECIFIED HEARING LOSS TYPE: ICD-10-CM

## 2024-04-17 DIAGNOSIS — Z98.890 HISTORY OF SACROCOLPOPEXY: ICD-10-CM

## 2024-04-17 DIAGNOSIS — E03.9 HYPOTHYROIDISM, UNSPECIFIED TYPE: ICD-10-CM

## 2024-04-17 DIAGNOSIS — Z12.31 BREAST CANCER SCREENING BY MAMMOGRAM: ICD-10-CM

## 2024-04-17 DIAGNOSIS — Z00.00 HEALTHCARE MAINTENANCE: ICD-10-CM

## 2024-04-17 DIAGNOSIS — R35.1 NOCTURIA: ICD-10-CM

## 2024-04-17 DIAGNOSIS — J45.990 EXERCISE-INDUCED ASTHMA: ICD-10-CM

## 2024-04-17 DIAGNOSIS — H40.009 BORDERLINE GLAUCOMA, UNSPECIFIED LATERALITY: ICD-10-CM

## 2024-04-17 DIAGNOSIS — E78.5 HYPERLIPIDEMIA, UNSPECIFIED HYPERLIPIDEMIA TYPE: ICD-10-CM

## 2024-04-17 DIAGNOSIS — K59.01 SLOW TRANSIT CONSTIPATION: ICD-10-CM

## 2024-04-17 DIAGNOSIS — G47.9 TROUBLE IN SLEEPING: ICD-10-CM

## 2024-04-17 LAB
25(OH)D3 SERPL-MCNC: 58 NG/ML (ref 30–100)
ALBUMIN SERPL-MCNC: 4.4 G/DL (ref 3.5–5.7)
ALP SERPL-CCNC: 58 IU/L (ref 34–125)
ALT SERPL-CCNC: 16 IU/L (ref 7–52)
ANION GAP SERPL CALC-SCNC: 6 MEQ/L (ref 3–15)
AST SERPL-CCNC: 19 IU/L (ref 13–39)
BILIRUB SERPL-MCNC: 0.4 MG/DL (ref 0.3–1.2)
BUN SERPL-MCNC: 11 MG/DL (ref 7–25)
CALCIUM SERPL-MCNC: 9.2 MG/DL (ref 8.6–10.3)
CHLORIDE SERPL-SCNC: 104 MEQ/L (ref 98–107)
CHOLEST SERPL-MCNC: 153 MG/DL
CO2 SERPL-SCNC: 29 MEQ/L (ref 21–31)
CREAT SERPL-MCNC: 0.6 MG/DL (ref 0.6–1.2)
EGFRCR SERPLBLD CKD-EPI 2021: >60 ML/MIN/1.73M*2
ERYTHROCYTE [DISTWIDTH] IN BLOOD BY AUTOMATED COUNT: 12.2 % (ref 11.7–14.4)
GLUCOSE SERPL-MCNC: 93 MG/DL (ref 70–99)
HCT VFR BLD AUTO: 39.8 % (ref 35–45)
HDLC SERPL-MCNC: 67 MG/DL
HDLC SERPL: 2.3 {RATIO}
HGB BLD-MCNC: 13.7 G/DL (ref 11.8–15.7)
LDLC SERPL CALC-MCNC: 76 MG/DL
MCH RBC QN AUTO: 34.3 PG (ref 28–33.2)
MCHC RBC AUTO-ENTMCNC: 34.4 G/DL (ref 32.2–35.5)
MCV RBC AUTO: 99.7 FL (ref 83–98)
NONHDLC SERPL-MCNC: 86 MG/DL
PDW BLD AUTO: 9.6 FL (ref 9.4–12.3)
PLATELET # BLD AUTO: 195 K/UL (ref 150–369)
POTASSIUM SERPL-SCNC: 4.3 MEQ/L (ref 3.5–5.1)
PROT SERPL-MCNC: 6.4 G/DL (ref 6–8.2)
RBC # BLD AUTO: 3.99 M/UL (ref 3.93–5.22)
SODIUM SERPL-SCNC: 139 MEQ/L (ref 136–145)
TRIGL SERPL-MCNC: 48 MG/DL
TSH SERPL DL<=0.05 MIU/L-ACNC: 0.65 MIU/L (ref 0.34–5.6)
WBC # BLD AUTO: 5.2 K/UL (ref 3.8–10.5)

## 2024-04-17 PROCEDURE — 36415 COLL VENOUS BLD VENIPUNCTURE: CPT

## 2024-04-17 PROCEDURE — 80061 LIPID PANEL: CPT

## 2024-04-17 PROCEDURE — 84443 ASSAY THYROID STIM HORMONE: CPT

## 2024-04-17 PROCEDURE — 85027 COMPLETE CBC AUTOMATED: CPT

## 2024-04-17 PROCEDURE — 80053 COMPREHEN METABOLIC PANEL: CPT

## 2024-04-17 PROCEDURE — 82306 VITAMIN D 25 HYDROXY: CPT

## 2024-06-24 ENCOUNTER — APPOINTMENT (OUTPATIENT)
Dept: OPHTHALMOLOGY | Facility: HOSPITAL | Age: 79
End: 2024-06-24
Attending: OPHTHALMOLOGY
Payer: MEDICARE

## 2024-06-24 ENCOUNTER — TRANSCRIBE ORDERS (OUTPATIENT)
Dept: OPHTHALMOLOGY | Facility: HOSPITAL | Age: 79
End: 2024-06-24

## 2024-06-24 DIAGNOSIS — H40.053 BILATERAL OCULAR HYPERTENSION: Primary | ICD-10-CM

## 2024-06-24 PROCEDURE — 92133 CPTRZD OPH DX IMG PST SGM ON: CPT

## 2024-07-16 RX ORDER — LEVOTHYROXINE SODIUM 88 UG/1
88 TABLET ORAL DAILY
Qty: 90 TABLET | Refills: 3 | OUTPATIENT
Start: 2024-07-16

## 2024-07-16 RX ORDER — LEVOTHYROXINE SODIUM 88 UG/1
88 TABLET ORAL DAILY
Qty: 90 TABLET | Refills: 3 | Status: SHIPPED | OUTPATIENT
Start: 2024-07-16

## 2024-07-16 NOTE — TELEPHONE ENCOUNTER
"Medicine Refill Request    Last Office Visit: 3/7/2024   Last Consult Visit: Visit date not found  Last Telemedicine Visit: Visit date not found    Next Appointment: 9/10/2024      Current Outpatient Medications:     atorvastatin (LIPITOR) 20 mg tablet, Take 1 tablet (20 mg total) by mouth daily. In the evening, Disp: 90 tablet, Rfl: 3    levothyroxine (SYNTHROID) 88 mcg tablet, Take 1 tablet (88 mcg total) by mouth daily., Disp: 90 tablet, Rfl: 0    acetaminophen (TYLENOL) 500 mg tablet, Take 500 mg by mouth every 6 (six) hours as needed for mild pain (Takes PRN only)., Disp: , Rfl:     albuterol HFA 90 mcg/actuation inhaler, Inhale 2 puffs every 4 (four) hours as needed for wheezing., Disp: 18 g, Rfl: 3    denosumab (PROLIA) 60 mg/mL syringe, Inject 60 mg under the skin once. Injection 2 x yearly, Disp: , Rfl:     docusate sodium (COLACE) 100 mg capsule, Take 100 mg by mouth daily., Disp: , Rfl:     esomeprazole (NexIUM) 40 mg capsule, Take 1 capsule (40 mg total) by mouth daily., Disp: 90 capsule, Rfl: 0    estradioL (ESTRACE) 0.01 % (0.1 mg/gram) vaginal cream, Insert 2 g into the vagina 2 (two) times a week (Mon, Thu)., Disp: , Rfl:     fluticasone propionate (FLONASE) 50 mcg/actuation nasal spray, Administer 1 spray into each nostril daily., Disp: 16 g, Rfl: 1      BP Readings from Last 3 Encounters:   03/07/24 124/80   10/05/23 128/84   09/12/23 126/64       Recent Lab results:  Lab Results   Component Value Date    CHOL 153 04/17/2024   ,   Lab Results   Component Value Date    HDL 67 04/17/2024   ,   Lab Results   Component Value Date    LDLCALC 76 04/17/2024   ,   Lab Results   Component Value Date    TRIG 48 04/17/2024        Lab Results   Component Value Date    GLUCOSE 93 04/17/2024   , No results found for: \"HGBA1C\"      Lab Results   Component Value Date    CREATININE 0.6 04/17/2024       Lab Results   Component Value Date    TSH 0.65 04/17/2024         No results found for: \"HGBA1C\"  "

## 2024-08-20 ENCOUNTER — OFFICE VISIT (OUTPATIENT)
Dept: INTERNAL MEDICINE | Facility: CLINIC | Age: 79
End: 2024-08-20
Payer: MEDICARE

## 2024-08-20 VITALS
WEIGHT: 144.6 LBS | HEIGHT: 65 IN | BODY MASS INDEX: 24.09 KG/M2 | DIASTOLIC BLOOD PRESSURE: 80 MMHG | HEART RATE: 60 BPM | OXYGEN SATURATION: 97 % | SYSTOLIC BLOOD PRESSURE: 125 MMHG | RESPIRATION RATE: 18 BRPM

## 2024-08-20 DIAGNOSIS — R10.2 ACUTE PAIN IN FEMALE PELVIS: Primary | ICD-10-CM

## 2024-08-20 PROCEDURE — 99214 OFFICE O/P EST MOD 30 MIN: CPT | Performed by: FAMILY MEDICINE

## 2024-08-20 NOTE — PROGRESS NOTES
Subjective: Katheryn Goncalves (1945) is a 78 y.o. year old female.  Chief Complaint   Patient presents with    Hip Pain     Patient is here for an acute concern of left pelvic bone tenderness.    HPI  Tenderness of left ASIS started around 2 months ago. No acute heavy lifting or trauma, but does favor her right side due to history of right inguinal hernia repair. No fever or chills. No nausea or vomiting. Chronic constipation currently controlled and doesn't seem related to these symptoms. Triggered by laying on her left side.    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 07/23/2020    Recurrent UTI 05/27/2020    Calcification of right breast 08/23/2019    History of sacrocolpopexy 08/23/2019    Age-related osteoporosis without current pathological fracture 08/23/2019    Abnormal EKG 06/06/2019    Hyperlipidemia 06/06/2019    Esophageal reflux 09/22/2017    Hypothyroidism 09/22/2017    Barretts esophagus 10/22/2014     Past Medical History:   Diagnosis Date    Abdominal bloating 7/23/2020    Acute cystitis without hematuria 9/28/2022    Acute URI 12/14/2021    Marshall esophagus     COVID-19 5/4/2020    Generalized pruritus 7/28/2023    Heat intolerance 9/28/2022    High cholesterol     Hypothyroidism     Left hip pain 6/22/2021    Lightheadedness 4/26/2022    Osteoporosis     Proteins serum plasma low 5/24/2022    Rash 7/28/2023    Recurrent right  femoral hernia 6/22/2021    Rib pain on left side 11/16/2020     Past Surgical History:   Procedure Laterality Date    BLADDER SUSPENSION      CHOLECYSTECTOMY      FEMORAL HERNIA REPAIR Right     bowel dusky but OK - no resection    TOTAL VAGINAL HYSTERECTOMY  1997     Family History   Problem Relation Age of Onset    Colon cancer Biological Mother     Heart disease Biological Father     Atrial fibrillation Biological Brother     Cancer Biological Brother      Social History     Socioeconomic History    Marital status:      Spouse name: None    Number of children: None    Years of education: None    Highest education level: None   Tobacco Use    Smoking status: Never    Smokeless tobacco: Never   Vaping Use    Vaping Use: Never used   Substance and Sexual Activity    Alcohol use: No    Drug use: No    Sexual activity: Never     No Known Allergies    Outpatient Encounter Medications as of 8/20/2024:     acetaminophen (TYLENOL) 500 mg tablet, Take 500 mg by mouth every 6 (six) hours as needed for mild pain (Takes PRN only).    albuterol HFA 90 mcg/actuation inhaler, Inhale 2 puffs every 4 (four) hours as needed for wheezing.    atorvastatin (LIPITOR) 20 mg tablet, Take 1 tablet (20 mg total) by mouth daily. In the evening    denosumab (PROLIA) 60 mg/mL syringe, Inject 60 mg under the skin once. Injection 2 x yearly    docusate sodium (COLACE) 100 mg capsule, Take 100 mg by mouth daily.    estradioL (ESTRACE) 0.01 % (0.1 mg/gram) vaginal cream, Insert 2 g into the vagina 2 (two) times a week (Mon, Thu).    fluticasone propionate (FLONASE) 50 mcg/actuation nasal spray, Administer 1 spray into each nostril daily.    levothyroxine (SYNTHROID) 88 mcg tablet, TAKE 1 TABLET BY MOUTH EVERY DAY    esomeprazole (NexIUM) 40 mg capsule, Take 1 capsule (40 mg total) by mouth daily.    Review of Systems  All other systems reviewed and are negative.    Objective:  Vitals:    08/20/24 1434   BP: 125/80   Pulse: 60   Resp: 18    SpO2: 97%     BP Readings from Last 3 Encounters:   08/20/24 125/80   03/07/24 124/80   10/05/23 128/84       Wt Readings from Last 3 Encounters:   08/20/24 65.6 kg (144 lb 9.6 oz)   03/07/24 62.1 kg (137 lb)   10/05/23 63 kg (139 lb)     Body mass index is 24.06 kg/m².    Physical Exam  Vitals reviewed.   Constitutional:       General: She is not in acute distress.     Appearance: She is well-developed. She is not diaphoretic.   HENT:      Head: Normocephalic and atraumatic.   Eyes:      General: No scleral icterus.        Right eye: No discharge.         Left eye: No discharge.      Conjunctiva/sclera: Conjunctivae normal.   Neck:      Thyroid: No thyromegaly.   Cardiovascular:      Rate and Rhythm: Normal rate and regular rhythm.      Heart sounds: Normal heart sounds. No murmur heard.     No friction rub. No gallop.   Pulmonary:      Effort: Pulmonary effort is normal. No respiratory distress.      Breath sounds: Normal breath sounds. No wheezing or rales.   Abdominal:      General: Bowel sounds are normal. There is no distension.      Palpations: Abdomen is soft.      Tenderness: There is no abdominal tenderness. There is no guarding.      Hernia: No hernia is present.   Musculoskeletal:         General: Tenderness present. Swelling: L ASIS.     Cervical back: Neck supple.      Right lower leg: No edema.      Left lower leg: No edema.   Lymphadenopathy:      Cervical: No cervical adenopathy.   Skin:     General: Skin is warm and dry.   Neurological:      Mental Status: She is alert and oriented to person, place, and time.   Psychiatric:         Mood and Affect: Mood normal.         Assessment/Plan: Katheryn Goncalves is a 78 y.o. female presenting for   Chief Complaint   Patient presents with    Hip Pain   .    Problem List Items Addressed This Visit       Acute pain in female pelvis - Primary    Overview     Likely left inguinal ligament strain. Will obtain xray to rule out fracture. And will order PT for pelvic  alignment exercises.  Patient to follow up with Dr. Olivas in September.         Relevant Orders    X-RAY HIP WITH OR WITHOUT PELVIS 4+ VW LEFT    Ambulatory referral to Physical Therapy       Orders Placed This Encounter   Procedures    X-RAY HIP WITH OR WITHOUT PELVIS 4+ VW LEFT     Standing Status:   Future     Standing Expiration Date:   8/20/2025     Order Specific Question:   Release to patient     Answer:   Immediate [1]    Ambulatory referral to Physical Therapy     Standing Status:   Future     Standing Expiration Date:   8/20/2025     Referral Priority:   Routine     Referral Type:   Physical Therapy     Referral Reason:   Specialty Services Required     Number of Visits Requested:   10       There are no Patient Instructions on file for this visit.    Follow up if symptoms worsen or fail to improve.         Medication List            Accurate as of August 20, 2024  3:06 PM. If you have any questions, ask your nurse or doctor.                CONTINUE taking these medications      acetaminophen 500 mg tablet  Commonly known as: TYLENOL  Take 500 mg by mouth every 6 (six) hours as needed for mild pain (Takes PRN only).  Dose: 500 mg     albuterol HFA 90 mcg/actuation inhaler  Inhale 2 puffs every 4 (four) hours as needed for wheezing.  Dose: 2 puff     atorvastatin 20 mg tablet  Commonly known as: LIPITOR  Take 1 tablet (20 mg total) by mouth daily. In the evening  Dose: 20 mg     denosumab 60 mg/mL syringe  Commonly known as: PROLIA  Inject 60 mg under the skin once. Injection 2 x yearly  Dose: 60 mg     docusate sodium 100 mg capsule  Commonly known as: COLACE  Take 100 mg by mouth daily.  Dose: 100 mg     esomeprazole 40 mg capsule  Commonly known as: NexIUM  Take 1 capsule (40 mg total) by mouth daily.  Dose: 40 mg     estradioL 0.01 % (0.1 mg/gram) vaginal cream  Commonly known as: ESTRACE  Insert 2 g into the vagina 2 (two) times a week (Mon, Thu).  Dose: 2 g     fluticasone propionate 50 mcg/actuation  nasal spray  Commonly known as: FLONASE  Administer 1 spray into each nostril daily.  Dose: 1 spray     levothyroxine 88 mcg tablet  Commonly known as: SYNTHROID  TAKE 1 TABLET BY MOUTH EVERY DAY  Dose: 88 mcg

## 2025-02-28 ENCOUNTER — TRANSCRIBE ORDERS (OUTPATIENT)
Dept: OPHTHALMOLOGY | Facility: HOSPITAL | Age: 80
End: 2025-02-28

## 2025-02-28 DIAGNOSIS — H40.053 BILATERAL OCULAR HYPERTENSION: Primary | ICD-10-CM

## 2025-03-17 NOTE — TELEPHONE ENCOUNTER
Medicine Refill Request    Last Office: 9/28/2022   Last Consult Visit: 5/30/2019  Last Telemedicine Visit: 12/14/2022 Jovanna Hardy CRNP    Next Appointment: 10/5/2023      Current Outpatient Medications:   •  acetaminophen (TYLENOL) 500 mg tablet, Take 500 mg by mouth every 6 (six) hours as needed for mild pain (Takes PRN only)., Disp: , Rfl:   •  atorvastatin (LIPITOR) 20 mg tablet, TAKE 1 TABLET BY MOUTH EVERY DAY (Patient taking differently: Take 20 mg by mouth every evening.), Disp: 90 tablet, Rfl: 3  •  denosumab (PROLIA) 60 mg/mL syringe, Inject 60 mg under the skin once. Injection 2 x yearly, Disp: , Rfl:   •  esomeprazole (NexIUM) 40 mg capsule, Take 1 capsule (40 mg total) by mouth daily., Disp: 90 capsule, Rfl: 0  •  estradioL (ESTRACE) 0.01 % (0.1 mg/gram) vaginal cream, Insert 2 g into the vagina 2 (two) times a week (Mon, Thu)., Disp: , Rfl:   •  levothyroxine 88 mcg tablet, TAKE 1 TABLET BY MOUTH EVERY DAY (Patient taking differently: Take 88 mcg by mouth daily.), Disp: 90 tablet, Rfl: 3  •  oxybutynin (DITROPAN) 5 mg tablet, Take 1 tablet (5 mg total) by mouth nightly as needed (urinary frrequency)., Disp: 30 tablet, Rfl: 1  •  traMADoL (ULTRAM) 50 mg tablet, Take 1 tablet (50 mg total) by mouth every 6 (six) hours as needed for moderate pain or severe pain (For post-op pain. Do not drive while taking)., Disp: 10 tablet, Rfl: 0      BP Readings from Last 3 Encounters:   09/28/22 114/60   09/28/22 114/60   08/10/22 131/64       Recent Lab results:  Lab Results   Component Value Date    CHOL 159 11/03/2022   ,   Lab Results   Component Value Date    HDL 66 11/03/2022   ,   Lab Results   Component Value Date    LDLCALC 83 11/03/2022   ,   Lab Results   Component Value Date    TRIG 50 11/03/2022        Lab Results   Component Value Date    GLUCOSE 78 10/11/2022   , No results found for: HGBA1C      Lab Results   Component Value Date    CREATININE 0.7 10/11/2022       Lab Results   Component Value  Physical Therapy Visit    Visit Type: Daily Treatment Note  Visit: 2  Referring Provider: Yasemin Cordova MD  Medical Diagnosis (from order): M25.562 - Left knee pain, unspecified chronicity     SUBJECTIVE                                                                                                               Patient reports that she was able to play volleyball this past weekend. She did fall on her knee once and it hurt. Reports a pain further up into the front of the quadriceps/thigh.     Pain / Symptoms  - Pain rating (out of 10): Current: 0       OBJECTIVE                                                                                                                           Special Tests  Knee: Patella  - Patellar Apprehension Test:  Left: negative                 Treatment     Therapeutic Exercise  Hamstring stretch in supine, right/left 30 seconds  Quadriceps stretch in supine, right/left 30 seconds    Seated long arc quadriceps, 1 x 10 right/left      Manual resistance  Seated long arc quadriceps (red), 1 x 10 right/left   Seated hamstring pull backs (red), 1 x 10 right/left   Double leg squat, 1 x 10   Single leg squat to table top, 1 x 10 right/left     Supine straight leg raises, 1 x 10 right/left      With foot in external rotation  Bridges, 1 x 10   Marching bridges, 1 x 10   Prone hip extension, 2 x 10 right/left   Side-lying hip abduction, 1 x 10 right/left   Side-lying clamshells, 1 x 10 right/left   Side-lying reverse clamshells, 1 x 10 right/left      Manual resistance    Skilled input: verbal instruction/cues    Writer verbally educated and received verbal consent for hand placement, positioning of patient, and techniques to be performed today from patient   Home Exercise Program  Access Code: [email or web address]  URL: [email or web address]  Date: 03/17/2025  Prepared by: Priscila Dumont    Exercises  - Supine Hamstring Stretch with Strap  - 2 x daily - 7 x weekly - 2 sets - 30  Date    TSH 1.19 10/11/2022            hold  - Supine Quad Set  - 2 x daily - 7 x weekly - 2 sets - 10 reps - 2 hold  - Active Straight Leg Raise with Quad Set  - 2 x daily - 7 x weekly - 2 sets - 10 reps - 2 hold  - Sidelying Hip Abduction  - 2 x daily - 7 x weekly - 2 sets - 10 reps - 2 hold  - Clam  - 2 x daily - 7 x weekly - 2 sets - 10 reps - 2 hold  - Standing Terminal Knee Extension at Wall with Ball  - 2 x daily - 7 x weekly - 2 sets - 10 reps - 2 hold  - Supine Bridge with Resistance Band  - 2 x daily - 7 x weekly - 2 sets - 10 reps - 2 hold  - Sitting Knee Extension with Resistance  - 2 x daily - 7 x weekly - 2 sets - 10 reps - 2 hold  - Single Leg Squat with Chair Touch  - 2 x daily - 7 x weekly - 2 sets - 10 reps - 2 hold      ASSESSMENT                                                                                                            Patient completed all activities included during today's physical therapy session with no increase in knee pain or symptoms. Patient with full mobility of the left knee and strong check reign on MPFL with patellar quadrant testing. Thigh and hip pain likely related to supine hip flexor lifts from home program. This will improve as strength improves.     Patient demonstrated good tolerance for progression of strengthening exercises. Patient demonstrated improvement in awareness of squat biomechanics, but required cuing to improve glute dominant movement pattern. Patient's home exercises were reviewed, modified, and progressed during today's physical therapy session.    Education:   - Results of above outlined education: Verbalizes understanding    PLAN                                                                                                                           Suggestions for next session as indicated: Progress per plan of care       Therapy procedure time and total treatment time can be found documented on the Time Entry flowsheet

## 2025-05-13 ENCOUNTER — TRANSCRIBE ORDERS (OUTPATIENT)
Dept: SCHEDULING | Age: 80
End: 2025-05-13

## 2025-05-13 DIAGNOSIS — M81.0 SENILE OSTEOPOROSIS: Primary | ICD-10-CM

## 2025-05-27 ENCOUNTER — HOSPITAL ENCOUNTER (OUTPATIENT)
Dept: RADIOLOGY | Facility: CLINIC | Age: 80
Discharge: HOME | End: 2025-05-27
Attending: INTERNAL MEDICINE
Payer: MEDICARE

## 2025-05-27 DIAGNOSIS — M81.0 SENILE OSTEOPOROSIS: ICD-10-CM

## 2025-05-27 PROCEDURE — 77080 DXA BONE DENSITY AXIAL: CPT

## (undated) DEVICE — CORD LAPAROSCOPIC DISP STERILE

## (undated) DEVICE — SOLN IRRIG .9%SOD 1000ML

## (undated) DEVICE — SPONGE GAUZE 4X4 4 PLY-2S

## (undated) DEVICE — APPLICATOR CHLORAPREP 26ML ORANGE TINT

## (undated) DEVICE — TUBING SMOKE EVAC PENCIL COATED

## (undated) DEVICE — GLOVE SURG PROTEXIS PF 8

## (undated) DEVICE — TIPS SCISSOR MICROLINE LAP

## (undated) DEVICE — SUTURE MONOCRYL 4-0 Y426H PS-2 27IN

## (undated) DEVICE — COVER LIGHTHANDLE (STERILE SINGLE PA

## (undated) DEVICE — ***USE 57698*** SLEEVE FLOWTRON DVT CALF SINGLE USE

## (undated) DEVICE — PAD GROUND ELECTROSURGICAL W/CORD

## (undated) DEVICE — SYRINGE 10CC CONTROL LL

## (undated) DEVICE — BALLOON PREPERITONEAL DISTENTION AUTOSUTURE SMBTTOVL

## (undated) DEVICE — TIP BOVIE BLADE COATED INSULATED 2.75IN

## (undated) DEVICE — PACK RFID GENERAL LAPAROSCOPY

## (undated) DEVICE — MANIFOLD SINGLE PORT NEPTUNE

## (undated) DEVICE — EVACUATOR PLUME-AWAY LAP SMOKE PKG

## (undated) DEVICE — SPONGE LAP E-TAPE 4 X 18

## (undated) DEVICE — ***USE 56941*** SUTURE VICRYL 0 J603H UR-6

## (undated) DEVICE — GLOVE 7 1/2 PROTEXIS PI MICRO

## (undated) DEVICE — TUBING INSUFFLATION PNEUMOSURE HEATED HIGH FLOW

## (undated) DEVICE — NEEDLE DISP HYPO 25GX1-1/2IN

## (undated) DEVICE — ADHESIVE SKIN DERMABOND ADVANCED 0.7ML

## (undated) DEVICE — GLOVE SZ 7.5 LINER PROTEXIS PI BL

## (undated) DEVICE — COVER LIGHTHANDLE

## (undated) DEVICE — SPACEMAKER PRO 10MM - 12MM

## (undated) DEVICE — GLOVE SZ 8.5 LINER PROTEXIS PI BL

## (undated) DEVICE — NEEDLE INSUFFLATION 120MM

## (undated) DEVICE — ***USE 59070*** SUTURE VICRYL 0 J260H CT-1 27IN UNDYED

## (undated) DEVICE — GAUZE 8X4 16 PLY RFID DOUBLE XRAY

## (undated) DEVICE — SUTURE VICRYL ENDOLOOP   0   EJ10G

## (undated) DEVICE — SANI-SERVE SLUSH DRAPE SUBSTIT

## (undated) DEVICE — DRAPE IOBAN

## (undated) DEVICE — DRESSING TEGADERM 4X4 3/4

## (undated) DEVICE — SOLN IRRIG STER WATER 1000ML

## (undated) DEVICE — Device

## (undated) DEVICE — PACK MINOR BMH

## (undated) DEVICE — STRYKEFLOW 2 W/ DISP TIP